# Patient Record
Sex: MALE | Race: WHITE | NOT HISPANIC OR LATINO | Employment: OTHER | URBAN - METROPOLITAN AREA
[De-identification: names, ages, dates, MRNs, and addresses within clinical notes are randomized per-mention and may not be internally consistent; named-entity substitution may affect disease eponyms.]

---

## 2021-04-27 ENCOUNTER — HOSPITAL ENCOUNTER (INPATIENT)
Facility: MEDICAL CENTER | Age: 71
LOS: 14 days | DRG: 824 | End: 2021-05-12
Attending: EMERGENCY MEDICINE | Admitting: INTERNAL MEDICINE
Payer: MEDICARE

## 2021-04-27 ENCOUNTER — HOSPITAL ENCOUNTER (OUTPATIENT)
Dept: RADIOLOGY | Facility: MEDICAL CENTER | Age: 71
End: 2021-04-27
Payer: MEDICARE

## 2021-04-27 DIAGNOSIS — R19.00 RETROPERITONEAL MASS: ICD-10-CM

## 2021-04-27 DIAGNOSIS — C83.38 DIFFUSE LARGE B-CELL LYMPHOMA OF LYMPH NODES OF MULTIPLE REGIONS (HCC): Primary | ICD-10-CM

## 2021-04-27 LAB
ALBUMIN SERPL BCP-MCNC: 3.5 G/DL (ref 3.2–4.9)
ALBUMIN/GLOB SERPL: 1.3 G/DL
ALP SERPL-CCNC: 76 U/L (ref 30–99)
ALT SERPL-CCNC: 9 U/L (ref 2–50)
ANION GAP SERPL CALC-SCNC: 10 MMOL/L (ref 7–16)
AST SERPL-CCNC: 16 U/L (ref 12–45)
BASOPHILS # BLD AUTO: 0.9 % (ref 0–1.8)
BASOPHILS # BLD: 0.06 K/UL (ref 0–0.12)
BILIRUB SERPL-MCNC: 0.3 MG/DL (ref 0.1–1.5)
BUN SERPL-MCNC: 27 MG/DL (ref 8–22)
CALCIUM SERPL-MCNC: 13.8 MG/DL (ref 8.5–10.5)
CHLORIDE SERPL-SCNC: 98 MMOL/L (ref 96–112)
CO2 SERPL-SCNC: 26 MMOL/L (ref 20–33)
CREAT SERPL-MCNC: 2.38 MG/DL (ref 0.5–1.4)
EOSINOPHIL # BLD AUTO: 0.1 K/UL (ref 0–0.51)
EOSINOPHIL NFR BLD: 1.5 % (ref 0–6.9)
ERYTHROCYTE [DISTWIDTH] IN BLOOD BY AUTOMATED COUNT: 42.5 FL (ref 35.9–50)
GLOBULIN SER CALC-MCNC: 2.8 G/DL (ref 1.9–3.5)
GLUCOSE SERPL-MCNC: 81 MG/DL (ref 65–99)
HCT VFR BLD AUTO: 30.6 % (ref 42–52)
HGB BLD-MCNC: 9.8 G/DL (ref 14–18)
IMM GRANULOCYTES # BLD AUTO: 0.02 K/UL (ref 0–0.11)
IMM GRANULOCYTES NFR BLD AUTO: 0.3 % (ref 0–0.9)
LIPASE SERPL-CCNC: 26 U/L (ref 11–82)
LYMPHOCYTES # BLD AUTO: 0.43 K/UL (ref 1–4.8)
LYMPHOCYTES NFR BLD: 6.5 % (ref 22–41)
MAGNESIUM SERPL-MCNC: 2.1 MG/DL (ref 1.5–2.5)
MCH RBC QN AUTO: 25.8 PG (ref 27–33)
MCHC RBC AUTO-ENTMCNC: 32 G/DL (ref 33.7–35.3)
MCV RBC AUTO: 80.5 FL (ref 81.4–97.8)
MONOCYTES # BLD AUTO: 0.84 K/UL (ref 0–0.85)
MONOCYTES NFR BLD AUTO: 12.8 % (ref 0–13.4)
NEUTROPHILS # BLD AUTO: 5.13 K/UL (ref 1.82–7.42)
NEUTROPHILS NFR BLD: 78 % (ref 44–72)
NRBC # BLD AUTO: 0 K/UL
NRBC BLD-RTO: 0 /100 WBC
PHOSPHATE SERPL-MCNC: 4.2 MG/DL (ref 2.5–4.5)
PLATELET # BLD AUTO: 301 K/UL (ref 164–446)
PMV BLD AUTO: 10 FL (ref 9–12.9)
POTASSIUM SERPL-SCNC: 4.1 MMOL/L (ref 3.6–5.5)
PROT SERPL-MCNC: 6.3 G/DL (ref 6–8.2)
RBC # BLD AUTO: 3.8 M/UL (ref 4.7–6.1)
SODIUM SERPL-SCNC: 134 MMOL/L (ref 135–145)
WBC # BLD AUTO: 6.6 K/UL (ref 4.8–10.8)

## 2021-04-27 PROCEDURE — U0003 INFECTIOUS AGENT DETECTION BY NUCLEIC ACID (DNA OR RNA); SEVERE ACUTE RESPIRATORY SYNDROME CORONAVIRUS 2 (SARS-COV-2) (CORONAVIRUS DISEASE [COVID-19]), AMPLIFIED PROBE TECHNIQUE, MAKING USE OF HIGH THROUGHPUT TECHNOLOGIES AS DESCRIBED BY CMS-2020-01-R: HCPCS

## 2021-04-27 PROCEDURE — G0378 HOSPITAL OBSERVATION PER HR: HCPCS

## 2021-04-27 PROCEDURE — 80053 COMPREHEN METABOLIC PANEL: CPT

## 2021-04-27 PROCEDURE — 83735 ASSAY OF MAGNESIUM: CPT

## 2021-04-27 PROCEDURE — 83690 ASSAY OF LIPASE: CPT

## 2021-04-27 PROCEDURE — 99285 EMERGENCY DEPT VISIT HI MDM: CPT

## 2021-04-27 PROCEDURE — 84100 ASSAY OF PHOSPHORUS: CPT

## 2021-04-27 PROCEDURE — U0005 INFEC AGEN DETEC AMPLI PROBE: HCPCS

## 2021-04-27 PROCEDURE — 99220 PR INITIAL OBSERVATION CARE,LEVL III: CPT | Performed by: STUDENT IN AN ORGANIZED HEALTH CARE EDUCATION/TRAINING PROGRAM

## 2021-04-27 PROCEDURE — 85025 COMPLETE CBC W/AUTO DIFF WBC: CPT

## 2021-04-27 RX ORDER — AMLODIPINE BESYLATE 5 MG/1
5 TABLET ORAL
Status: DISCONTINUED | OUTPATIENT
Start: 2021-04-27 | End: 2021-04-27

## 2021-04-27 RX ORDER — SODIUM CHLORIDE 9 MG/ML
INJECTION, SOLUTION INTRAVENOUS CONTINUOUS
Status: DISCONTINUED | OUTPATIENT
Start: 2021-04-27 | End: 2021-04-30

## 2021-04-27 RX ORDER — ALLOPURINOL 100 MG/1
100 TABLET ORAL 2 TIMES DAILY
COMMUNITY

## 2021-04-27 RX ORDER — LABETALOL HYDROCHLORIDE 5 MG/ML
10 INJECTION, SOLUTION INTRAVENOUS ONCE
Status: DISCONTINUED | OUTPATIENT
Start: 2021-04-27 | End: 2021-04-27

## 2021-04-27 RX ORDER — ACETAMINOPHEN 325 MG/1
650 TABLET ORAL EVERY 4 HOURS PRN
Status: DISCONTINUED | OUTPATIENT
Start: 2021-04-27 | End: 2021-05-12 | Stop reason: HOSPADM

## 2021-04-27 RX ORDER — LABETALOL HYDROCHLORIDE 5 MG/ML
5 INJECTION, SOLUTION INTRAVENOUS ONCE
Status: COMPLETED | OUTPATIENT
Start: 2021-04-27 | End: 2021-04-28

## 2021-04-28 ENCOUNTER — ANESTHESIA EVENT (OUTPATIENT)
Dept: SURGERY | Facility: MEDICAL CENTER | Age: 71
DRG: 824 | End: 2021-04-28
Payer: MEDICARE

## 2021-04-28 ENCOUNTER — ANESTHESIA (OUTPATIENT)
Dept: SURGERY | Facility: MEDICAL CENTER | Age: 71
DRG: 824 | End: 2021-04-28
Payer: MEDICARE

## 2021-04-28 ENCOUNTER — APPOINTMENT (OUTPATIENT)
Dept: RADIOLOGY | Facility: MEDICAL CENTER | Age: 71
DRG: 824 | End: 2021-04-28
Attending: UROLOGY
Payer: MEDICARE

## 2021-04-28 ENCOUNTER — APPOINTMENT (OUTPATIENT)
Dept: RADIOLOGY | Facility: MEDICAL CENTER | Age: 71
DRG: 824 | End: 2021-04-28
Attending: INTERNAL MEDICINE
Payer: MEDICARE

## 2021-04-28 PROBLEM — I10 HYPERTENSION: Status: ACTIVE | Noted: 2021-04-28

## 2021-04-28 PROBLEM — R59.1 LYMPHADENOPATHY: Status: ACTIVE | Noted: 2021-04-28

## 2021-04-28 PROBLEM — E83.52 HYPERCALCEMIA: Status: ACTIVE | Noted: 2021-04-28

## 2021-04-28 PROBLEM — J90 PLEURAL EFFUSION ON LEFT: Status: ACTIVE | Noted: 2021-04-28

## 2021-04-28 PROBLEM — N13.30 HYDRONEPHROSIS: Status: ACTIVE | Noted: 2021-04-28

## 2021-04-28 PROBLEM — N17.9 ACUTE KIDNEY INJURY SUPERIMPOSED ON CKD (HCC): Status: ACTIVE | Noted: 2021-04-28

## 2021-04-28 PROBLEM — N18.9 ACUTE KIDNEY INJURY SUPERIMPOSED ON CKD (HCC): Status: ACTIVE | Noted: 2021-04-28

## 2021-04-28 LAB
CALCIUM SERPL-MCNC: 13.3 MG/DL (ref 8.5–10.5)
EKG IMPRESSION: NORMAL
PHOSPHATE SERPL-MCNC: 4.3 MG/DL (ref 2.5–4.5)
PTH-INTACT SERPL-MCNC: 4.1 PG/ML (ref 14–72)
SARS-COV-2 RNA RESP QL NAA+PROBE: NOTDETECTED
SPECIMEN SOURCE: NORMAL

## 2021-04-28 PROCEDURE — 770006 HCHG ROOM/CARE - MED/SURG/GYN SEMI*

## 2021-04-28 PROCEDURE — 160048 HCHG OR STATISTICAL LEVEL 1-5: Performed by: UROLOGY

## 2021-04-28 PROCEDURE — 36415 COLL VENOUS BLD VENIPUNCTURE: CPT

## 2021-04-28 PROCEDURE — 160035 HCHG PACU - 1ST 60 MINS PHASE I: Performed by: UROLOGY

## 2021-04-28 PROCEDURE — 93010 ELECTROCARDIOGRAM REPORT: CPT | Performed by: INTERNAL MEDICINE

## 2021-04-28 PROCEDURE — 93005 ELECTROCARDIOGRAM TRACING: CPT | Performed by: UROLOGY

## 2021-04-28 PROCEDURE — 83970 ASSAY OF PARATHORMONE: CPT

## 2021-04-28 PROCEDURE — 99232 SBSQ HOSP IP/OBS MODERATE 35: CPT | Performed by: INTERNAL MEDICINE

## 2021-04-28 PROCEDURE — C2617 STENT, NON-COR, TEM W/O DEL: HCPCS | Performed by: UROLOGY

## 2021-04-28 PROCEDURE — A9270 NON-COVERED ITEM OR SERVICE: HCPCS | Performed by: STUDENT IN AN ORGANIZED HEALTH CARE EDUCATION/TRAINING PROGRAM

## 2021-04-28 PROCEDURE — 700101 HCHG RX REV CODE 250: Performed by: ANESTHESIOLOGY

## 2021-04-28 PROCEDURE — 160039 HCHG SURGERY MINUTES - EA ADDL 1 MIN LEVEL 3: Performed by: UROLOGY

## 2021-04-28 PROCEDURE — 700111 HCHG RX REV CODE 636 W/ 250 OVERRIDE (IP): Performed by: STUDENT IN AN ORGANIZED HEALTH CARE EDUCATION/TRAINING PROGRAM

## 2021-04-28 PROCEDURE — 96374 THER/PROPH/DIAG INJ IV PUSH: CPT

## 2021-04-28 PROCEDURE — 700117 HCHG RX CONTRAST REV CODE 255: Performed by: UROLOGY

## 2021-04-28 PROCEDURE — C1758 CATHETER, URETERAL: HCPCS | Performed by: UROLOGY

## 2021-04-28 PROCEDURE — 700105 HCHG RX REV CODE 258: Performed by: STUDENT IN AN ORGANIZED HEALTH CARE EDUCATION/TRAINING PROGRAM

## 2021-04-28 PROCEDURE — 700102 HCHG RX REV CODE 250 W/ 637 OVERRIDE(OP): Performed by: STUDENT IN AN ORGANIZED HEALTH CARE EDUCATION/TRAINING PROGRAM

## 2021-04-28 PROCEDURE — 84100 ASSAY OF PHOSPHORUS: CPT

## 2021-04-28 PROCEDURE — 160009 HCHG ANES TIME/MIN: Performed by: UROLOGY

## 2021-04-28 PROCEDURE — 74176 CT ABD & PELVIS W/O CONTRAST: CPT | Mod: MG

## 2021-04-28 PROCEDURE — 160002 HCHG RECOVERY MINUTES (STAT): Performed by: UROLOGY

## 2021-04-28 PROCEDURE — 500879 HCHG PACK, CYSTO: Performed by: UROLOGY

## 2021-04-28 PROCEDURE — 82306 VITAMIN D 25 HYDROXY: CPT

## 2021-04-28 PROCEDURE — 160036 HCHG PACU - EA ADDL 30 MINS PHASE I: Performed by: UROLOGY

## 2021-04-28 PROCEDURE — 700111 HCHG RX REV CODE 636 W/ 250 OVERRIDE (IP): Performed by: ANESTHESIOLOGY

## 2021-04-28 PROCEDURE — 82310 ASSAY OF CALCIUM: CPT

## 2021-04-28 PROCEDURE — 700105 HCHG RX REV CODE 258: Performed by: ANESTHESIOLOGY

## 2021-04-28 PROCEDURE — C1769 GUIDE WIRE: HCPCS | Performed by: UROLOGY

## 2021-04-28 PROCEDURE — 700101 HCHG RX REV CODE 250: Performed by: STUDENT IN AN ORGANIZED HEALTH CARE EDUCATION/TRAINING PROGRAM

## 2021-04-28 PROCEDURE — 96372 THER/PROPH/DIAG INJ SC/IM: CPT

## 2021-04-28 PROCEDURE — 501329 HCHG SET, CYSTO IRRIG Y TUR: Performed by: UROLOGY

## 2021-04-28 PROCEDURE — 160028 HCHG SURGERY MINUTES - 1ST 30 MINS LEVEL 3: Performed by: UROLOGY

## 2021-04-28 DEVICE — STENT UROLOGICAL POLARIS 6X26  ULTRA: Type: IMPLANTABLE DEVICE | Site: URETER | Status: FUNCTIONAL

## 2021-04-28 RX ORDER — OXYCODONE HCL 5 MG/5 ML
5 SOLUTION, ORAL ORAL
Status: DISCONTINUED | OUTPATIENT
Start: 2021-04-28 | End: 2021-04-28 | Stop reason: HOSPADM

## 2021-04-28 RX ORDER — ATENOLOL 50 MG/1
50 TABLET ORAL
Status: DISCONTINUED | OUTPATIENT
Start: 2021-04-28 | End: 2021-05-12 | Stop reason: HOSPADM

## 2021-04-28 RX ORDER — LIDOCAINE HYDROCHLORIDE 20 MG/ML
INJECTION, SOLUTION EPIDURAL; INFILTRATION; INTRACAUDAL; PERINEURAL PRN
Status: DISCONTINUED | OUTPATIENT
Start: 2021-04-28 | End: 2021-04-28 | Stop reason: SURG

## 2021-04-28 RX ORDER — CYCLOBENZAPRINE HCL 10 MG
10 TABLET ORAL 3 TIMES DAILY PRN
COMMUNITY

## 2021-04-28 RX ORDER — ATENOLOL 50 MG/1
50 TABLET ORAL DAILY
COMMUNITY

## 2021-04-28 RX ORDER — OXYCODONE HCL 5 MG/5 ML
10 SOLUTION, ORAL ORAL
Status: DISCONTINUED | OUTPATIENT
Start: 2021-04-28 | End: 2021-04-28 | Stop reason: HOSPADM

## 2021-04-28 RX ORDER — SODIUM CHLORIDE, SODIUM LACTATE, POTASSIUM CHLORIDE, CALCIUM CHLORIDE 600; 310; 30; 20 MG/100ML; MG/100ML; MG/100ML; MG/100ML
INJECTION, SOLUTION INTRAVENOUS
Status: DISCONTINUED | OUTPATIENT
Start: 2021-04-28 | End: 2021-04-28 | Stop reason: SURG

## 2021-04-28 RX ORDER — MIDAZOLAM HYDROCHLORIDE 1 MG/ML
INJECTION INTRAMUSCULAR; INTRAVENOUS PRN
Status: DISCONTINUED | OUTPATIENT
Start: 2021-04-28 | End: 2021-04-28 | Stop reason: SURG

## 2021-04-28 RX ORDER — CALCITONIN SALMON 200 [USP'U]/ML
4 INJECTION, SOLUTION INTRAMUSCULAR; SUBCUTANEOUS ONCE
Status: DISCONTINUED | OUTPATIENT
Start: 2021-04-28 | End: 2021-04-28

## 2021-04-28 RX ORDER — ONDANSETRON 2 MG/ML
4 INJECTION INTRAMUSCULAR; INTRAVENOUS
Status: DISCONTINUED | OUTPATIENT
Start: 2021-04-28 | End: 2021-04-28 | Stop reason: HOSPADM

## 2021-04-28 RX ORDER — HALOPERIDOL 5 MG/ML
1 INJECTION INTRAMUSCULAR
Status: DISCONTINUED | OUTPATIENT
Start: 2021-04-28 | End: 2021-04-28 | Stop reason: HOSPADM

## 2021-04-28 RX ORDER — ATENOLOL 50 MG/1
50 TABLET ORAL
Status: DISCONTINUED | OUTPATIENT
Start: 2021-04-28 | End: 2021-04-28

## 2021-04-28 RX ORDER — HYDROMORPHONE HYDROCHLORIDE 1 MG/ML
0.5 INJECTION, SOLUTION INTRAMUSCULAR; INTRAVENOUS; SUBCUTANEOUS EVERY 4 HOURS PRN
Status: DISCONTINUED | OUTPATIENT
Start: 2021-04-28 | End: 2021-05-01

## 2021-04-28 RX ORDER — ROCURONIUM BROMIDE 10 MG/ML
INJECTION, SOLUTION INTRAVENOUS PRN
Status: DISCONTINUED | OUTPATIENT
Start: 2021-04-28 | End: 2021-04-28 | Stop reason: SURG

## 2021-04-28 RX ORDER — LABETALOL HYDROCHLORIDE 5 MG/ML
5 INJECTION, SOLUTION INTRAVENOUS
Status: DISCONTINUED | OUTPATIENT
Start: 2021-04-28 | End: 2021-04-28 | Stop reason: HOSPADM

## 2021-04-28 RX ORDER — CEFAZOLIN SODIUM 1 G/3ML
INJECTION, POWDER, FOR SOLUTION INTRAMUSCULAR; INTRAVENOUS PRN
Status: DISCONTINUED | OUTPATIENT
Start: 2021-04-28 | End: 2021-04-28 | Stop reason: SURG

## 2021-04-28 RX ORDER — DIPHENHYDRAMINE HYDROCHLORIDE 50 MG/ML
12.5 INJECTION INTRAMUSCULAR; INTRAVENOUS
Status: DISCONTINUED | OUTPATIENT
Start: 2021-04-28 | End: 2021-04-28 | Stop reason: HOSPADM

## 2021-04-28 RX ORDER — SODIUM CHLORIDE, SODIUM LACTATE, POTASSIUM CHLORIDE, CALCIUM CHLORIDE 600; 310; 30; 20 MG/100ML; MG/100ML; MG/100ML; MG/100ML
INJECTION, SOLUTION INTRAVENOUS CONTINUOUS
Status: DISCONTINUED | OUTPATIENT
Start: 2021-04-28 | End: 2021-04-28 | Stop reason: HOSPADM

## 2021-04-28 RX ADMIN — ATENOLOL 50 MG: 50 TABLET ORAL at 09:25

## 2021-04-28 RX ADMIN — CEFAZOLIN 2 G: 330 INJECTION, POWDER, FOR SOLUTION INTRAMUSCULAR; INTRAVENOUS at 16:12

## 2021-04-28 RX ADMIN — ACETAMINOPHEN 650 MG: 325 TABLET, FILM COATED ORAL at 01:57

## 2021-04-28 RX ADMIN — SODIUM CHLORIDE: 9 INJECTION, SOLUTION INTRAVENOUS at 06:44

## 2021-04-28 RX ADMIN — MIDAZOLAM HYDROCHLORIDE 2 MG: 1 INJECTION, SOLUTION INTRAMUSCULAR; INTRAVENOUS at 16:18

## 2021-04-28 RX ADMIN — SUGAMMADEX 200 MG: 100 INJECTION, SOLUTION INTRAVENOUS at 16:42

## 2021-04-28 RX ADMIN — SODIUM CHLORIDE, POTASSIUM CHLORIDE, SODIUM LACTATE AND CALCIUM CHLORIDE: 600; 310; 30; 20 INJECTION, SOLUTION INTRAVENOUS at 16:12

## 2021-04-28 RX ADMIN — LABETALOL HYDROCHLORIDE 5 MG: 5 INJECTION, SOLUTION INTRAVENOUS at 00:13

## 2021-04-28 RX ADMIN — ROCURONIUM BROMIDE 40 MG: 10 INJECTION, SOLUTION INTRAVENOUS at 16:18

## 2021-04-28 RX ADMIN — ENOXAPARIN SODIUM 40 MG: 40 INJECTION SUBCUTANEOUS at 06:44

## 2021-04-28 RX ADMIN — FENTANYL CITRATE 100 MCG: 50 INJECTION, SOLUTION INTRAMUSCULAR; INTRAVENOUS at 16:18

## 2021-04-28 RX ADMIN — LIDOCAINE HYDROCHLORIDE 100 MG: 20 INJECTION, SOLUTION EPIDURAL; INFILTRATION; INTRACAUDAL at 16:18

## 2021-04-28 RX ADMIN — PROPOFOL 150 MG: 10 INJECTION, EMULSION INTRAVENOUS at 16:18

## 2021-04-28 RX ADMIN — SODIUM CHLORIDE: 9 INJECTION, SOLUTION INTRAVENOUS at 00:13

## 2021-04-28 ASSESSMENT — LIFESTYLE VARIABLES
HAVE YOU EVER FELT YOU SHOULD CUT DOWN ON YOUR DRINKING: YES
CONSUMPTION TOTAL: POSITIVE
HOW MANY TIMES IN THE PAST YEAR HAVE YOU HAD 5 OR MORE DRINKS IN A DAY: 12
TOTAL SCORE: 1
DOES PATIENT WANT TO STOP DRINKING: NO
EVER FELT BAD OR GUILTY ABOUT YOUR DRINKING: NO
ALCOHOL_USE: YES
ON A TYPICAL DAY WHEN YOU DRINK ALCOHOL HOW MANY DRINKS DO YOU HAVE: 1
TOTAL SCORE: 1
EVER HAD A DRINK FIRST THING IN THE MORNING TO STEADY YOUR NERVES TO GET RID OF A HANGOVER: NO
AVERAGE NUMBER OF DAYS PER WEEK YOU HAVE A DRINK CONTAINING ALCOHOL: 1
TOTAL SCORE: 1
HAVE PEOPLE ANNOYED YOU BY CRITICIZING YOUR DRINKING: NO

## 2021-04-28 ASSESSMENT — ENCOUNTER SYMPTOMS
BACK PAIN: 1
FEVER: 0
NAUSEA: 0
CONSTIPATION: 1
DEPRESSION: 0
ABDOMINAL PAIN: 0
HEMOPTYSIS: 0
CHILLS: 1
CHILLS: 0
VOMITING: 0
HEARTBURN: 0
SHORTNESS OF BREATH: 0
BRUISES/BLEEDS EASILY: 0
WEIGHT LOSS: 1
FEVER: 1
PALPITATIONS: 0
MYALGIAS: 0
BLURRED VISION: 0
COUGH: 0
DOUBLE VISION: 0
HEADACHES: 0
NECK PAIN: 0
DIZZINESS: 0

## 2021-04-28 ASSESSMENT — PATIENT HEALTH QUESTIONNAIRE - PHQ9
SUM OF ALL RESPONSES TO PHQ9 QUESTIONS 1 AND 2: 3
4. FEELING TIRED OR HAVING LITTLE ENERGY: NEARLY EVERY DAY
SUM OF ALL RESPONSES TO PHQ QUESTIONS 1-9: 16
7. TROUBLE CONCENTRATING ON THINGS, SUCH AS READING THE NEWSPAPER OR WATCHING TELEVISION: NEARLY EVERY DAY
8. MOVING OR SPEAKING SO SLOWLY THAT OTHER PEOPLE COULD HAVE NOTICED. OR THE OPPOSITE, BEING SO FIGETY OR RESTLESS THAT YOU HAVE BEEN MOVING AROUND A LOT MORE THAN USUAL: SEVERAL DAYS
9. THOUGHTS THAT YOU WOULD BE BETTER OFF DEAD, OR OF HURTING YOURSELF: NOT AT ALL
3. TROUBLE FALLING OR STAYING ASLEEP OR SLEEPING TOO MUCH: NOT AT ALL
2. FEELING DOWN, DEPRESSED, IRRITABLE, OR HOPELESS: NEARLY EVERY DAY
6. FEELING BAD ABOUT YOURSELF - OR THAT YOU ARE A FAILURE OR HAVE LET YOURSELF OR YOUR FAMILY DOWN: NEARLY EVERY DAY
5. POOR APPETITE OR OVEREATING: NEARLY EVERY DAY
1. LITTLE INTEREST OR PLEASURE IN DOING THINGS: NOT AT ALL

## 2021-04-28 ASSESSMENT — COGNITIVE AND FUNCTIONAL STATUS - GENERAL
WALKING IN HOSPITAL ROOM: A LITTLE
CLIMB 3 TO 5 STEPS WITH RAILING: A LITTLE
SUGGESTED CMS G CODE MODIFIER DAILY ACTIVITY: CH
MOBILITY SCORE: 22
SUGGESTED CMS G CODE MODIFIER MOBILITY: CJ
DAILY ACTIVITIY SCORE: 24

## 2021-04-28 ASSESSMENT — PAIN DESCRIPTION - PAIN TYPE
TYPE: CHRONIC PAIN

## 2021-04-28 ASSESSMENT — PAIN SCALES - GENERAL: PAIN_LEVEL: 0

## 2021-04-28 NOTE — PROGRESS NOTES
2 RN skin check complete.     Peripheral IV in L forearm. Dry skin to feet. No redness bruising scrapes or scabs noted. Skin assessed under folds.     Pt repositions self frequently and walks occasionally.

## 2021-04-28 NOTE — PROGRESS NOTES
Elvis from Lab called with critical result of Calcium 13.3 at 0602. Critical lab result read back to Elvis.   Critcal improved from previous value, MD already aware.

## 2021-04-28 NOTE — RESPIRATORY CARE
COPD EDUCATION by COPD CLINICAL EDUCATOR  4/28/2021 at 2:57 PM by Sarah Beth Moon, RRT     Patient interviewed by COPD education team. Patient refused COPD program at this time. He states he has been told he has COPD but declined further discussion. [facility].He is not on any medications at this time. He is a regular Marijuana smoker and declines cessation information.

## 2021-04-28 NOTE — CARE PLAN
Problem: Safety  Goal: Will remain free from falls    Outcome: PROGRESSING AS EXPECTED  Note: Universal  fall precautions in place, call light and belongings in reach, reinforced use of call light, treaded socks on, bed in low position and locked.       Problem: Pain Management  Goal: Pain level will decrease to patient's comfort goal    Outcome: PROGRESSING AS EXPECTED  Note: Provided medication per MAR, implemented non-pharm pain control, and educated patient on pain control plan and expectations.

## 2021-04-28 NOTE — ED NOTES
Med Rec partially completed: pharmacy to be called in AM for rec.    Preferred Pharmacy: [facility] [phone]      Allergies:  Not on File    No ORAL antibiotics in last 14 days

## 2021-04-28 NOTE — PROGRESS NOTES
Report received at start of shift.  Assessment complete.  A&O x 4. Patient calls appropriately.  Patient mobilizes independently.  Patient has 0/10 pain.   Denies N&V. Tolerating diet.  + void, + flatus, last BM 4/27.  Patient denies SOB.    Reviewed plan with of care with patient. Call light and personal belongings with in reach. Hourly rounding in place. All needs met at this time.

## 2021-04-28 NOTE — ASSESSMENT & PLAN NOTE
Likely secondary to malignancy will need tissue diagnosis. Had a calcium level of 14 and was given IVF and did recieve IV 4 mg zolendronic acid on 4/27/2021 at 1607.   PTH low, vit D wnl  Trend Ca level  IVF stopped  Resolved

## 2021-04-28 NOTE — CARE PLAN
Problem: Knowledge Deficit  Goal: Knowledge of disease process/condition, treatment plan, diagnostic tests, and medications will improve  Outcome: PROGRESSING AS EXPECTED  Note: Discussed POC with pt, addressed questions and concerns      Problem: Fluid Volume:  Goal: Will maintain balanced intake and output  Outcome: PROGRESSING AS EXPECTED  Note: Pt NPO at this time, IVF running

## 2021-04-28 NOTE — ED PROVIDER NOTES
ED Provider Note    CHIEF COMPLAINT  Chief Complaint   Patient presents with   • Weakness   • Back Pain       HPI  Khalif Kirkpatrick is a 70 y.o. male who presents to the emergency department as a transfer from [facility] for concern for new diagnosis of lymphoma and acute kidney injury.  Patient does have history of chronic kidney disease he states since November he has been having progressively worsening fatigue night sweats weight loss and just generally exhaustion.  He is also developed some back pain and states that in his mind he cannot justify it thinking that he has had a little bit of the symptoms on and off his whole life.  However he followed up with primary care got outpatient laboratory analysis was found an extremely elevated calcium and acute kidney injury on top of his chronic kidney disease.  He states his been urinating okay was treated with pain management the outside facility and states that his pain is improved and manageable at this time.  He understands he was transferred here for rule out cancer    LAbs from OSH    Ct abd pelvis w/o cont  Extensive retroperitoneal lymphadenopathy etiology is likely malignancy secondary to lymphoma or other infiltrative process not excluded.  Left hydronephrosis on the basis of adenopathy diverticula without evidence of diverticulitis bilateral pleural effusions nonobstructing stone right kidney    Renal ultrasound severe left-sided hydro-8 mm focus of increased echogenicity in the right kidney which may resume nonobstructing renal stone    Chest and pelvis x-ray small bilateral pleural effusions left greater than right nonspecific bowel gas pattern      Chemistry with a sodium 133 potassium 4.2 chloride 94 bicarb 30 BUN 33 with creatinine 2.8 which is below his baseline calcium level is 14 otherwise normal LFTs CRP is elevated 3.46 normal TSH normal coags  Urinalysis within normal limits panel mild proteinuria    Patient was given zoledronic acid 1 L of normal  "saline lorazepam and hydrocodone    REVIEW OF SYSTEMS  Positives as above. Pertinent negatives include chest pain shortness of breath dyspnea on exertion bloody stools bloody emesis nausea vomiting cough diarrhea dysuria hematuria flank pain rash  All other review of systems are negative    PAST MEDICAL HISTORY   has a past medical history of Chronic obstructive pulmonary disease (HCC) and Hypertension.    SOCIAL HISTORY  Social History     Tobacco Use   • Smoking status: Never Smoker   Substance and Sexual Activity   • Alcohol use: Not Currently   • Drug use: Yes     Types: Inhaled     Comment: marijuana   • Sexual activity: Not on file       SURGICAL HISTORY  patient denies any surgical history    CURRENT MEDICATIONS  Home Medications    **Home medications have not yet been reviewed for this encounter**         ALLERGIES  Not on File    PHYSICAL EXAM  VITAL SIGNS: BP (!) 199/95   Pulse 77   Temp 36.3 °C (97.3 °F) (Temporal)   Resp 16   Ht 1.753 m (5' 9\")   Wt 88 kg (194 lb)   SpO2 94%   BMI 28.65 kg/m²    Pulse ox interpretation: I interpret this pulse ox as normal.  Constitutional: Alert in no apparent distress.  HENT: Normocephalic atraumatic, MMM  Eyes: PER, Conjunctiva normal, Non-icteric.   Neck: Normal range of motion, No tenderness, Supple, No stridor.   Cardiovascular: Regular rate and rhythm, no murmurs.   Thorax & Lungs: Normal breath sounds, No respiratory distress, No wheezing, No chest tenderness.   Abdomen: Bowel sounds normal, Soft, No tenderness, No pulsatile masses. No peritoneal signs.  Skin: Warm, Dry, No erythema, No rash.   Back: No bony tenderness, No CVA tenderness.   Extremities/MSK: Intact equal distal pulses, No edema, No tenderness, No cyanosis, no major deformities noted  Neurologic: Alert and oriented x3, No focal deficits noted.       DIFFERENTIAL DIAGNOSIS AND WORK UP PLAN    This is a 70 y.o. male who presents with VERO at the outside facility and hypercalcemia and concern for " lymphadenopathy and possible lymphoma on his CT scan leading to left hydronephrosis which is likely the cause of his VERO    DIAGNOSTIC STUDIES / PROCEDURES    EKG  No results found for this or any previous visit.    LABS  Pertinent Lab Findings  CBC with a normal white blood cell count hemoglobin of 9.8 with a left shift, CMP consistent with BUN of 27 creatinine 2.38 baseline is apparently around 1.6 magnesium phosphorus pending lipase normal Covid was sent      RADIOLOGY  No orders to display     The radiologist's interpretation of all radiological studies have been reviewed by me.      COURSE & MEDICAL DECISION MAKING  Pertinent Labs & Imaging studies reviewed. (See chart for details)    9:43 PM  Spoke w Dr Ford who has accepted the patient for hospitalization and will consult hematology in the morning.    I verified that the patient was wearing a mask and I was wearing appropriate PPE every time I entered the room. The patient's mask was on the patient at all times during my encounter except for a brief view of the oropharynx.          FINAL IMPRESSION  1.  Severe lymphadenopathy  2.  Night sweats weight loss  3.  VERO  4.  Left-sided hydronephrosis  5.  Constipation        Electronically signed by: Elizabeth Paredes M.D., 4/27/2021 9:12 PM    This dictation has been created using voice recognition software and/or scribes. The accuracy of the dictation is limited by the abilities of the software and the expertise of the scribes. I expect there may be some errors of grammar and possibly content. I made every attempt to manually correct the errors within my dictation. However, errors related to voice recognition software and/or scribes may still exist and should be interpreted within the appropriate context.

## 2021-04-28 NOTE — H&P
Hospital Medicine History & Physical Note    Date of Service  4/27/2021    Primary Care Physician  No primary care provider on file.    Consultants  None at this time    Code Status  Full Code    Chief Complaint  Chief Complaint   Patient presents with   • Weakness   • Back Pain       History of Presenting Illness  70 y.o. male with past medical history of hypertension on atenolol, CKD who presented 4/27/2021 as a transfer from [facility] for abnormal labs by PMD. Patient sates that he has been having back pain, low grade fevers, night sweats, and weight loss that started in November 2020. He characterizes his low back pain as migratory and dull, 6/10 in intensity, not relieved with anything exacerbated with movement. His PMD is Dr. Singh. He had a calcium level of 14 and was given IVF and IV 4 mg zolendronic acid on 4/27/2021 at 1607. He currently is able to make urine and denies any painful urination or frequency.     Review of Systems  Review of Systems   Constitutional: Positive for chills, fever, malaise/fatigue and weight loss.   HENT: Negative for hearing loss and tinnitus.    Eyes: Negative for blurred vision and double vision.   Respiratory: Negative for cough and hemoptysis.    Cardiovascular: Negative for chest pain and palpitations.   Gastrointestinal: Positive for constipation. Negative for heartburn and nausea.   Genitourinary: Negative for dysuria and urgency.   Musculoskeletal: Positive for back pain. Negative for myalgias and neck pain.   Neurological: Negative for dizziness and headaches.   Endo/Heme/Allergies: Does not bruise/bleed easily.   Psychiatric/Behavioral: Negative for depression and suicidal ideas.       Past Medical History   has a past medical history of Chronic obstructive pulmonary disease (HCC) and Hypertension.    Surgical History  Reviewed and not pertinent.     Family History  Reviewed and not pertinent.     Social History   reports that he has never smoked. He does not have  any smokeless tobacco history on file. He reports previous alcohol use. He reports current drug use. Drug: Inhaled.    Allergies  NKDA    Medications  Prior to Admission Medications   Prescriptions Last Dose Informant Patient Reported? Taking?   ALLOPURINOL PO unknown at unknown  Yes Yes   Sig: Take 1 tablet by mouth every day. Pt was unable to recall dosage/time taken.   Multiple Vitamin (MULTIVITAMIN PO) unknown at unknown  Yes Yes   Sig: Take 1 tablet by mouth every day.   OMEPRAZOLE PO unknown at unknown  Yes Yes   Sig: Take 1 capsule by mouth every day. Pt was unable to recall dosage/time taken.      Facility-Administered Medications: None       Physical Exam  Temp:  [36.3 °C (97.3 °F)] 36.3 °C (97.3 °F)  Pulse:  [77] 77  Resp:  [16] 16  BP: (199)/(95) 199/95  SpO2:  [94 %] 94 %    Physical Exam  Vitals and nursing note reviewed.   Constitutional:       Appearance: Normal appearance.   HENT:      Head: Normocephalic and atraumatic.      Right Ear: Tympanic membrane normal.      Left Ear: Tympanic membrane normal.      Mouth/Throat:      Pharynx: Oropharynx is clear.   Eyes:      Extraocular Movements: Extraocular movements intact.      Pupils: Pupils are equal, round, and reactive to light.   Cardiovascular:      Rate and Rhythm: Normal rate and regular rhythm.   Pulmonary:      Effort: Pulmonary effort is normal. No respiratory distress.      Breath sounds: Normal breath sounds. No stridor. No wheezing or rhonchi.   Abdominal:      General: Abdomen is flat. Bowel sounds are normal. There is no distension.      Palpations: Abdomen is soft. There is no mass.      Tenderness: There is no abdominal tenderness.      Hernia: No hernia is present.   Musculoskeletal:         General: No swelling, tenderness, deformity or signs of injury. Normal range of motion.      Cervical back: Neck supple. No rigidity or tenderness.   Skin:     General: Skin is warm and dry.      Capillary Refill: Capillary refill takes less than  2 seconds.   Neurological:      General: No focal deficit present.      Mental Status: He is alert and oriented to person, place, and time.      Cranial Nerves: No cranial nerve deficit.      Sensory: No sensory deficit.      Motor: No weakness.      Coordination: Coordination normal.   Psychiatric:         Mood and Affect: Mood normal.         Behavior: Behavior normal.         Laboratory:  Recent Labs     04/27/21  2105   WBC 6.6   RBC 3.80*   HEMOGLOBIN 9.8*   HEMATOCRIT 30.6*   MCV 80.5*   MCH 25.8*   MCHC 32.0*   RDW 42.5   PLATELETCT 301   MPV 10.0           Imaging:  OUTSIDE IMAGES-DX CHEST   Final Result      OUTSIDE IMAGES-CT CHEST/ABDOMEN/PELVIS   Final Result        Outside images :   Ct abdomen/pelvis w/o cont  Extensive retroperitoneal lymphadenopathy etiology is likely malignancy secondary to lymphoma or other infiltrative process not excluded    Left hydronephrosis on the basis of adenopathy diverticula without evidence of diverticulitis bilateral pleural effusions nonobstructing stone right kidney     Renal ultrasound severe left-sided hydro-8 mm focus of increased echogenicity in the right kidney which may resume nonobstructing renal stone     Chest and pelvis x-ray small bilateral pleural effusions left greater than right nonspecific bowel gas pattern    I have personally reviewed the above imaging with ER physician, Dr. Paredes.     Assessment/Plan:  I anticipate this patient is appropriate for observation status at this time.    * Lymphadenopathy  Assessment & Plan  Likely lymphoma. Will need biopsy and tissue diagnosis. Hematology/oncology to be consulted in the morning.       Acute kidney injury superimposed on CKD (HCC)  Assessment & Plan  Probably secondary to left sided hydronephrosis causing post obstruction failure. He is able to make urine at this time. Denies any pain. Ua negative.  Monitor Cr  Avoid nephrotoxic agents        Hypercalcemia  Assessment & Plan  Likely secondary to malignancy  will need tissue diagnosis. Had a calcium level of 14 and was given IVF and did recieve IV 4 mg zolendronic acid on 4/27/2021 at 1607.   IV fluids  Check PTH, magnesium, phos, 25 hydroxy vitamin D  Discussed with pharmacy will obtain Ca level with am labs.   If no improvement will consider calcitonin.       Hypertension  Assessment & Plan  Continue with atenolol 50 mg daily     Pleural effusion on left  Assessment & Plan  Unclear suspect secondary to malignancy.     Hydronephrosis  Assessment & Plan  From lymphadenopathy causing obstruction.   Monitor Cr  Urology/IR in morning for possible nephrostomy tube placement     VTE: lovenox

## 2021-04-28 NOTE — ANESTHESIA PROCEDURE NOTES
Airway    Date/Time: 4/28/2021 4:18 PM  Performed by: Rush Downing M.D.  Authorized by: Rush Downing M.D.     Location:  OR  Urgency:  Elective  Indications for Airway Management:  Anesthesia      Spontaneous Ventilation: absent    Sedation Level:  Deep  Preoxygenated: Yes    Patient Position:  Sniffing  Final Airway Type:  Endotracheal airway  Final Endotracheal Airway:  ETT  Cuffed: Yes    Technique Used for Successful ETT Placement:  Direct laryngoscopy    Insertion Site:  Oral  Blade Type:  Edwards  Laryngoscope Blade/Videolaryngoscope Blade Size:  2  ETT Size (mm):  8.0  Measured from:  Teeth  ETT to Teeth (cm):  23  Placement Verified by: auscultation and capnometry    Cormack-Lehane Classification:  Grade I - full view of glottis  Number of Attempts at Approach:  1

## 2021-04-28 NOTE — ANESTHESIA TIME REPORT
Anesthesia Start and Stop Event Times     Date Time Event    4/28/2021 1612 Anesthesia Start     1654 Anesthesia Stop        Responsible Staff  04/28/21    Name Role Begin End    Rush Downing M.D. Anesth 1612 1653        Preop Diagnosis (Free Text):  Pre-op Diagnosis     hydronephrosis, acute kidney injury        Preop Diagnosis (Codes):    Post op Diagnosis  Hydronephrosis      Premium Reason  A. 3PM - 7AM    Comments:

## 2021-04-28 NOTE — ED TRIAGE NOTES
.  Chief Complaint   Patient presents with   • Weakness   • Back Pain      Pt BIB EMS from [facility] as inter facility transfer for new diagnosis of Lymphoma. Pt reports going to ED today for weakness and chronic back pain, Pt has Hx of COPD, gout and HTN.

## 2021-04-28 NOTE — PROGRESS NOTES
Assessment complete.  A&O x 4. Patient calls appropriately.  Patient ambulates with SBA assist.   Patient has 0/10 pain.   Denies N&V. Pt did have a couple bites of breakfast before NPO order.  + void, + flatus, PTA BM.  Patient denies SOB.  Spoke to IR, they won't be able to take him until tomorrow for lymph node biopsy     Review plan with of care with patient. Call light and personal belongings with in reach. Hourly rounding in place. All needs met at this time.

## 2021-04-28 NOTE — PROGRESS NOTES
Pt  admitted to room 435-1 from ED at 2341.  Pt reported no pain, N/V or numbness Oriented to room call light and smoking policy.  Reviewed plan of care (equipment, medications, activity, fall precautions, skin care, and pain) with patient .    At 0150 patient reported 3/10 pain to lower back. Reports that he has had recurring back pain for years and the pain is not new. Medication given per MAR.

## 2021-04-28 NOTE — ASSESSMENT & PLAN NOTE
Unclear suspect secondary to malignancy.   Small in size per CT L>R  Will monitor, no indication for thoracentesis at this time.

## 2021-04-28 NOTE — PROGRESS NOTES
[facility] Medicine Daily Progress Note    Date of Service  4/28/2021    Chief Complaint  70 y.o. male admitted 4/27/2021 with weakness and back pain     Hospital Course  70 y.o. male with past medical history of hypertension on atenolol, CKD who presented 4/27/2021 as a transfer from [facility] for abnormal labs by PMD. Patient sates that he has been having back pain, low grade fevers, night sweats, and weight loss that started in November 2020. He characterizes his low back pain as migratory and dull, 6/10 in intensity, not relieved with anything exacerbated with movement. His PMD is Dr. Singh. He had a calcium level of 14 and was given IVF and IV 4 mg zolendronic acid on 4/27/2021 at 1607. He currently is able to make urine and denies any painful urination or frequency.     Interval Problem Update  IR consulted for lymph biopsy. Urology consulted for hydronephrosis.  Repeat CT abdomen pelvis done without contrast due to patient's kidney function showed a large retroperitoneal mass surrounding the left kidney and obstructing the left ureter, mass also surrounds the aorta superior mesenteric artery and vein.  Patient reports that he mostly feels fatigued, denies any pain.  Does report 20 pounds of weight loss over the last 3 months.    Consultants/Specialty  Urology     Code Status  Full Code    Disposition  Pending     Review of Systems  Review of Systems   Constitutional: Positive for malaise/fatigue and weight loss. Negative for chills and fever.   Respiratory: Negative for shortness of breath.    Cardiovascular: Negative for chest pain.   Gastrointestinal: Negative for abdominal pain, nausea and vomiting.   Genitourinary: Negative for dysuria.   Musculoskeletal: Negative for myalgias.   Skin: Negative for rash.   Neurological: Negative for dizziness and headaches.   Psychiatric/Behavioral: Negative for depression.   All other systems reviewed and are negative.       Physical Exam  Temp:  [36.3 °C (97.3  °F)-37.2 °C (98.9 °F)] 36.8 °C (98.2 °F)  Pulse:  [71-83] 73  Resp:  [13-19] 19  BP: (133-199)/(69-95) 156/86  SpO2:  [90 %-97 %] 95 %    Physical Exam  Vitals and nursing note reviewed.   Constitutional:       General: He is not in acute distress.     Appearance: Normal appearance. He is ill-appearing.   HENT:      Head: Normocephalic and atraumatic.   Eyes:      Conjunctiva/sclera: Conjunctivae normal.   Cardiovascular:      Rate and Rhythm: Normal rate and regular rhythm.      Pulses: Normal pulses.   Pulmonary:      Effort: Pulmonary effort is normal. No respiratory distress.      Breath sounds: Normal breath sounds. No wheezing.   Abdominal:      General: Abdomen is flat. There is no distension.      Palpations: Abdomen is soft.      Tenderness: There is no abdominal tenderness.   Musculoskeletal:         General: No swelling. Normal range of motion.      Cervical back: Normal range of motion and neck supple.   Skin:     General: Skin is warm and dry.      Findings: No rash.   Neurological:      General: No focal deficit present.      Mental Status: He is oriented to person, place, and time and easily aroused. He is lethargic.      Cranial Nerves: No cranial nerve deficit.   Psychiatric:         Mood and Affect: Mood normal.         Behavior: Behavior normal.         Fluids    Intake/Output Summary (Last 24 hours) at 4/28/2021 1443  Last data filed at 4/28/2021 1200  Gross per 24 hour   Intake 1817.5 ml   Output --   Net 1817.5 ml       Laboratory  Recent Labs     04/27/21 2105   WBC 6.6   RBC 3.80*   HEMOGLOBIN 9.8*   HEMATOCRIT 30.6*   MCV 80.5*   MCH 25.8*   MCHC 32.0*   RDW 42.5   PLATELETCT 301   MPV 10.0     Recent Labs     04/27/21 2105 04/28/21  0513   SODIUM 134*  --    POTASSIUM 4.1  --    CHLORIDE 98  --    CO2 26  --    GLUCOSE 81  --    BUN 27*  --    CREATININE 2.38*  --    CALCIUM 13.8* 13.3*                   Imaging  CT-ABDOMEN-PELVIS W/O   Final Result      1.  Very large retroperitoneal  mass surrounding LEFT kidney and obstructing LEFT ureter.  Mass also surrounds the aorta, superior mesenteric artery and vein.   2.  Nonobstructing RIGHT kidney stone.   3.  Minimal ascites.   4.  Evaluation is limited due to lack of IV contrast.      OUTSIDE IMAGES-DX CHEST   Final Result      OUTSIDE IMAGES-CT CHEST/ABDOMEN/PELVIS   Final Result      IR-NEEDLE BX-LYMPH NODE    (Results Pending)        Assessment/Plan  * Lymphadenopathy  Assessment & Plan  Possibly lymphoma   IR consulted for biopsy, planned for tomorrow   Consult oncology once pathology results available     Acute kidney injury superimposed on CKD (HCC)  Assessment & Plan  Probably secondary to left sided hydronephrosis causing post obstruction failure. He is able to make urine at this time. Denies any pain. Ua negative.  Monitor Cr  Avoid nephrotoxic agents        Hypercalcemia  Assessment & Plan  Likely secondary to malignancy will need tissue diagnosis. Had a calcium level of 14 and was given IVF and did recieve IV 4 mg zolendronic acid on 4/27/2021 at 1607.   IV fluids  PTH low, vit D pending   Trend Ca level, iCa pending    If no improvement will consider calcitonin.       Hypertension  Assessment & Plan  Continue with atenolol 50 mg daily     Pleural effusion on left  Assessment & Plan  Unclear suspect secondary to malignancy.     Hydronephrosis  Assessment & Plan  From mass causing obstruction.   Monitor Cr  Urology consulted, appreciate recommendations        VTE prophylaxis: Lovenox

## 2021-04-28 NOTE — ASSESSMENT & PLAN NOTE
Probably secondary to left sided hydronephrosis causing post obstruction failure  S/p ureteral stent   Monitor Cr closely during chemotherapy   Maintain hydration

## 2021-04-28 NOTE — ANESTHESIA PREPROCEDURE EVALUATION
Relevant Problems   CARDIAC   (+) Hypertension         (+) Acute kidney injury superimposed on CKD (HCC)   (+) Hydronephrosis       Physical Exam    Airway   Mallampati: II  TM distance: >3 FB  Neck ROM: full       Cardiovascular - normal exam  Rhythm: regular  Rate: normal  (-) murmur     Dental - normal exam           Pulmonary - normal exam  Breath sounds clear to auscultation     Abdominal    Neurological - normal exam                 Anesthesia Plan    ASA 2       Plan - general       Airway plan will be ETT          Induction: intravenous    Postoperative Plan: Postoperative administration of opioids is intended.    Pertinent diagnostic labs and testing reviewed    Informed Consent:    Anesthetic plan and risks discussed with patient.    Use of blood products discussed with: patient whom consented to blood products.

## 2021-04-28 NOTE — ASSESSMENT & PLAN NOTE
Preliminary pathology results consistent with aggressive B-cell lymphoma.  Bone marrow biopsy results pending.

## 2021-04-28 NOTE — ANESTHESIA POSTPROCEDURE EVALUATION
Patient: Khalif Kirkpatrick    Procedure Summary     Date: 04/28/21 Room / Location: [place] [address] / SURGERY [facility]    Anesthesia Start: 1612 Anesthesia Stop: 1654    Procedure: CYSTOSCOPY, WITH URETERAL STENT INSERTION (Left Ureter) Diagnosis: (hydronephrosis, acute kidney injury)    Surgeons: Jorge Sotelo M.D. Responsible Provider: Rush Downing M.D.    Anesthesia Type: general ASA Status: 2          Final Anesthesia Type: general  Last vitals  BP   Blood Pressure : (!) 167/91    Temp   37 °C (98.6 °F)    Pulse   78   Resp   18    SpO2   94 %      Anesthesia Post Evaluation    Patient location during evaluation: PACU  Patient participation: complete - patient participated  Level of consciousness: awake and alert  Pain score: 0    Airway patency: patent  Anesthetic complications: no  Cardiovascular status: hemodynamically stable  Respiratory status: acceptable  Hydration status: euvolemic    PONV: none          There were no known complications for this encounter.     Nurse Pain Score: 0 (NPRS)

## 2021-04-28 NOTE — CONSULTS
UROLOGY Consult Note:    ORESTES Jackson  Date & Time note created:    4/28/2021   2:31 PM       Patient ID:   Name:             Khalif Kirkpatrick   YOB: 1950  Age:                 70 y.o.  male   MRN:               [id number]                                                             Reason for Consult:      VERO, hydronephrosis    History of Present Illness:    This is a 70 y.o. Male who has had low back pain, night sweats, and weight loss starting since November 2020.  Urology has been consulted due to left sided hydronephrosis, acute kidney injury.  During workup, a CT was obtained which showed a large retroperitoneal mass surrounding left kidney and obstructing left ureter, left hydronephrosis.  At time of consult, he is currently NPO. He reports some back pain but minimal.  States that he is able to void well.  Denies dysuria, hematuria, straining to urinate.    Review of Systems:      Constitutional: See HPI  Eyes: Denies changes in vision, no eye pain  Ears/Nose/Throat/Mouth: Denies nasal congestion or sore throat   Cardiovascular: no chest pain, no palpitations   Respiratory: no shortness of breath , Denies cough  Gastrointestinal/Hepatic: See HPI  Genitourinary: See HPI  Musculoskeletal/Rheum: See HPI  Skin: Denies rash  Neurological: Denies headache, confusion, memory loss or focal weakness/parasthesias  Psychiatric: denies mood disorder   Endocrine: Eri thyroid problems  Heme/Oncology/Lymph Nodes: Denies enlarged lymph nodes, denies brusing or known bleeding disorder  All other systems were reviewed and are negative (AMA/CMS criteria)                Past Medical History:   Past Medical History:   Diagnosis Date   • Chronic obstructive pulmonary disease (HCC)    • Hypertension      Active Hospital Problems    Diagnosis    • Hypercalcemia [E83.52]      Priority: High   • Acute kidney injury superimposed on CKD (HCC) [N17.9, N18.9]      Priority: High   • Lymphadenopathy [R59.1]       Priority: High   • Hydronephrosis [N13.30]    • Pleural effusion on left [J90]    • Hypertension [I10]        Past Surgical History:  History reviewed. No pertinent surgical history.    Hospital Medications:    Current Facility-Administered Medications:   •  HYDROmorphone (Dilaudid) injection 0.5 mg, 0.5 mg, Intravenous, Q4HRS PRN, Antonio Ford M.D.  •  atenolol (TENORMIN) tablet 50 mg, 50 mg, Oral, Q DAY, Antonio Ford M.D., 50 mg at 04/28/21 0925  •  NS infusion, , Intravenous, Continuous, Antonio Ford M.D., Last Rate: 150 mL/hr at 04/28/21 0644, New Bag at 04/28/21 0644  •  enoxaparin (LOVENOX) inj 40 mg, 40 mg, Subcutaneous, DAILY, Antonio Ford M.D., 40 mg at 04/28/21 0644  •  acetaminophen (Tylenol) tablet 650 mg, 650 mg, Oral, Q4HRS PRN, Antonio Ford M.D., 650 mg at 04/28/21 0157    Current Outpatient Medications:  Medications Prior to Admission   Medication Sig Dispense Refill Last Dose   • cyclobenzaprine (FLEXERIL) 10 mg Tab Take 10 mg by mouth 3 times a day as needed for Muscle Spasms.   UNK at UNK   • atenolol (TENORMIN) 50 MG Tab Take 50 mg by mouth every day.   UNK at UNK   • allopurinol (ZYLOPRIM) 100 MG Tab Take 100 mg by mouth 2 times a day.   unknown at unknown   • OMEPRAZOLE PO Take 1 capsule by mouth every day. Pt was unable to recall dosage/time taken.   unknown at unknown   • Multiple Vitamin (MULTIVITAMIN PO) Take 1 tablet by mouth every day.   unknown at unknown       Medication Allergy:  Allergies   Allergen Reactions   • Ibuprofen      Facial Swelling       Family History:  History reviewed. No pertinent family history.    Social History:  Social History     Socioeconomic History   • Marital status:      Spouse name: Not on file   • Number of children: Not on file   • Years of education: Not on file   • Highest education level: Not on file   Occupational History   • Not on file   Tobacco Use   • Smoking status: Never Smoker   Substance and Sexual Activity   • Alcohol  "use: Not Currently   • Drug use: Yes     Types: Inhaled     Comment: marijuana   • Sexual activity: Not on file   Other Topics Concern   • Not on file   Social History Narrative   • Not on file     Social Determinants of Health     Financial Resource Strain:    • Difficulty of Paying Living Expenses:    Food Insecurity:    • Worried About Running Out of Food in the Last Year:    • Ran Out of Food in the Last Year:    Transportation Needs:    • Lack of Transportation (Medical):    • Lack of Transportation (Non-Medical):    Physical Activity:    • Days of Exercise per Week:    • Minutes of Exercise per Session:    Stress:    • Feeling of Stress :    Social Connections:    • Frequency of Communication with Friends and Family:    • Frequency of Social Gatherings with Friends and Family:    • Attends [employer] Services:    • Active Member of Clubs or Organizations:    • Attends Club or Organization Meetings:    • Marital Status:    Intimate Partner Violence:    • Fear of Current or Ex-Partner:    • Emotionally Abused:    • Physically Abused:    • Sexually Abused:          Physical Exam:  Vitals/ General Appearance:   Weight/BMI: Body mass index is 28.65 kg/m².  /86   Pulse 73   Temp 36.8 °C (98.2 °F) (Temporal)   Resp 19   Ht 1.753 m (5' 9\")   Wt 88 kg (194 lb)   SpO2 95%   Vitals:    04/28/21 0340 04/28/21 0755 04/28/21 0925 04/28/21 1150   BP: 133/69 140/80 141/80 156/86   Pulse: 83 77  73   Resp: 16 18  19   Temp: 37.1 °C (98.7 °F) 37.2 °C (98.9 °F)  36.8 °C (98.2 °F)   TempSrc: Temporal Temporal  Temporal   SpO2: 93% 97%  95%   Weight:       Height:         Oxygen Therapy:  Pulse Oximetry: 95 %, O2 (LPM): 0, O2 Delivery Device: None - Room Air    Constitutional:    No acute distress  HENMT:  Normocephalic, Atraumatic.  Eyes:  EOMI,No discharge.  Neck:  Normal range of motion  Lungs:  Normal respiratory effort.   Abdomen: soft, No tenderness, No guarding, No rebound tenderness.  : No CVAT  Skin: Warm, " Dry  Neurologic: Alert & oriented x 3  Psychiatric: Affect normal, Judgment normal, Mood normal.      MDM (Data Review):     Records reviewed and summarized in current documentation    Lab Data Review:  Recent Results (from the past 24 hour(s))   CBC WITH DIFFERENTIAL    Collection Time: 04/27/21  9:05 PM   Result Value Ref Range    WBC 6.6 4.8 - 10.8 K/uL    RBC 3.80 (L) 4.70 - 6.10 M/uL    Hemoglobin 9.8 (L) 14.0 - 18.0 g/dL    Hematocrit 30.6 (L) 42.0 - 52.0 %    MCV 80.5 (L) 81.4 - 97.8 fL    MCH 25.8 (L) 27.0 - 33.0 pg    MCHC 32.0 (L) 33.7 - 35.3 g/dL    RDW 42.5 35.9 - 50.0 fL    Platelet Count 301 164 - 446 K/uL    MPV 10.0 9.0 - 12.9 fL    Neutrophils-Polys 78.00 (H) 44.00 - 72.00 %    Lymphocytes 6.50 (L) 22.00 - 41.00 %    Monocytes 12.80 0.00 - 13.40 %    Eosinophils 1.50 0.00 - 6.90 %    Basophils 0.90 0.00 - 1.80 %    Immature Granulocytes 0.30 0.00 - 0.90 %    Nucleated RBC 0.00 /100 WBC    Neutrophils (Absolute) 5.13 1.82 - 7.42 K/uL    Lymphs (Absolute) 0.43 (L) 1.00 - 4.80 K/uL    Monos (Absolute) 0.84 0.00 - 0.85 K/uL    Eos (Absolute) 0.10 0.00 - 0.51 K/uL    Baso (Absolute) 0.06 0.00 - 0.12 K/uL    Immature Granulocytes (abs) 0.02 0.00 - 0.11 K/uL    NRBC (Absolute) 0.00 K/uL   COMP METABOLIC PANEL    Collection Time: 04/27/21  9:05 PM   Result Value Ref Range    Sodium 134 (L) 135 - 145 mmol/L    Potassium 4.1 3.6 - 5.5 mmol/L    Chloride 98 96 - 112 mmol/L    Co2 26 20 - 33 mmol/L    Anion Gap 10.0 7.0 - 16.0    Glucose 81 65 - 99 mg/dL    Bun 27 (H) 8 - 22 mg/dL    Creatinine 2.38 (H) 0.50 - 1.40 mg/dL    Calcium 13.8 (HH) 8.5 - 10.5 mg/dL    AST(SGOT) 16 12 - 45 U/L    ALT(SGPT) 9 2 - 50 U/L    Alkaline Phosphatase 76 30 - 99 U/L    Total Bilirubin 0.3 0.1 - 1.5 mg/dL    Albumin 3.5 3.2 - 4.9 g/dL    Total Protein 6.3 6.0 - 8.2 g/dL    Globulin 2.8 1.9 - 3.5 g/dL    A-G Ratio 1.3 g/dL   MAGNESIUM    Collection Time: 04/27/21  9:05 PM   Result Value Ref Range    Magnesium 2.1 1.5 - 2.5 mg/dL    PHOSPHORUS    Collection Time: 04/27/21  9:05 PM   Result Value Ref Range    Phosphorus 4.2 2.5 - 4.5 mg/dL   LIPASE    Collection Time: 04/27/21  9:05 PM   Result Value Ref Range    Lipase 26 11 - 82 U/L   ESTIMATED GFR    Collection Time: 04/27/21  9:05 PM   Result Value Ref Range    GFR If  33 (A) >60 mL/min/1.73 m 2    GFR If Non  27 (A) >60 mL/min/1.73 m 2   SARS-CoV-2 PCR (24 hour In-House): Collect NP swab in VTM    Collection Time: 04/27/21 10:13 PM    Specimen: Nasopharyngeal; Respirate   Result Value Ref Range    SARS-CoV-2 Source NP Swab     SARS-CoV-2 by PCR NotDetected    CALCIUM (CA)    Collection Time: 04/28/21  5:13 AM   Result Value Ref Range    Calcium 13.3 (HH) 8.5 - 10.5 mg/dL   PTH INTACT (PTH ONLY)    Collection Time: 04/28/21  5:13 AM   Result Value Ref Range    Pth, Intact 4.1 (L) 14.0 - 72.0 pg/mL   PHOSPHORUS    Collection Time: 04/28/21  5:13 AM   Result Value Ref Range    Phosphorus 4.3 2.5 - 4.5 mg/dL       Imaging/Procedures Review:    Reviewed    MDM (Assessment and Plan):     Active Hospital Problems    Diagnosis    • Hypercalcemia [E83.52]      Priority: High   • Acute kidney injury superimposed on CKD (HCC) [N17.9, N18.9]      Priority: High   • Lymphadenopathy [R59.1]      Priority: High   • Hydronephrosis [N13.30]    • Pleural effusion on left [J90]    • Hypertension [I10]      70 y.o. Male with history of low back pain, night sweats, and weight loss starting since November 2020. Now with findings of a large retroperitoneal mass surrounding left kidney and obstructing left ureter, left hydronephrosis, as well as acute kidney injury.   Plan:  - monitor pain control  - monitor urinary output  - keep NPO.  Anticipate potential cystoscopy placement of left ureteral stent, and retrograde pyelogram.  - monitor renal function.  - urology will follow    Dr. Sotelo is aware of this consult and the direction of the plan of care.

## 2021-04-29 ENCOUNTER — APPOINTMENT (OUTPATIENT)
Dept: RADIOLOGY | Facility: MEDICAL CENTER | Age: 71
DRG: 824 | End: 2021-04-29
Attending: INTERNAL MEDICINE
Payer: MEDICARE

## 2021-04-29 PROBLEM — R19.00 RETROPERITONEAL MASS: Status: ACTIVE | Noted: 2021-04-28

## 2021-04-29 LAB
25(OH)D3 SERPL-MCNC: 48 NG/ML (ref 30–80)
ALBUMIN SERPL BCP-MCNC: 3.1 G/DL (ref 3.2–4.9)
BUN SERPL-MCNC: 29 MG/DL (ref 8–22)
CA-I SERPL-SCNC: 1.5 MMOL/L (ref 1.1–1.3)
CALCIUM SERPL-MCNC: 11.3 MG/DL (ref 8.5–10.5)
CHLORIDE SERPL-SCNC: 103 MMOL/L (ref 96–112)
CO2 SERPL-SCNC: 24 MMOL/L (ref 20–33)
CREAT SERPL-MCNC: 2.27 MG/DL (ref 0.5–1.4)
ERYTHROCYTE [DISTWIDTH] IN BLOOD BY AUTOMATED COUNT: 42.5 FL (ref 35.9–50)
GLUCOSE SERPL-MCNC: 69 MG/DL (ref 65–99)
HCT VFR BLD AUTO: 29.2 % (ref 42–52)
HGB BLD-MCNC: 9 G/DL (ref 14–18)
MCH RBC QN AUTO: 25.1 PG (ref 27–33)
MCHC RBC AUTO-ENTMCNC: 30.8 G/DL (ref 33.7–35.3)
MCV RBC AUTO: 81.3 FL (ref 81.4–97.8)
PATHOLOGY CONSULT NOTE: NORMAL
PHOSPHATE SERPL-MCNC: 3.2 MG/DL (ref 2.5–4.5)
PLATELET # BLD AUTO: 243 K/UL (ref 164–446)
PMV BLD AUTO: 10.1 FL (ref 9–12.9)
POTASSIUM SERPL-SCNC: 3.9 MMOL/L (ref 3.6–5.5)
RBC # BLD AUTO: 3.59 M/UL (ref 4.7–6.1)
SODIUM SERPL-SCNC: 138 MMOL/L (ref 135–145)
WBC # BLD AUTO: 5.7 K/UL (ref 4.8–10.8)

## 2021-04-29 PROCEDURE — 700111 HCHG RX REV CODE 636 W/ 250 OVERRIDE (IP)

## 2021-04-29 PROCEDURE — 07BD3ZX EXCISION OF AORTIC LYMPHATIC, PERCUTANEOUS APPROACH, DIAGNOSTIC: ICD-10-PCS | Performed by: RADIOLOGY

## 2021-04-29 PROCEDURE — 85027 COMPLETE CBC AUTOMATED: CPT

## 2021-04-29 PROCEDURE — 88342 IMHCHEM/IMCYTCHM 1ST ANTB: CPT

## 2021-04-29 PROCEDURE — 700102 HCHG RX REV CODE 250 W/ 637 OVERRIDE(OP): Performed by: INTERNAL MEDICINE

## 2021-04-29 PROCEDURE — 700111 HCHG RX REV CODE 636 W/ 250 OVERRIDE (IP): Performed by: STUDENT IN AN ORGANIZED HEALTH CARE EDUCATION/TRAINING PROGRAM

## 2021-04-29 PROCEDURE — 88360 TUMOR IMMUNOHISTOCHEM/MANUAL: CPT

## 2021-04-29 PROCEDURE — 88341 IMHCHEM/IMCYTCHM EA ADD ANTB: CPT | Mod: 91

## 2021-04-29 PROCEDURE — A9270 NON-COVERED ITEM OR SERVICE: HCPCS | Performed by: INTERNAL MEDICINE

## 2021-04-29 PROCEDURE — 36415 COLL VENOUS BLD VENIPUNCTURE: CPT

## 2021-04-29 PROCEDURE — A9270 NON-COVERED ITEM OR SERVICE: HCPCS | Performed by: STUDENT IN AN ORGANIZED HEALTH CARE EDUCATION/TRAINING PROGRAM

## 2021-04-29 PROCEDURE — 700105 HCHG RX REV CODE 258: Performed by: STUDENT IN AN ORGANIZED HEALTH CARE EDUCATION/TRAINING PROGRAM

## 2021-04-29 PROCEDURE — 80069 RENAL FUNCTION PANEL: CPT

## 2021-04-29 PROCEDURE — 99232 SBSQ HOSP IP/OBS MODERATE 35: CPT | Performed by: INTERNAL MEDICINE

## 2021-04-29 PROCEDURE — 700102 HCHG RX REV CODE 250 W/ 637 OVERRIDE(OP): Performed by: STUDENT IN AN ORGANIZED HEALTH CARE EDUCATION/TRAINING PROGRAM

## 2021-04-29 PROCEDURE — 88305 TISSUE EXAM BY PATHOLOGIST: CPT

## 2021-04-29 PROCEDURE — 770006 HCHG ROOM/CARE - MED/SURG/GYN SEMI*

## 2021-04-29 PROCEDURE — 82330 ASSAY OF CALCIUM: CPT

## 2021-04-29 PROCEDURE — 99153 MOD SED SAME PHYS/QHP EA: CPT

## 2021-04-29 RX ORDER — NALOXONE HYDROCHLORIDE 0.4 MG/ML
INJECTION, SOLUTION INTRAMUSCULAR; INTRAVENOUS; SUBCUTANEOUS
Status: COMPLETED
Start: 2021-04-29 | End: 2021-04-29

## 2021-04-29 RX ORDER — ONDANSETRON 2 MG/ML
4 INJECTION INTRAMUSCULAR; INTRAVENOUS PRN
Status: ACTIVE | OUTPATIENT
Start: 2021-04-29 | End: 2021-04-29

## 2021-04-29 RX ORDER — OMEPRAZOLE 20 MG/1
20 CAPSULE, DELAYED RELEASE ORAL DAILY
Status: DISCONTINUED | OUTPATIENT
Start: 2021-04-29 | End: 2021-05-08

## 2021-04-29 RX ORDER — SODIUM CHLORIDE 9 MG/ML
500 INJECTION, SOLUTION INTRAVENOUS
Status: ACTIVE | OUTPATIENT
Start: 2021-04-29 | End: 2021-04-29

## 2021-04-29 RX ORDER — FLUMAZENIL 0.1 MG/ML
INJECTION INTRAVENOUS
Status: COMPLETED
Start: 2021-04-29 | End: 2021-04-29

## 2021-04-29 RX ORDER — LORAZEPAM 1 MG/1
0.5 TABLET ORAL EVERY 4 HOURS PRN
Status: DISCONTINUED | OUTPATIENT
Start: 2021-04-29 | End: 2021-05-02

## 2021-04-29 RX ORDER — MIDAZOLAM HYDROCHLORIDE 1 MG/ML
.5-2 INJECTION INTRAMUSCULAR; INTRAVENOUS PRN
Status: ACTIVE | OUTPATIENT
Start: 2021-04-29 | End: 2021-04-29

## 2021-04-29 RX ORDER — MIDAZOLAM HYDROCHLORIDE 1 MG/ML
INJECTION INTRAMUSCULAR; INTRAVENOUS
Status: COMPLETED
Start: 2021-04-29 | End: 2021-04-29

## 2021-04-29 RX ADMIN — SODIUM CHLORIDE: 9 INJECTION, SOLUTION INTRAVENOUS at 23:36

## 2021-04-29 RX ADMIN — MIDAZOLAM HYDROCHLORIDE 1 MG: 1 INJECTION INTRAMUSCULAR; INTRAVENOUS at 10:10

## 2021-04-29 RX ADMIN — HYDROMORPHONE HYDROCHLORIDE 0.5 MG: 1 INJECTION, SOLUTION INTRAMUSCULAR; INTRAVENOUS; SUBCUTANEOUS at 20:48

## 2021-04-29 RX ADMIN — FENTANYL CITRATE 50 MCG: 50 INJECTION, SOLUTION INTRAMUSCULAR; INTRAVENOUS at 10:10

## 2021-04-29 RX ADMIN — OMEPRAZOLE 20 MG: 20 CAPSULE, DELAYED RELEASE ORAL at 17:26

## 2021-04-29 RX ADMIN — MIDAZOLAM HYDROCHLORIDE 1 MG: 1 INJECTION, SOLUTION INTRAMUSCULAR; INTRAVENOUS at 10:10

## 2021-04-29 RX ADMIN — ATENOLOL 50 MG: 50 TABLET ORAL at 11:57

## 2021-04-29 ASSESSMENT — ENCOUNTER SYMPTOMS
FEVER: 0
CHILLS: 0
NAUSEA: 0
DEPRESSION: 0
SHORTNESS OF BREATH: 0
VOMITING: 0
MYALGIAS: 0
WEIGHT LOSS: 1
HEADACHES: 0
DIZZINESS: 0
ABDOMINAL PAIN: 0

## 2021-04-29 ASSESSMENT — PAIN DESCRIPTION - PAIN TYPE
TYPE: ACUTE PAIN
TYPE: CHRONIC PAIN
TYPE: ACUTE PAIN
TYPE: ACUTE PAIN

## 2021-04-29 NOTE — OR SURGEON
Immediate Post OP Note    PreOp Diagnosis: left hydronephrosis      PostOp Diagnosis: same      Procedure(s):  CYSTOSCOPY, WITH URETERAL STENT INSERTION - Wound Class: Clean Contaminated    Surgeon(s):  Jorge Sotelo M.D.    Anesthesiologist/Type of Anesthesia:  Anesthesiologist: Rush Downing M.D./General    Surgical Staff:  Circulator: Jose G Hoff R.N.  Scrub Person: Jose Blake R.N.    Specimens removed if any:  * No specimens in log *    Estimated Blood Loss: min    Findings: lateral deviation ureter    Complications: none        4/28/2021 5:12 PM Jorge Sotelo M.D.

## 2021-04-29 NOTE — OR SURGEON
Immediate Post- Operative Note    PostOp Diagnosis: RETROPERITONEAL ADENOPATHY      Procedure(s): CT GUIDED RETROPERITONEAL BIOPSY    18G CORES X 6. FORMALIN X 5. RPMI X 1.       Estimated Blood Loss: <1 CC        Complications: NONE            4/29/2021     10:19 AM     Eliot Paul M.D.

## 2021-04-29 NOTE — CARE PLAN
Problem: Communication  Goal: The ability to communicate needs accurately and effectively will improve  Outcome: PROGRESSING AS EXPECTED     Problem: Venous Thromboembolism (VTW)/Deep Vein Thrombosis (DVT) Prevention:  Goal: Patient will participate in Venous Thrombosis (VTE)/Deep Vein Thrombosis (DVT)Prevention Measures  Outcome: PROGRESSING AS EXPECTED     Problem: Knowledge Deficit  Goal: Knowledge of disease process/condition, treatment plan, diagnostic tests, and medications will improve  Outcome: PROGRESSING AS EXPECTED  Goal: Knowledge of the prescribed therapeutic regimen will improve  Outcome: PROGRESSING AS EXPECTED     Problem: Fluid Volume:  Goal: Will maintain balanced intake and output  Outcome: PROGRESSING AS EXPECTED

## 2021-04-29 NOTE — PROGRESS NOTES
Pt arrived to unit with RN. Lap site to R back with gauze and tegaderm. Bedrest for 2 hours. Pt denies pain at this time. Resting comfortably in bed

## 2021-04-29 NOTE — PROGRESS NOTES
IR RN note    Patient underwent a Retroperitoneal lymph node biopsy by Dr. Paul.  Procedure site was verified by MD using CT imaging guidance.  HEMAL Encinas assisted. Patient was placed in a prone position.  Vitals were taken every 5 minutes and remained stable during procedure (see doc flow sheet for results).  CO2 waveform capnography was monitored throughout procedure, see chart.   A gauze and tegaderm dressing was placed over surgical site. Report called to Chani PRESCOTT. Pt transported by gurbrea with RN to room , with monitor . Core   Biopsy Labs X 5 cores in formalin and x1 core in RPMI hand delivered to Hemal Soriano tech

## 2021-04-29 NOTE — OP REPORT
DATE OF SERVICE:  04/28/2021     PREOPERATIVE DIAGNOSES:  1.  Left hydronephrosis.  2.  Retroperitoneal adenopathy, massive.     POSTOPERATIVE DIAGNOSES:  1.  Left hydronephrosis.  2.  Retroperitoneal adenopathy, massive.     PROCEDURES:  Cystoscopy, left retrograde pyelogram, left ureteral stent   placement.     SURGEON:  Jorge Sotelo MD     ASSISTANT:  None.     ANESTHESIOLOGIST:  Rush Downing MD     TYPE OF ANESTHESIA:  General.     INDICATIONS:  This 70-year-old male was transferred with renal insufficiency   and a CT scan demonstrating left hydronephrosis and a large retroperitoneal   mass.  Suspicion is that this likely represents a new lymphoma.  He is taken   for ureteral stent placement.     FINDINGS:  There was lateral deviation of the proximal third of the ureter   with marked hydronephrosis proximally in the kidney.  There was a   circumferential encasement of the ureter proximally.  Stent was placed without   difficulty.     PROCEDURE:  The patient was identified in the holding area.  He was taken to   the operative suite.  General anesthesia was administered by Dr. Downing.    Cefazolin was given intravenously.  He was then sterilely prepped and draped.    Timeout was called.  Correct patient and site of surgery was confirmed.     A 22-Polish cystourethroscope was advanced through the urethral meatus.  He   did have a bulbar urethral stricture, which was dilated.  This was dilated   with the cystoscope.  I then passed the scope through the prostatic urethra.    Prostate was enlarged.  The lumen of the bladder showed no foreign bodies,   tumors or stones.     The left ureteral orifice was cannulated with an open-ended catheter.    Retrograde injection of contrast outlined normal distal ureter, but lateral   deviation of the mid and proximal ureter, which appeared to be encased.  The   kidney itself was dilated.  Sensor wire was advanced through the open-ended   catheter and into the kidney.  Over  this, a 6-Singaporean x 26 cm double-J stent   was passed.  The proximal coil formed in the lower pole dilated rené.  Distal   coil formed nicely within the bladder.  The bladder was drained.  The   procedure was terminated.  The patient was sent to recovery room in stable   condition.        ______________________________  MD DAVIE Jones/DINORAH    DD:  04/28/2021 17:16  DT:  04/28/2021 18:47    Job#:  [id number]

## 2021-04-29 NOTE — PROGRESS NOTES
[facility] Medicine Daily Progress Note    Date of Service  4/29/2021    Chief Complaint  70 y.o. male admitted 4/27/2021 with weakness and back pain     Hospital Course  70 y.o. male with past medical history of hypertension on atenolol, CKD who presented 4/27/2021 as a transfer from [facility] for abnormal labs by PMD. Patient sates that he has been having back pain, low grade fevers, night sweats, and weight loss that started in November 2020. He characterizes his low back pain as migratory and dull, 6/10 in intensity, not relieved with anything exacerbated with movement. His PMD is Dr. Singh. He had a calcium level of 14 and was given IVF and IV 4 mg zolendronic acid on admission. Repeat CT abdomen pelvis done without contrast due to patient's kidney function showed a large retroperitoneal mass surrounding the left kidney and obstructing the left ureter, mass also surrounds the aorta superior mesenteric artery and vein. Urology consulted for hydronephrosis.      Interval Problem Update  S/p cystoscopy with ureteral stent insertion. IR biopsy done this morning, pending pathology results. Calcium improved down to 11.3, iCa 1.5. Patient reports he is tired but otherwise has no complaints. Patient's family at bedside, updated on results and plan of care, all questions answered.     Consultants/Specialty  Urology   IR    Code Status  Full Code    Disposition  Pending     Review of Systems  Review of Systems   Constitutional: Positive for malaise/fatigue and weight loss. Negative for chills and fever.   Respiratory: Negative for shortness of breath.    Cardiovascular: Negative for chest pain.   Gastrointestinal: Negative for abdominal pain, nausea and vomiting.   Genitourinary: Negative for dysuria.   Musculoskeletal: Negative for myalgias.   Skin: Negative for rash.   Neurological: Negative for dizziness and headaches.   Psychiatric/Behavioral: Negative for depression.   All other systems reviewed and are  negative.       Physical Exam  Temp:  [36.1 °C (97 °F)-37.5 °C (99.5 °F)] 37.1 °C (98.8 °F)  Pulse:  [72-88] 88  Resp:  [16-24] 18  BP: (121-177)/(50-95) 121/50  SpO2:  [92 %-100 %] 98 %    Physical Exam  Vitals and nursing note reviewed.   Constitutional:       General: He is not in acute distress.     Appearance: Normal appearance. He is ill-appearing.   HENT:      Head: Normocephalic and atraumatic.   Eyes:      Conjunctiva/sclera: Conjunctivae normal.   Cardiovascular:      Rate and Rhythm: Normal rate and regular rhythm.      Pulses: Normal pulses.   Pulmonary:      Effort: Pulmonary effort is normal. No respiratory distress.      Breath sounds: Normal breath sounds. No wheezing.   Abdominal:      General: Abdomen is flat. There is no distension.      Palpations: Abdomen is soft.      Tenderness: There is no abdominal tenderness.   Musculoskeletal:         General: No swelling. Normal range of motion.      Cervical back: Normal range of motion and neck supple.   Skin:     General: Skin is warm and dry.      Findings: No rash.   Neurological:      General: No focal deficit present.      Mental Status: He is oriented to person, place, and time and easily aroused. He is lethargic.      Cranial Nerves: No cranial nerve deficit.   Psychiatric:         Mood and Affect: Mood normal.         Behavior: Behavior normal.         Fluids    Intake/Output Summary (Last 24 hours) at 4/29/2021 1345  Last data filed at 4/29/2021 1055  Gross per 24 hour   Intake 1002.5 ml   Output 275 ml   Net 727.5 ml       Laboratory  Recent Labs     04/27/21 2105 04/29/21 0437   WBC 6.6 5.7   RBC 3.80* 3.59*   HEMOGLOBIN 9.8* 9.0*   HEMATOCRIT 30.6* 29.2*   MCV 80.5* 81.3*   MCH 25.8* 25.1*   MCHC 32.0* 30.8*   RDW 42.5 42.5   PLATELETCT 301 243   MPV 10.0 10.1     Recent Labs     04/27/21 2105 04/28/21  0513 04/29/21  0437   SODIUM 134*  --  138   POTASSIUM 4.1  --  3.9   CHLORIDE 98  --  103   CO2 26  --  24   GLUCOSE 81  --  69   BUN  27*  --  29*   CREATININE 2.38*  --  2.27*   CALCIUM 13.8* 13.3* 11.3*                   Imaging  CT-NEEDLE BX-ABD-RETROPERITONL   Final Result      1.  CT GUIDED RIGHT RETROPERITONEAL BIOPSY. MASSIVE RIGHT-SIDED PARA-AORTIC ADENOPATHY WAS TARGETED.      DX-CYSTO FLUORO > 1 HOUR   Final Result      Cysto fluoroscopy utilized for 36 seconds         INTERPRETING LOCATION: [address], [place], [address]      CT-ABDOMEN-PELVIS W/O   Final Result      1.  Very large retroperitoneal mass surrounding LEFT kidney and obstructing LEFT ureter.  Mass also surrounds the aorta, superior mesenteric artery and vein.   2.  Nonobstructing RIGHT kidney stone.   3.  Minimal ascites.   4.  Evaluation is limited due to lack of IV contrast.      OUTSIDE IMAGES-DX CHEST   Final Result      OUTSIDE IMAGES-CT CHEST/ABDOMEN/PELVIS   Final Result           Assessment/Plan  * Retroperitoneal mass  Assessment & Plan  Possibly lymphoma   IR consulted, s/p biopsy   Consult oncology once pathology results available     Acute kidney injury superimposed on CKD (HCC)  Assessment & Plan  Probably secondary to left sided hydronephrosis causing post obstruction failure  Monitor Cr  Avoid nephrotoxic agents   Continue IVF    Hypercalcemia  Assessment & Plan  Likely secondary to malignancy will need tissue diagnosis. Had a calcium level of 14 and was given IVF and did recieve IV 4 mg zolendronic acid on 4/27/2021 at 1607.   IV fluids  PTH low, vit D pending   Trend Ca level    Improving       Hypertension  Assessment & Plan  Continue with atenolol 50 mg daily     Pleural effusion on left  Assessment & Plan  Unclear suspect secondary to malignancy.   Small in size per CT L>R  Will monitor, no indication for thoracentesis at this time.    Hydronephrosis  Assessment & Plan  From mass causing obstruction.   Monitor Cr  Urology consulted, s/p cystoscopy and ureteral stent placement 4/28        VTE prophylaxis: Lovenox

## 2021-04-29 NOTE — PROGRESS NOTES
Assessment complete.  A&O x 4. Patient calls appropriately.  Patient ambulates with SBA assist.   Patient has 2/10 pain. Pain managed with prescribed medications.  Denies N&V. NPO at this time for IR procedure.  + void, + flatus, PTA BM.  Patient denies SOB.  SCD's on.  Review plan with of care with patient. Call light and personal belongings with in reach. Hourly rounding in place. All needs met at this time.

## 2021-04-29 NOTE — OR NURSING
Respirations easy in PACU.  No bleeding from penis.  Patient is oriented times 4.  He denies pain and nausea.

## 2021-04-29 NOTE — PROGRESS NOTES
Pt is A&O 4  Pain controlled at this time on current regimen.  Refusing at this time to take PRN medication, encouraged increased movement and ambulation, states largest pain complaint is chronic back pain   - nausea  Tolerating a regular diet, currently NPO for procedure later this day  + Voids, frequent, large.  Pt refusing to use urinal or hat for measurement, slight pink in color  + flatus  - BM  Up SBA  SCD's refused  Family updated on POC  Bed alarm n/a, pt low fall risk per astrid gutiérrez  Reviewed plan of care with patient, bed in lowest position and locked, pt resting comfortably now, call light within reach, all needs met at this time. Interventions will be executed per plan of care

## 2021-04-30 LAB
ALBUMIN SERPL BCP-MCNC: 3 G/DL (ref 3.2–4.9)
BUN SERPL-MCNC: 28 MG/DL (ref 8–22)
CALCIUM SERPL-MCNC: 10.1 MG/DL (ref 8.5–10.5)
CHLORIDE SERPL-SCNC: 104 MMOL/L (ref 96–112)
CO2 SERPL-SCNC: 22 MMOL/L (ref 20–33)
CREAT SERPL-MCNC: 2.4 MG/DL (ref 0.5–1.4)
ERYTHROCYTE [DISTWIDTH] IN BLOOD BY AUTOMATED COUNT: 44.2 FL (ref 35.9–50)
GLUCOSE SERPL-MCNC: 73 MG/DL (ref 65–99)
HCT VFR BLD AUTO: 27.9 % (ref 42–52)
HGB BLD-MCNC: 8.8 G/DL (ref 14–18)
MCH RBC QN AUTO: 26 PG (ref 27–33)
MCHC RBC AUTO-ENTMCNC: 31.5 G/DL (ref 33.7–35.3)
MCV RBC AUTO: 82.5 FL (ref 81.4–97.8)
PHOSPHATE SERPL-MCNC: 2.6 MG/DL (ref 2.5–4.5)
PLATELET # BLD AUTO: 212 K/UL (ref 164–446)
PMV BLD AUTO: 10.2 FL (ref 9–12.9)
POTASSIUM SERPL-SCNC: 3.6 MMOL/L (ref 3.6–5.5)
RBC # BLD AUTO: 3.38 M/UL (ref 4.7–6.1)
SODIUM SERPL-SCNC: 135 MMOL/L (ref 135–145)
WBC # BLD AUTO: 5.5 K/UL (ref 4.8–10.8)

## 2021-04-30 PROCEDURE — BT1F1ZZ FLUOROSCOPY OF LEFT KIDNEY, URETER AND BLADDER USING LOW OSMOLAR CONTRAST: ICD-10-PCS | Performed by: UROLOGY

## 2021-04-30 PROCEDURE — 99233 SBSQ HOSP IP/OBS HIGH 50: CPT | Performed by: INTERNAL MEDICINE

## 2021-04-30 PROCEDURE — 36415 COLL VENOUS BLD VENIPUNCTURE: CPT

## 2021-04-30 PROCEDURE — 700111 HCHG RX REV CODE 636 W/ 250 OVERRIDE (IP): Performed by: STUDENT IN AN ORGANIZED HEALTH CARE EDUCATION/TRAINING PROGRAM

## 2021-04-30 PROCEDURE — 0T778DZ DILATION OF LEFT URETER WITH INTRALUMINAL DEVICE, VIA NATURAL OR ARTIFICIAL OPENING ENDOSCOPIC: ICD-10-PCS | Performed by: UROLOGY

## 2021-04-30 PROCEDURE — 700102 HCHG RX REV CODE 250 W/ 637 OVERRIDE(OP): Performed by: STUDENT IN AN ORGANIZED HEALTH CARE EDUCATION/TRAINING PROGRAM

## 2021-04-30 PROCEDURE — A9270 NON-COVERED ITEM OR SERVICE: HCPCS | Performed by: INTERNAL MEDICINE

## 2021-04-30 PROCEDURE — 700102 HCHG RX REV CODE 250 W/ 637 OVERRIDE(OP): Performed by: INTERNAL MEDICINE

## 2021-04-30 PROCEDURE — 80069 RENAL FUNCTION PANEL: CPT

## 2021-04-30 PROCEDURE — 85027 COMPLETE CBC AUTOMATED: CPT

## 2021-04-30 PROCEDURE — 770006 HCHG ROOM/CARE - MED/SURG/GYN SEMI*

## 2021-04-30 PROCEDURE — A9270 NON-COVERED ITEM OR SERVICE: HCPCS | Performed by: STUDENT IN AN ORGANIZED HEALTH CARE EDUCATION/TRAINING PROGRAM

## 2021-04-30 RX ADMIN — ATENOLOL 50 MG: 50 TABLET ORAL at 09:16

## 2021-04-30 RX ADMIN — ACETAMINOPHEN 650 MG: 325 TABLET, FILM COATED ORAL at 19:55

## 2021-04-30 RX ADMIN — LORAZEPAM 0.5 MG: 1 TABLET ORAL at 03:06

## 2021-04-30 RX ADMIN — HYDROMORPHONE HYDROCHLORIDE 0.5 MG: 1 INJECTION, SOLUTION INTRAMUSCULAR; INTRAVENOUS; SUBCUTANEOUS at 16:45

## 2021-04-30 RX ADMIN — ACETAMINOPHEN 650 MG: 325 TABLET, FILM COATED ORAL at 09:16

## 2021-04-30 RX ADMIN — ENOXAPARIN SODIUM 40 MG: 40 INJECTION SUBCUTANEOUS at 05:07

## 2021-04-30 RX ADMIN — LORAZEPAM 0.5 MG: 1 TABLET ORAL at 19:55

## 2021-04-30 ASSESSMENT — ENCOUNTER SYMPTOMS
NAUSEA: 0
VOMITING: 0
ABDOMINAL PAIN: 0
HEADACHES: 0
DIZZINESS: 0
CHILLS: 0
WEIGHT LOSS: 1
DEPRESSION: 0
SHORTNESS OF BREATH: 0
FEVER: 0
MYALGIAS: 0

## 2021-04-30 ASSESSMENT — PAIN DESCRIPTION - PAIN TYPE
TYPE: CHRONIC PAIN
TYPE: ACUTE PAIN
TYPE: SURGICAL PAIN;ACUTE PAIN
TYPE: CHRONIC PAIN
TYPE: CHRONIC PAIN

## 2021-04-30 NOTE — PROGRESS NOTES
Received report from previous shift RN  Assessment complete.  A&O x 4. Patient calls appropriately.  Patient ambulates with SBA assist.   Patient has 3/10 pain. Pain managed with prescribed medications.  Denies N&V. Tolerating regular diet.  + void, + flatus, 4/29 BM.  Patient denies SOB.  SCD's on.  Patient up in bed for breakfast.    Review plan with of care with patient. Call light and personal belongings with in reach. Hourly rounding in place. All needs met at this time.

## 2021-04-30 NOTE — PROGRESS NOTES
"Urology Progress Note    Post op Day # 1    Overnight Events: None    S: No fevers, chills, nausea or vomiting.  Family at bedside.  Patient just arrived back from biopsy and very tired    O:   /77   Pulse 76   Temp 37.2 °C (98.9 °F) (Temporal)   Resp 18   Ht 1.753 m (5' 9\")   Wt 88 kg (194 lb)   SpO2 97%   Recent Labs     04/27/21 2105 04/28/21  0513 04/29/21  0437   SODIUM 134*  --  138   POTASSIUM 4.1  --  3.9   CHLORIDE 98  --  103   CO2 26  --  24   GLUCOSE 81  --  69   BUN 27*  --  29*   CREATININE 2.38*  --  2.27*   CALCIUM 13.8* 13.3* 11.3*     Recent Labs     04/27/21 2105 04/29/21 0437   WBC 6.6 5.7   RBC 3.80* 3.59*   HEMOGLOBIN 9.8* 9.0*   HEMATOCRIT 30.6* 29.2*   MCV 80.5* 81.3*   MCH 25.8* 25.1*   MCHC 32.0* 30.8*   RDW 42.5 42.5   PLATELETCT 301 243   MPV 10.0 10.1         Intake/Output Summary (Last 24 hours) at 4/29/2021 1710  Last data filed at 4/29/2021 1055  Gross per 24 hour   Intake 552.5 ml   Output 275 ml   Net 277.5 ml       Exam:  Abdomen soft, benign.          A/P:    Active Hospital Problems    Diagnosis    • Hypercalcemia [E83.52]      Priority: High   • Acute kidney injury superimposed on CKD (HCC) [N17.9, N18.9]      Priority: High   • Retroperitoneal mass [R19.00]      Priority: High   • Hydronephrosis [N13.30]    • Pleural effusion on left [J90]    • Hypertension [I10]        Stable.     PLAN:  - Ambulate, IS.  - await pathology results for further recommendations.    - Urology will follow   "

## 2021-04-30 NOTE — CARE PLAN
Problem: Safety  Goal: Will remain free from injury  Outcome: PROGRESSING AS EXPECTED  Goal: Will remain free from falls  Outcome: PROGRESSING AS EXPECTED     Problem: Venous Thromboembolism (VTW)/Deep Vein Thrombosis (DVT) Prevention:  Goal: Patient will participate in Venous Thrombosis (VTE)/Deep Vein Thrombosis (DVT)Prevention Measures  Outcome: PROGRESSING AS EXPECTED     Problem: Pain Management  Goal: Pain level will decrease to patient's comfort goal  Outcome: PROGRESSING AS EXPECTED

## 2021-04-30 NOTE — PROGRESS NOTES
"Urology Progress Note    Post op Day #2    Overnight Events: None    S: No fevers, chills, nausea or vomiting.  C/o flank pain.  Family at bedside.    O:   /87   Pulse 70   Temp 37 °C (98.6 °F) (Temporal)   Resp 18   Ht 1.753 m (5' 9\")   Wt 88 kg (194 lb)   SpO2 97%   Recent Labs     04/27/21 2105 04/27/21 2105 04/28/21  0513 04/29/21 0437 04/30/21  0424   SODIUM 134*  --   --  138 135   POTASSIUM 4.1  --   --  3.9 3.6   CHLORIDE 98  --   --  103 104   CO2 26  --   --  24 22   GLUCOSE 81  --   --  69 73   BUN 27*  --   --  29* 28*   CREATININE 2.38*  --   --  2.27* 2.40*   CALCIUM 13.8*   < > 13.3* 11.3* 10.1    < > = values in this interval not displayed.     Recent Labs     04/27/21 2105 04/29/21 0437 04/30/21  0424   WBC 6.6 5.7 5.5   RBC 3.80* 3.59* 3.38*   HEMOGLOBIN 9.8* 9.0* 8.8*   HEMATOCRIT 30.6* 29.2* 27.9*   MCV 80.5* 81.3* 82.5   MCH 25.8* 25.1* 26.0*   MCHC 32.0* 30.8* 31.5*   RDW 42.5 42.5 44.2   PLATELETCT 301 243 212   MPV 10.0 10.1 10.2         Intake/Output Summary (Last 24 hours) at 4/29/2021 1710  Last data filed at 4/29/2021 1055  Gross per 24 hour   Intake 552.5 ml   Output 275 ml   Net 277.5 ml       Exam:  Abdomen soft, benign.          A/P:    Active Hospital Problems    Diagnosis    • Hypercalcemia [E83.52]      Priority: High   • Acute kidney injury superimposed on CKD (HCC) [N17.9, N18.9]      Priority: High   • Retroperitoneal mass [R19.00]      Priority: High   • Hydronephrosis [N13.30]    • Pleural effusion on left [J90]    • Hypertension [I10]        Stable.     PLAN:  - await pathology results for further recommendations.    - Urology will follow   "

## 2021-04-30 NOTE — CARE PLAN
Problem: Communication  Goal: The ability to communicate needs accurately and effectively will improve  Outcome: PROGRESSING AS EXPECTED  Note: Pt communicates needs with nursing staff and uses call light when needed.     Problem: Safety  Goal: Will remain free from falls  4/30/2021 1446 by Chani Paniagua R.N.  Outcome: PROGRESSING AS EXPECTED  Note: Bed in lowest position, pt belongings within reach. Pt utilizes call light prior to ambulating to bathroom.

## 2021-04-30 NOTE — PROGRESS NOTES
Pt is A&O 4  Pain controlled at this time on current regimen.  Refusing at this time to take PRN medication, encouraged increased movement and ambulation, states largest pain complaint is chronic back pain   - nausea  Tolerating a cardiac diet   + Voids, clear yellow  + flatus  - BM  Up SBA  SCD's on  Family updated on POC  Bed alarm n/a, pt low fall risk per astrid gutiérrez  Reviewed plan of care with patient, bed in lowest position and locked, pt resting comfortably now, call light within reach, all needs met at this time. Interventions will be executed per plan of care

## 2021-04-30 NOTE — PROGRESS NOTES
[facility] Medicine Daily Progress Note    Date of Service  4/30/2021    Chief Complaint  70 y.o. male admitted 4/27/2021 with weakness and back pain     Hospital Course  70 y.o. male with past medical history of hypertension on atenolol, CKD who presented 4/27/2021 as a transfer from [facility] for abnormal labs by PMD. Patient sates that he has been having back pain, low grade fevers, night sweats, and weight loss that started in November 2020. He characterizes his low back pain as migratory and dull, 6/10 in intensity, not relieved with anything exacerbated with movement. His PMD is Dr. Singh. He had a calcium level of 14 and was given IVF and IV 4 mg zolendronic acid on admission. Repeat CT abdomen pelvis done without contrast due to patient's kidney function showed a large retroperitoneal mass surrounding the left kidney and obstructing the left ureter, mass also surrounds the aorta superior mesenteric artery and vein. Urology consulted for hydronephrosis.  S/p cystoscopy with ureteral stent insertion. IR biopsy done 4/29.     Interval Problem Update  Vitals stable. Calcium 10.1, will dc IVF and trend. Pending pathology results. Patient has no new complaints.     Consultants/Specialty  Urology   IR    Code Status  Full Code    Disposition  Pending     Review of Systems  Review of Systems   Constitutional: Positive for malaise/fatigue and weight loss. Negative for chills and fever.   Respiratory: Negative for shortness of breath.    Cardiovascular: Negative for chest pain.   Gastrointestinal: Negative for abdominal pain, nausea and vomiting.   Genitourinary: Negative for dysuria.   Musculoskeletal: Negative for myalgias.   Skin: Negative for rash.   Neurological: Negative for dizziness and headaches.   Psychiatric/Behavioral: Negative for depression.   All other systems reviewed and are negative.       Physical Exam  Temp:  [36.8 °C (98.2 °F)-37.2 °C (98.9 °F)] 37 °C (98.6 °F)  Pulse:  [70-76] 70  Resp:  [18]  18  BP: (140-160)/(76-87) 140/87  SpO2:  [94 %-97 %] 97 %    Physical Exam  Vitals and nursing note reviewed.   Constitutional:       General: He is not in acute distress.     Appearance: Normal appearance. He is ill-appearing.   HENT:      Head: Normocephalic and atraumatic.   Eyes:      Conjunctiva/sclera: Conjunctivae normal.   Cardiovascular:      Rate and Rhythm: Normal rate and regular rhythm.      Pulses: Normal pulses.   Pulmonary:      Effort: Pulmonary effort is normal. No respiratory distress.      Breath sounds: Normal breath sounds. No wheezing.   Abdominal:      General: Abdomen is flat. There is no distension.      Palpations: Abdomen is soft.      Tenderness: There is no abdominal tenderness.   Musculoskeletal:         General: No swelling. Normal range of motion.      Cervical back: Normal range of motion and neck supple.   Skin:     General: Skin is warm and dry.      Findings: No rash.   Neurological:      General: No focal deficit present.      Mental Status: He is oriented to person, place, and time and easily aroused. He is lethargic.      Cranial Nerves: No cranial nerve deficit.   Psychiatric:         Mood and Affect: Mood normal.         Behavior: Behavior normal.         Fluids    Intake/Output Summary (Last 24 hours) at 4/30/2021 1348  Last data filed at 4/30/2021 0800  Gross per 24 hour   Intake 3000 ml   Output --   Net 3000 ml       Laboratory  Recent Labs     04/27/21 2105 04/29/21 0437 04/30/21 0424   WBC 6.6 5.7 5.5   RBC 3.80* 3.59* 3.38*   HEMOGLOBIN 9.8* 9.0* 8.8*   HEMATOCRIT 30.6* 29.2* 27.9*   MCV 80.5* 81.3* 82.5   MCH 25.8* 25.1* 26.0*   MCHC 32.0* 30.8* 31.5*   RDW 42.5 42.5 44.2   PLATELETCT 301 243 212   MPV 10.0 10.1 10.2     Recent Labs     04/27/21 2105 04/27/21 2105 04/28/21  0513 04/29/21 0437 04/30/21  0424   SODIUM 134*  --   --  138 135   POTASSIUM 4.1  --   --  3.9 3.6   CHLORIDE 98  --   --  103 104   CO2 26  --   --  24 22   GLUCOSE 81  --   --  69 73    BUN 27*  --   --  29* 28*   CREATININE 2.38*  --   --  2.27* 2.40*   CALCIUM 13.8*   < > 13.3* 11.3* 10.1    < > = values in this interval not displayed.                   Imaging  CT-NEEDLE BX-ABD-RETROPERITONL   Final Result      1.  CT GUIDED RIGHT RETROPERITONEAL BIOPSY. MASSIVE RIGHT-SIDED PARA-AORTIC ADENOPATHY WAS TARGETED.      DX-CYSTO FLUORO > 1 HOUR   Final Result      Cysto fluoroscopy utilized for 36 seconds         INTERPRETING LOCATION: [address] [facility], [place], [address]      CT-ABDOMEN-PELVIS W/O   Final Result      1.  Very large retroperitoneal mass surrounding LEFT kidney and obstructing LEFT ureter.  Mass also surrounds the aorta, superior mesenteric artery and vein.   2.  Nonobstructing RIGHT kidney stone.   3.  Minimal ascites.   4.  Evaluation is limited due to lack of IV contrast.      OUTSIDE IMAGES-DX CHEST   Final Result      OUTSIDE IMAGES-CT CHEST/ABDOMEN/PELVIS   Final Result           Assessment/Plan  * Retroperitoneal mass  Assessment & Plan  Possibly lymphoma   IR consulted, s/p biopsy   Pathology pending   Consult oncology once pathology results available     Acute kidney injury superimposed on CKD (HCC)  Assessment & Plan  Probably secondary to left sided hydronephrosis causing post obstruction failure  S/p ureteral stent   Monitor Cr  Avoid nephrotoxic agents     Hypercalcemia  Assessment & Plan  Likely secondary to malignancy will need tissue diagnosis. Had a calcium level of 14 and was given IVF and did recieve IV 4 mg zolendronic acid on 4/27/2021 at 1607.   PTH low, vit D wnl  Trend Ca level    Improving       Hypertension  Assessment & Plan  Continue with atenolol 50 mg daily     Pleural effusion on left  Assessment & Plan  Unclear suspect secondary to malignancy.   Small in size per CT L>R  Will monitor, no indication for thoracentesis at this time.    Hydronephrosis  Assessment & Plan  From mass causing obstruction.   Monitor Cr  Urology consulted, s/p cystoscopy and ureteral  stent placement 4/28        VTE prophylaxis: Lovenox

## 2021-05-01 ENCOUNTER — APPOINTMENT (OUTPATIENT)
Dept: RADIOLOGY | Facility: MEDICAL CENTER | Age: 71
DRG: 824 | End: 2021-05-01
Attending: INTERNAL MEDICINE
Payer: MEDICARE

## 2021-05-01 LAB
ALBUMIN SERPL BCP-MCNC: 3.1 G/DL (ref 3.2–4.9)
BUN SERPL-MCNC: 26 MG/DL (ref 8–22)
CALCIUM SERPL-MCNC: 9.9 MG/DL (ref 8.5–10.5)
CHLORIDE SERPL-SCNC: 104 MMOL/L (ref 96–112)
CO2 SERPL-SCNC: 23 MMOL/L (ref 20–33)
CREAT SERPL-MCNC: 2.14 MG/DL (ref 0.5–1.4)
GLUCOSE SERPL-MCNC: 79 MG/DL (ref 65–99)
HAV IGM SERPL QL IA: NORMAL
HBV CORE IGM SER QL: NORMAL
HBV SURFACE AG SER QL: NORMAL
HCV AB SER QL: NORMAL
HIV 1+2 AB+HIV1 P24 AG SERPL QL IA: NORMAL
LDH SERPL L TO P-CCNC: 157 U/L (ref 107–266)
PHOSPHATE SERPL-MCNC: 2.2 MG/DL (ref 2.5–4.5)
POTASSIUM SERPL-SCNC: 3.7 MMOL/L (ref 3.6–5.5)
SODIUM SERPL-SCNC: 136 MMOL/L (ref 135–145)
URATE SERPL-MCNC: 7.8 MG/DL (ref 2.5–8.3)

## 2021-05-01 PROCEDURE — A9270 NON-COVERED ITEM OR SERVICE: HCPCS | Performed by: STUDENT IN AN ORGANIZED HEALTH CARE EDUCATION/TRAINING PROGRAM

## 2021-05-01 PROCEDURE — 700102 HCHG RX REV CODE 250 W/ 637 OVERRIDE(OP): Performed by: STUDENT IN AN ORGANIZED HEALTH CARE EDUCATION/TRAINING PROGRAM

## 2021-05-01 PROCEDURE — 99232 SBSQ HOSP IP/OBS MODERATE 35: CPT | Performed by: INTERNAL MEDICINE

## 2021-05-01 PROCEDURE — 700111 HCHG RX REV CODE 636 W/ 250 OVERRIDE (IP): Performed by: STUDENT IN AN ORGANIZED HEALTH CARE EDUCATION/TRAINING PROGRAM

## 2021-05-01 PROCEDURE — 80069 RENAL FUNCTION PANEL: CPT

## 2021-05-01 PROCEDURE — 83615 LACTATE (LD) (LDH) ENZYME: CPT

## 2021-05-01 PROCEDURE — 80074 ACUTE HEPATITIS PANEL: CPT

## 2021-05-01 PROCEDURE — 87389 HIV-1 AG W/HIV-1&-2 AB AG IA: CPT

## 2021-05-01 PROCEDURE — 36415 COLL VENOUS BLD VENIPUNCTURE: CPT

## 2021-05-01 PROCEDURE — A9270 NON-COVERED ITEM OR SERVICE: HCPCS | Performed by: INTERNAL MEDICINE

## 2021-05-01 PROCEDURE — 770001 HCHG ROOM/CARE - MED/SURG/GYN PRIV*

## 2021-05-01 PROCEDURE — 84550 ASSAY OF BLOOD/URIC ACID: CPT

## 2021-05-01 PROCEDURE — 700102 HCHG RX REV CODE 250 W/ 637 OVERRIDE(OP): Performed by: INTERNAL MEDICINE

## 2021-05-01 PROCEDURE — 770006 HCHG ROOM/CARE - MED/SURG/GYN SEMI*

## 2021-05-01 RX ORDER — POLYETHYLENE GLYCOL 3350 17 G/17G
1 POWDER, FOR SOLUTION ORAL
Status: DISCONTINUED | OUTPATIENT
Start: 2021-05-01 | End: 2021-05-12 | Stop reason: HOSPADM

## 2021-05-01 RX ORDER — BISACODYL 10 MG
10 SUPPOSITORY, RECTAL RECTAL
Status: DISCONTINUED | OUTPATIENT
Start: 2021-05-01 | End: 2021-05-12 | Stop reason: HOSPADM

## 2021-05-01 RX ORDER — AMLODIPINE BESYLATE 5 MG/1
5 TABLET ORAL
Status: DISCONTINUED | OUTPATIENT
Start: 2021-05-01 | End: 2021-05-03

## 2021-05-01 RX ORDER — OXYCODONE HYDROCHLORIDE 5 MG/1
5 TABLET ORAL EVERY 4 HOURS PRN
Status: DISCONTINUED | OUTPATIENT
Start: 2021-05-01 | End: 2021-05-12 | Stop reason: HOSPADM

## 2021-05-01 RX ORDER — AMOXICILLIN 250 MG
2 CAPSULE ORAL 2 TIMES DAILY
Status: DISCONTINUED | OUTPATIENT
Start: 2021-05-01 | End: 2021-05-12 | Stop reason: HOSPADM

## 2021-05-01 RX ORDER — OXYCODONE HYDROCHLORIDE 10 MG/1
10 TABLET ORAL EVERY 4 HOURS PRN
Status: DISCONTINUED | OUTPATIENT
Start: 2021-05-01 | End: 2021-05-12 | Stop reason: HOSPADM

## 2021-05-01 RX ADMIN — DOCUSATE SODIUM 50 MG AND SENNOSIDES 8.6 MG 2 TABLET: 8.6; 5 TABLET, FILM COATED ORAL at 17:08

## 2021-05-01 RX ADMIN — OXYCODONE 5 MG: 5 TABLET ORAL at 17:35

## 2021-05-01 RX ADMIN — ATENOLOL 50 MG: 50 TABLET ORAL at 10:38

## 2021-05-01 RX ADMIN — LORAZEPAM 0.5 MG: 1 TABLET ORAL at 21:27

## 2021-05-01 RX ADMIN — OXYCODONE 5 MG: 5 TABLET ORAL at 21:30

## 2021-05-01 RX ADMIN — ACETAMINOPHEN 650 MG: 325 TABLET, FILM COATED ORAL at 23:18

## 2021-05-01 RX ADMIN — OMEPRAZOLE 20 MG: 20 CAPSULE, DELAYED RELEASE ORAL at 17:08

## 2021-05-01 RX ADMIN — ACETAMINOPHEN 650 MG: 325 TABLET, FILM COATED ORAL at 06:45

## 2021-05-01 RX ADMIN — AMLODIPINE BESYLATE 5 MG: 10 TABLET ORAL at 10:38

## 2021-05-01 RX ADMIN — DIBASIC SODIUM PHOSPHATE, MONOBASIC POTASSIUM PHOSPHATE AND MONOBASIC SODIUM PHOSPHATE 500 MG: 852; 155; 130 TABLET ORAL at 10:38

## 2021-05-01 RX ADMIN — ENOXAPARIN SODIUM 40 MG: 40 INJECTION SUBCUTANEOUS at 06:41

## 2021-05-01 RX ADMIN — ACETAMINOPHEN 650 MG: 325 TABLET, FILM COATED ORAL at 14:45

## 2021-05-01 ASSESSMENT — COGNITIVE AND FUNCTIONAL STATUS - GENERAL
DAILY ACTIVITIY SCORE: 24
SUGGESTED CMS G CODE MODIFIER MOBILITY: CH
SUGGESTED CMS G CODE MODIFIER DAILY ACTIVITY: CH
MOBILITY SCORE: 24

## 2021-05-01 ASSESSMENT — ENCOUNTER SYMPTOMS
SHORTNESS OF BREATH: 0
HEADACHES: 0
ABDOMINAL PAIN: 0
DEPRESSION: 0
DIZZINESS: 0
VOMITING: 0
MYALGIAS: 0
WEIGHT LOSS: 1
CHILLS: 0
NAUSEA: 0
FEVER: 0

## 2021-05-01 ASSESSMENT — PAIN DESCRIPTION - PAIN TYPE
TYPE: ACUTE PAIN
TYPE: ACUTE PAIN
TYPE: ACUTE PAIN;SURGICAL PAIN
TYPE: ACUTE PAIN
TYPE: ACUTE PAIN;SURGICAL PAIN
TYPE: ACUTE PAIN
TYPE: ACUTE PAIN

## 2021-05-01 NOTE — PROGRESS NOTES
Report received. Assessment complete.  AAOx4. Patient calls appropriately.  Pt c/o pain to left flank, Sx site.   Tylenol given.   No further interventions needed at this time. Will continue to monitor.     Pt denies N/V, SOB, or chest pain.  HENSLEY, ambulatory x 1 Standy by.    + void, LBM 4/30.    Pt is tolerating reg diet.       Plan of care discussed with patient. Fall precautions in place. Belongings and call light within reach. Educated patient to call for assistance as needed. All needs met at this time.

## 2021-05-01 NOTE — CARE PLAN
Problem: Knowledge Deficit  Goal: Knowledge of disease process/condition, treatment plan, diagnostic tests, and medications will improve  Outcome: PROGRESSING AS EXPECTED  Note: Listen to pt and family describe their understanding of the current pt condition and disease process. Clarified pt and family understanding and reinforce previous education.     Problem: Psychosocial Needs:  Goal: Level of anxiety will decrease  Outcome: PROGRESSING AS EXPECTED  Note: Family present to help with emotional well being of pt. RN provided emotional support to pt and family.

## 2021-05-01 NOTE — PROGRESS NOTES
"Urology Progress Note    Post op Day #3    Overnight Events: None    S: No fevers, chills, nausea or vomiting.  flank pain improved.  Family at bedside.  Patient states he is physically feeling good, but was just told his pathology was positive for lymphoma, so he is \"trying to process\".  Denies voiding issues.     O:   /90   Pulse 72   Temp 36.9 °C (98.4 °F) (Temporal)   Resp 18   Ht 1.753 m (5' 9\")   Wt 88 kg (194 lb)   SpO2 95%   Recent Labs     04/29/21  0437 04/30/21  0424 05/01/21  0525   SODIUM 138 135 136   POTASSIUM 3.9 3.6 3.7   CHLORIDE 103 104 104   CO2 24 22 23   GLUCOSE 69 73 79   BUN 29* 28* 26*   CREATININE 2.27* 2.40* 2.14*   CALCIUM 11.3* 10.1 9.9     Recent Labs     04/29/21 0437 04/30/21  0424   WBC 5.7 5.5   RBC 3.59* 3.38*   HEMOGLOBIN 9.0* 8.8*   HEMATOCRIT 29.2* 27.9*   MCV 81.3* 82.5   MCH 25.1* 26.0*   MCHC 30.8* 31.5*   RDW 42.5 44.2   PLATELETCT 243 212   MPV 10.1 10.2         Intake/Output Summary (Last 24 hours) at 4/29/2021 1710  Last data filed at 4/29/2021 1055  Gross per 24 hour   Intake 552.5 ml   Output 275 ml   Net 277.5 ml       Exam:  Abdomen soft, benign.          A/P:    Active Hospital Problems    Diagnosis    • Hypercalcemia [E83.52]      Priority: High   • Acute kidney injury superimposed on CKD (HCC) [N17.9, N18.9]      Priority: High   • Retroperitoneal mass [R19.00]      Priority: High   • Hydronephrosis [N13.30]    • Pleural effusion on left [J90]    • Hypertension [I10]        Stable.     PLAN:  - Prelim pathology positive for aggressive B-cell lymphoma, pending final results. Oncology consulted and planning on seeing patient this weekend.       - continue to monitor Cr.  - Urology will sign off for now.  We are available for any questions/concerns.     "

## 2021-05-01 NOTE — CARE PLAN
Problem: Communication  Goal: The ability to communicate needs accurately and effectively will improve  Outcome: PROGRESSING AS EXPECTED     Problem: Safety  Goal: Will remain free from injury  Outcome: PROGRESSING AS EXPECTED  Goal: Will remain free from falls  Outcome: PROGRESSING AS EXPECTED     Problem: Venous Thromboembolism (VTW)/Deep Vein Thrombosis (DVT) Prevention:  Goal: Patient will participate in Venous Thrombosis (VTE)/Deep Vein Thrombosis (DVT)Prevention Measures  Outcome: PROGRESSING AS EXPECTED     Problem: Pain Management  Goal: Pain level will decrease to patient's comfort goal  Outcome: PROGRESSING AS EXPECTED     Problem: Mobility  Goal: Risk for activity intolerance will decrease  Outcome: PROGRESSING AS EXPECTED     Problem: Psychosocial Needs:  Goal: Level of anxiety will decrease  Outcome: PROGRESSING AS EXPECTED

## 2021-05-01 NOTE — PROGRESS NOTES
Bedside report received. Assessment completed.  Pt is A&O x4. Pt on room air.   Pt declines pain intervention at this time  Denies nausea.   - numbness, - tingling.  Skin  Has L back/flank surgical wound.   LDA 20 RFA   Last BM 4/30 . +flatus,   +void.  cardiac diet. Tolerates well.   Pt up self.  Requested that pt be moved to a private room once one is available due to his recent cancer diagnosis.  Call light and belongings within reach. All needs met at this time. Fall Precautions and hourly rounding in place.

## 2021-05-01 NOTE — PROGRESS NOTES
[facility] Medicine Daily Progress Note    Date of Service  5/1/2021    Chief Complaint  70 y.o. male admitted 4/27/2021 with weakness and back pain     Hospital Course  70 y.o. male with past medical history of hypertension on atenolol, CKD who presented 4/27/2021 as a transfer from [facility] for abnormal labs by PMD. Patient sates that he has been having back pain, low grade fevers, night sweats, and weight loss that started in November 2020. He characterizes his low back pain as migratory and dull, 6/10 in intensity, not relieved with anything exacerbated with movement. His PMD is Dr. Singh. He had a calcium level of 14 and was given IVF and IV 4 mg zolendronic acid on admission. Repeat CT abdomen pelvis done without contrast due to patient's kidney function showed a large retroperitoneal mass surrounding the left kidney and obstructing the left ureter, mass also surrounds the aorta superior mesenteric artery and vein. Urology consulted for hydronephrosis.  S/p cystoscopy with ureteral stent insertion. IR biopsy done 4/29.     Interval Problem Update  No acute events overnight.  Calcium down to 9.9 today and creatinine improving 2.14.  Patient's blood pressure is elevated will start amlodipine.  Pathology preliminary results aggressive B-cell lymphoma, oncology has been consulted recommended uric acid, LDH, hepatitis panel, HIV, echo and PET scan as patient cannot receive IV contrast due to his CKD.    Long discussion with patient, wife, and niece at bedside. Discussed results of biopsy and oncology recommendations thus far. Oncology to see the patient this weekend. They expressed concern over the PET scan as patient has a history of not doing well with [facility]. All questions answered.     Consultants/Specialty  Urology   IR  Oncology     Code Status  Full Code    Disposition  Pending     Review of Systems  Review of Systems   Constitutional: Positive for  malaise/fatigue and weight loss. Negative for chills and fever.   Respiratory: Negative for shortness of breath.    Cardiovascular: Negative for chest pain.   Gastrointestinal: Negative for abdominal pain, nausea and vomiting.   Genitourinary: Negative for dysuria.   Musculoskeletal: Negative for myalgias.   Skin: Negative for rash.   Neurological: Negative for dizziness and headaches.   Psychiatric/Behavioral: Negative for depression.   All other systems reviewed and are negative.       Physical Exam  Temp:  [36.8 °C (98.3 °F)-37.2 °C (98.9 °F)] 36.9 °C (98.4 °F)  Pulse:  [63-77] 72  Resp:  [18] 18  BP: (148-172)/(74-93) 160/90  SpO2:  [93 %-95 %] 95 %    Physical Exam  Vitals and nursing note reviewed.   Constitutional:       General: He is not in acute distress.     Appearance: Normal appearance. He is ill-appearing.   HENT:      Head: Normocephalic and atraumatic.   Eyes:      Conjunctiva/sclera: Conjunctivae normal.   Cardiovascular:      Rate and Rhythm: Normal rate and regular rhythm.      Pulses: Normal pulses.   Pulmonary:      Effort: Pulmonary effort is normal. No respiratory distress.      Breath sounds: Normal breath sounds. No wheezing.   Abdominal:      General: Abdomen is flat. There is no distension.      Palpations: Abdomen is soft.      Tenderness: There is no abdominal tenderness.   Musculoskeletal:         General: No swelling. Normal range of motion.      Cervical back: Normal range of motion and neck supple.   Skin:     General: Skin is warm and dry.      Findings: No rash.   Neurological:      General: No focal deficit present.      Mental Status: He is alert, oriented to person, place, and time and easily aroused.      Cranial Nerves: No cranial nerve deficit.   Psychiatric:         Mood and Affect: Mood normal.         Behavior: Behavior normal.         Fluids    Intake/Output Summary (Last 24 hours) at 5/1/2021 1229  Last data filed at 5/1/2021 0600  Gross per 24 hour   Intake 1290 ml    Output 700 ml   Net 590 ml       Laboratory  Recent Labs     04/29/21 0437 04/30/21  0424   WBC 5.7 5.5   RBC 3.59* 3.38*   HEMOGLOBIN 9.0* 8.8*   HEMATOCRIT 29.2* 27.9*   MCV 81.3* 82.5   MCH 25.1* 26.0*   MCHC 30.8* 31.5*   RDW 42.5 44.2   PLATELETCT 243 212   MPV 10.1 10.2     Recent Labs     04/29/21  0437 04/30/21  0424 05/01/21  0525   SODIUM 138 135 136   POTASSIUM 3.9 3.6 3.7   CHLORIDE 103 104 104   CO2 24 22 23   GLUCOSE 69 73 79   BUN 29* 28* 26*   CREATININE 2.27* 2.40* 2.14*   CALCIUM 11.3* 10.1 9.9                   Imaging  CT-NEEDLE BX-ABD-RETROPERITONL   Final Result      1.  CT GUIDED RIGHT RETROPERITONEAL BIOPSY. MASSIVE RIGHT-SIDED PARA-AORTIC ADENOPATHY WAS TARGETED.      DX-CYSTO FLUORO > 1 HOUR   Final Result      Cysto fluoroscopy utilized for 36 seconds         INTERPRETING LOCATION: [address] [place] [address], [place], [address]      CT-ABDOMEN-PELVIS W/O   Final Result      1.  Very large retroperitoneal mass surrounding LEFT kidney and obstructing LEFT ureter.  Mass also surrounds the aorta, superior mesenteric artery and vein.   2.  Nonobstructing RIGHT kidney stone.   3.  Minimal ascites.   4.  Evaluation is limited due to lack of IV contrast.      OUTSIDE IMAGES-DX CHEST   Final Result      OUTSIDE IMAGES-CT CHEST/ABDOMEN/PELVIS   Final Result      EC-ECHOCARDIOGRAM COMPLETE W/ CONT    (Results Pending)   CT-PETCT-WHOLE BODY    (Results Pending)        Assessment/Plan  * Retroperitoneal mass  Assessment & Plan  Prelim pathology positive for aggressive B-cell lymphoma, pending final results   IR consulted, s/p biopsy   Oncology consulted  -echo pending   -bone marrow bx, likely to be done on Monday   -check uric acid, LDH, hepatitis, and HIV   -PET CT scan, due to CKD cannot get IV contrasted studies    Acute kidney injury superimposed on CKD (HCC)  Assessment & Plan  Probably secondary to left sided hydronephrosis causing post obstruction failure  S/p ureteral stent   Monitor Cr  Avoid nephrotoxic  agents     Hypercalcemia  Assessment & Plan  Likely secondary to malignancy will need tissue diagnosis. Had a calcium level of 14 and was given IVF and did recieve IV 4 mg zolendronic acid on 4/27/2021 at 1607.   PTH low, vit D wnl  Trend Ca level    Improving       Hypertension  Assessment & Plan  Continue with atenolol 50 mg daily   Start amlodipine     Pleural effusion on left  Assessment & Plan  Unclear suspect secondary to malignancy.   Small in size per CT L>R  Will monitor, no indication for thoracentesis at this time.    Hydronephrosis  Assessment & Plan  From mass causing obstruction.   Monitor Cr  Urology consulted, s/p cystoscopy and ureteral stent placement 4/28        VTE prophylaxis: Lovenox

## 2021-05-02 ENCOUNTER — APPOINTMENT (OUTPATIENT)
Dept: RADIOLOGY | Facility: MEDICAL CENTER | Age: 71
DRG: 824 | End: 2021-05-02
Attending: INTERNAL MEDICINE
Payer: MEDICARE

## 2021-05-02 ENCOUNTER — APPOINTMENT (OUTPATIENT)
Dept: CARDIOLOGY | Facility: MEDICAL CENTER | Age: 71
DRG: 824 | End: 2021-05-02
Attending: INTERNAL MEDICINE
Payer: MEDICARE

## 2021-05-02 LAB
ALBUMIN SERPL BCP-MCNC: 2.8 G/DL (ref 3.2–4.9)
BUN SERPL-MCNC: 24 MG/DL (ref 8–22)
CALCIUM SERPL-MCNC: 9.5 MG/DL (ref 8.5–10.5)
CHLORIDE SERPL-SCNC: 104 MMOL/L (ref 96–112)
CO2 SERPL-SCNC: 24 MMOL/L (ref 20–33)
CREAT SERPL-MCNC: 2 MG/DL (ref 0.5–1.4)
GLUCOSE SERPL-MCNC: 92 MG/DL (ref 65–99)
LV EJECT FRACT  99904: 65
LV EJECT FRACT MOD 2C 99903: 70.22
LV EJECT FRACT MOD 4C 99902: 63.56
LV EJECT FRACT MOD BP 99901: 66.04
PHOSPHATE SERPL-MCNC: 2.7 MG/DL (ref 2.5–4.5)
POTASSIUM SERPL-SCNC: 3.8 MMOL/L (ref 3.6–5.5)
SODIUM SERPL-SCNC: 137 MMOL/L (ref 135–145)
URATE SERPL-MCNC: 7.7 MG/DL (ref 2.5–8.3)

## 2021-05-02 PROCEDURE — 700102 HCHG RX REV CODE 250 W/ 637 OVERRIDE(OP): Performed by: INTERNAL MEDICINE

## 2021-05-02 PROCEDURE — 99498 ADVNCD CARE PLAN ADDL 30 MIN: CPT | Performed by: NURSE PRACTITIONER

## 2021-05-02 PROCEDURE — 93306 TTE W/DOPPLER COMPLETE: CPT

## 2021-05-02 PROCEDURE — 80069 RENAL FUNCTION PANEL: CPT

## 2021-05-02 PROCEDURE — 36415 COLL VENOUS BLD VENIPUNCTURE: CPT

## 2021-05-02 PROCEDURE — 700111 HCHG RX REV CODE 636 W/ 250 OVERRIDE (IP): Performed by: STUDENT IN AN ORGANIZED HEALTH CARE EDUCATION/TRAINING PROGRAM

## 2021-05-02 PROCEDURE — 84550 ASSAY OF BLOOD/URIC ACID: CPT

## 2021-05-02 PROCEDURE — 770004 HCHG ROOM/CARE - ONCOLOGY PRIVATE *

## 2021-05-02 PROCEDURE — A9270 NON-COVERED ITEM OR SERVICE: HCPCS | Performed by: STUDENT IN AN ORGANIZED HEALTH CARE EDUCATION/TRAINING PROGRAM

## 2021-05-02 PROCEDURE — 700102 HCHG RX REV CODE 250 W/ 637 OVERRIDE(OP): Performed by: STUDENT IN AN ORGANIZED HEALTH CARE EDUCATION/TRAINING PROGRAM

## 2021-05-02 PROCEDURE — 99497 ADVNCD CARE PLAN 30 MIN: CPT | Performed by: NURSE PRACTITIONER

## 2021-05-02 PROCEDURE — A9270 NON-COVERED ITEM OR SERVICE: HCPCS | Performed by: INTERNAL MEDICINE

## 2021-05-02 PROCEDURE — 93306 TTE W/DOPPLER COMPLETE: CPT | Mod: 26 | Performed by: INTERNAL MEDICINE

## 2021-05-02 PROCEDURE — 99232 SBSQ HOSP IP/OBS MODERATE 35: CPT | Performed by: INTERNAL MEDICINE

## 2021-05-02 RX ORDER — CHOLECALCIFEROL (VITAMIN D3) 125 MCG
5 CAPSULE ORAL NIGHTLY PRN
Status: DISCONTINUED | OUTPATIENT
Start: 2021-05-02 | End: 2021-05-12 | Stop reason: HOSPADM

## 2021-05-02 RX ORDER — LORAZEPAM 1 MG/1
1 TABLET ORAL EVERY 4 HOURS PRN
Status: DISCONTINUED | OUTPATIENT
Start: 2021-05-02 | End: 2021-05-12 | Stop reason: HOSPADM

## 2021-05-02 RX ADMIN — DOCUSATE SODIUM 50 MG AND SENNOSIDES 8.6 MG 2 TABLET: 8.6; 5 TABLET, FILM COATED ORAL at 17:07

## 2021-05-02 RX ADMIN — LORAZEPAM 1 MG: 1 TABLET ORAL at 21:26

## 2021-05-02 RX ADMIN — OXYCODONE 5 MG: 5 TABLET ORAL at 14:53

## 2021-05-02 RX ADMIN — ENOXAPARIN SODIUM 40 MG: 40 INJECTION SUBCUTANEOUS at 05:25

## 2021-05-02 RX ADMIN — OXYCODONE HYDROCHLORIDE 10 MG: 10 TABLET ORAL at 05:25

## 2021-05-02 RX ADMIN — DOCUSATE SODIUM 50 MG AND SENNOSIDES 8.6 MG 2 TABLET: 8.6; 5 TABLET, FILM COATED ORAL at 05:26

## 2021-05-02 RX ADMIN — AMLODIPINE BESYLATE 5 MG: 10 TABLET ORAL at 05:25

## 2021-05-02 RX ADMIN — OMEPRAZOLE 20 MG: 20 CAPSULE, DELAYED RELEASE ORAL at 17:07

## 2021-05-02 RX ADMIN — ATENOLOL 50 MG: 50 TABLET ORAL at 12:04

## 2021-05-02 RX ADMIN — OXYCODONE 5 MG: 5 TABLET ORAL at 19:19

## 2021-05-02 RX ADMIN — ACETAMINOPHEN 650 MG: 325 TABLET, FILM COATED ORAL at 21:26

## 2021-05-02 ASSESSMENT — ENCOUNTER SYMPTOMS
VOMITING: 0
CHILLS: 0
WEIGHT LOSS: 1
FEVER: 0
DIZZINESS: 0
DEPRESSION: 0
SHORTNESS OF BREATH: 0
MYALGIAS: 0
HEADACHES: 0
NAUSEA: 0
ABDOMINAL PAIN: 0

## 2021-05-02 ASSESSMENT — PAIN DESCRIPTION - PAIN TYPE
TYPE: CHRONIC PAIN
TYPE: ACUTE PAIN
TYPE: ACUTE PAIN;CHRONIC PAIN
TYPE: ACUTE PAIN
TYPE: ACUTE PAIN;CHRONIC PAIN
TYPE: CHRONIC PAIN
TYPE: CHRONIC PAIN
TYPE: ACUTE PAIN

## 2021-05-02 NOTE — PROGRESS NOTES
[facility] Medicine Daily Progress Note    Date of Service  5/2/2021    Chief Complaint  70 y.o. male admitted 4/27/2021 with weakness and back pain     Hospital Course  70 y.o. male with past medical history of hypertension on atenolol, CKD who presented 4/27/2021 as a transfer from [facility] for abnormal labs by PMD. Patient sates that he has been having back pain, low grade fevers, night sweats, and weight loss that started in November 2020. He characterizes his low back pain as migratory and dull, 6/10 in intensity, not relieved with anything exacerbated with movement. His PMD is Dr. Singh. He had a calcium level of 14 and was given IVF and IV 4 mg zolendronic acid on admission. Repeat CT abdomen pelvis done without contrast due to patient's kidney function showed a large retroperitoneal mass surrounding the left kidney and obstructing the left ureter, mass also surrounds the aorta superior mesenteric artery and vein. Urology consulted for hydronephrosis.  S/p cystoscopy with ureteral stent insertion. IR biopsy done 4/29.  Pathology preliminary results aggressive B-cell lymphoma, oncology has been consulted.      Interval Problem Update  Creatinine improved 2.0 today. Uric acid wnl, hep panel and hiv negative. Echo pending. Palliative consulted. Discussed with oncology, will have patient moved to the oncology floor, bone marrow biopsy planned for tomorrow. Patient states he is feeling better he has more energy, but is having anxiety and difficulty sleeping, added melatonin prn and increased oral ativan    Consultants/Specialty  Urology   IR  Oncology   Palliative Care     Code Status  Full Code    Disposition  Pending     Review of Systems  Review of Systems   Constitutional: Positive for malaise/fatigue and weight loss. Negative for chills and fever.   Respiratory: Negative for shortness of breath.    Cardiovascular: Negative for chest pain.   Gastrointestinal: Negative for abdominal pain, nausea and  vomiting.   Genitourinary: Negative for dysuria.   Musculoskeletal: Negative for myalgias.   Skin: Negative for rash.   Neurological: Negative for dizziness and headaches.   Psychiatric/Behavioral: Negative for depression.   All other systems reviewed and are negative.       Physical Exam  Temp:  [36 °C (96.8 °F)-36.9 °C (98.5 °F)] 36 °C (96.8 °F)  Pulse:  [61-69] 69  Resp:  [16-18] 17  BP: (126-168)/(68-86) 126/68  SpO2:  [95 %-98 %] 95 %    Physical Exam  Vitals and nursing note reviewed.   Constitutional:       General: He is not in acute distress.     Appearance: Normal appearance. He is ill-appearing.   HENT:      Head: Normocephalic and atraumatic.   Eyes:      Conjunctiva/sclera: Conjunctivae normal.   Cardiovascular:      Rate and Rhythm: Normal rate and regular rhythm.      Pulses: Normal pulses.   Pulmonary:      Effort: Pulmonary effort is normal. No respiratory distress.      Breath sounds: Normal breath sounds. No wheezing.   Abdominal:      General: Abdomen is flat. There is no distension.      Palpations: Abdomen is soft.      Tenderness: There is no abdominal tenderness.   Musculoskeletal:         General: No swelling. Normal range of motion.      Cervical back: Normal range of motion and neck supple.   Skin:     General: Skin is warm and dry.   Neurological:      General: No focal deficit present.      Mental Status: He is alert, oriented to person, place, and time and easily aroused. Mental status is at baseline.   Psychiatric:         Mood and Affect: Mood normal.         Behavior: Behavior normal.         Fluids    Intake/Output Summary (Last 24 hours) at 5/2/2021 1105  Last data filed at 5/2/2021 0940  Gross per 24 hour   Intake 1470 ml   Output 1000 ml   Net 470 ml       Laboratory  Recent Labs     04/30/21 0424   WBC 5.5   RBC 3.38*   HEMOGLOBIN 8.8*   HEMATOCRIT 27.9*   MCV 82.5   MCH 26.0*   MCHC 31.5*   RDW 44.2   PLATELETCT 212   MPV 10.2     Recent Labs     04/30/21 0424 05/01/21  2196  05/02/21  0304   SODIUM 135 136 137   POTASSIUM 3.6 3.7 3.8   CHLORIDE 104 104 104   CO2 22 23 24   GLUCOSE 73 79 92   BUN 28* 26* 24*   CREATININE 2.40* 2.14* 2.00*   CALCIUM 10.1 9.9 9.5                   Imaging  CT-NEEDLE BX-ABD-RETROPERITONL   Final Result      1.  CT GUIDED RIGHT RETROPERITONEAL BIOPSY. MASSIVE RIGHT-SIDED PARA-AORTIC ADENOPATHY WAS TARGETED.      DX-CYSTO FLUORO > 1 HOUR   Final Result      Cysto fluoroscopy utilized for 36 seconds         INTERPRETING LOCATION: [address], [place], [address]      CT-ABDOMEN-PELVIS W/O   Final Result      1.  Very large retroperitoneal mass surrounding LEFT kidney and obstructing LEFT ureter.  Mass also surrounds the aorta, superior mesenteric artery and vein.   2.  Nonobstructing RIGHT kidney stone.   3.  Minimal ascites.   4.  Evaluation is limited due to lack of IV contrast.      OUTSIDE IMAGES-DX CHEST   Final Result      OUTSIDE IMAGES-CT CHEST/ABDOMEN/PELVIS   Final Result      EC-ECHOCARDIOGRAM COMPLETE W/ CONT    (Results Pending)   NM-PETCT-WHOLE BODY    (Results Pending)        Assessment/Plan  * Retroperitoneal mass  Assessment & Plan  Prelim pathology positive for aggressive B-cell lymphoma, pending final results   IR consulted, s/p biopsy   Oncology consulted  -echo pending   -bone marrow bx, likely to be done on Monday   -check uric acid, LDH, hepatitis, and HIV   -PET CT scan, due to CKD cannot get IV contrasted studies    Acute kidney injury superimposed on CKD (HCC)  Assessment & Plan  Probably secondary to left sided hydronephrosis causing post obstruction failure  S/p ureteral stent   Monitor Cr  Avoid nephrotoxic agents     improving    Hypercalcemia  Assessment & Plan  Likely secondary to malignancy will need tissue diagnosis. Had a calcium level of 14 and was given IVF and did recieve IV 4 mg zolendronic acid on 4/27/2021 at 1607.   PTH low, vit D wnl  Trend Ca level  IVF stopped    Improving       Hypertension  Assessment & Plan  Continue  with atenolol and amlodipine     Pleural effusion on left  Assessment & Plan  Unclear suspect secondary to malignancy.   Small in size per CT L>R  Will monitor, no indication for thoracentesis at this time.    Hydronephrosis  Assessment & Plan  From mass causing obstruction.   Monitor Cr  Urology consulted, s/p cystoscopy and ureteral stent placement 4/28        VTE prophylaxis: Lovenox     I have performed a physical exam and reviewed and updated ROS and Plan today (5/2/2021). In review of yesterday's note (5/1/2021), there are no changes except as documented above.

## 2021-05-02 NOTE — PROGRESS NOTES
Pt arrived to unit via hospital bed. Pt alert and oriented x4. Pt oriented to room, and advised to call prior to getting out of bed. PIV flushing with no blood return. Pt reports pain in lower back, declines pain medication at this time. Pt sitting up in bed at this time eating lunch. Call light within reach, bed in lowest locked position. Hourly rounding in place.

## 2021-05-02 NOTE — CONSULTS
Reason for Palliative Care Consult: Advance Care Planning    Consulted by: Dr. Ruiz    HPI: Khalif Kirkpatrick is a 70-year-old male transferred from [facility] after being sent there from his primary care physician for abnormal labs.  Patient was found to have calcium level was 14, acute kidney injury superimposed on CKD, lymphadenopathy, hydronephrosis, and left-sided pleural effusion.  He was given IV fluids and Zoledronic acid (Zometa) and was transferred to [facility] for higher level of care due to concern for malignancy.  Patient has been experiencing seeing a cluster of symptoms since November 2020 including worsening and different quality back pain different from his chronic pain (patient is a  and has some chronic back pain), low-grade fevers, night sweats, and weight loss.  CT imaging 4/28 revealed retroperitoneal mass surrounding the aorta, superior mesentery artery and vein, left kidney, and obstructing the left ureter.  Patient underwent cystoscopy, left pyelogram, and left urethral stent placement 4/28.  He underwent CT-guided retroperitoneal mass biopsy 4/29; preliminary biopsy results showed aggressive B-cell lymphoma FISH analysis pending.  He was seen by oncologist Dr. Barahona who recommended echocardiogram, double-lumen PICC line placement, allopurinol, bone marrow biopsy, PET/CT if possible due to the patient potentially needing to start chemotherapy ASAP.  Consider nephrology consult if creatinine does not improve due to risk for tumor lysis once treatment is started per Dr. Barahona.    Past medical/surgical history: Hypertension and COPD.    Additional consults:   Urology  Oncology    Assessment:  Neuro: Awake, alert, and oriented x4.  Dyspnea: Yes; intermittently since patient injured his ribs long ago.  He does not feel short of breath at rest.  Last BM: 05/02/21  Pain: Yes; back pain.  Patient reports his pain has been well controlled since he has  "been in the hospital.  Depression: Mood appropriate for situation.    Dementia: No      Living situation & psychosocial: Patient lives 12 miles outside of [facility] with his wife/reported HC MARIA LUISA Burrows \"D\" Kaya [phone].  He has an adult daughter Sonya Reyes and granddaughter Annetta \"\" Strange-Reyes.     Spiritual:  Is Episcopalian or spirituality important for coping with this illness? Yes; reports his spirituality is \"more on the inside.\"  Has a  or spiritual provider visit been requested? No; declined by patient.  He was educated regarding services and encouraged to request a spiritual care visit anytime as needed.    Palliative Performance Scale: 60%    Advance Directive: None on file.  Patient's wife confirmed he has completed a healthcare directive and she is HC POA.  She is able to bring a copy and granddaughter Annetta is also able to e-mail a copy to me.  DPOA: None on file.  POLST: None on file.    Code Status: Full    Outcome:  Met with patient, patient's wife D, and granddaughter  at patient's bedside. Introduced myself and explained the role of palliative care.  Patient and family expressed feeling anxiety over patient's new diagnosis, all the information they have received, and the plan of care.  Patient's granddaughter  expressed the hardest part is not knowing what is can happen and waiting for things to start.    Affirmed their feelings.  Reiterated plan of care outlined by Dr. Barahona and answered a myriad of questions regarding patient's cancer diagnosis, treatment plan, typical turnaround time for bone marrow biopsy results and possible treatment.  Patient's wife inquired about treatment once patient is done with initial treatment and if this might be outpatient and here in [place].  Confirmed patient would eventually have the plan to discharge when medically cleared and then do outpatient follow-up.  Reiterated that plan will be contingent on the rest of his test " results and outlined by oncologist.  Recommended cancer nurse navigator consultation to assist with plan and providing resources to family.    Patient's granddaughter inquired about the patient's nutrition and if he needed to be on a special diet.  Offered to have a nutritionist see the patient while he is inpatient to discuss supplements but patient declined at this time as he feels overwhelmed.  I will revisit this with him at a later time.  Discussed strategies to lower anxiety with both patient and his family.  Reassured them that our team is here to support them through this journey as well as the rest of the medical team.  They appreciated hearing plan of care again and clarification of questions.  Patient's granddaughter requested Pet therapy (dog) to visit.  Will reach out to volunteer services Monday.  All questions answered and concerns addressed.  Encourage patient to keep lines of communication open with his symptoms, needs, concerns, and wishes.    Provided therapeutic communication including open ended questions, therapeutic presence, reflective listening, and validation of thoughts/feelings throughout encounter. Provided palliative care contact information and encouraged patient and family to call with any questions or needs.     Plan: Follow clinical course and assist patient in discussing goals of care while supporting patient and family.  Family to provide copy of advance directive.  I will request pet therapy tomorrow with volunteer services.  Cancer nurse navigation consult placed.    Recommendations: I do not recommend an ethics or hospice consult at this time because patient seeking cancer work-up and treatment.    Thank you for allowing Palliative Care to participate in this patient's care. Please call our team with questions and/or additional needs.    Total visit time was 50 minutes discussing advance care planning.     STORM Rajput.[email or web address].  Palliative Care Nurse  [facility]  [phone]

## 2021-05-02 NOTE — PROGRESS NOTES
Bedside report received. Assessment completed.  Pt is A&O x4. Pt on room air.   Pt declines pain intervention at this time  Denies nausea.   - numbness, - tingling.  Skin  Has L back/flank surgical wound.            LDA 20 RFA   Last BM 5/2 . +flatus,   +void.  cardiac diet. Tolerates well.   Pt up self.  Pt transferring to R322.  Discussed POC with pt and family.  Melatonin ordered to help with sleep. Ativan increased to 1mg for nighttime anxiety.  Call light and belongings within reach. All needs met at this time. Fall Precautions and hourly rounding in place.

## 2021-05-02 NOTE — CONSULTS
Consult Note: Hematology/Oncology    Date of consultation: 5/2/2021 10:38 AM    Referring provider: Dr. Ruiz    Reason for consultation: Lymphoma    History of presenting illness:     70-year-old gentleman who does have a significant past medical history per the patient.  He tells me that he started feeling may be some low back pain that was different from his typical pain last fall.  He said he was a .  He said he is always had chronic pain.  However he said this was different.  He said he put up with it for quite some time and ultimately had to get evaluated.  He saw his local physician.  He was transferred from [facility] for abnormal labs.  This was done on 4/27/2021.  The patient was found to have hypercalcemia.  His calcium level was 14.  He was given hydration.  He has since been given Zometa.  He was also found to have hydronephrosis on imaging.  He has had a stent placed by urology.  He had lymphadenopathy on imaging causing this obstruction.  He had a CT scan on 4/28/2021 which showed large retroperitoneal mass around the aorta and left kidney extending into the pelvis.  The mass also went into the mesenteric root and around the superior mesenteric artery and vein.  There is no other obvious lymphadenopathy elsewhere.  However this was not a contrasted study.    His creatinine was 2.38 on admission on 4/27/2021.  His calcium was 13.8 with an albumin of 3.5.  Creatinine today on 5/2/2021 is 2.  His albumin is 2.8.  His calcium is down to 9.5.  He has normal LDH.  It is 157.  Uric acid 7.7.  He had a negative hepatitis panel.  His HIV is negative.  PTH level was 4.1.  Retroperitoneal biopsy on 4/29/2021.  This preliminarily is come back as an aggressive lymphoma.        Past Medical History:    Past Medical History:   Diagnosis Date   • Chronic obstructive pulmonary disease (HCC)    • Hypertension        Past surgical history:    Past Surgical History:   Procedure Laterality Date   • PB  CYSTOSCOPY,INSERT URETERAL STENT Left 4/28/2021    Procedure: CYSTOSCOPY, WITH URETERAL STENT INSERTION;  Surgeon: Jorge Sotelo M.D.;  Location: SURGERY [facility];  Service: Urology       Allergies:  Ibuprofen    Medications:    Current Facility-Administered Medications   Medication Dose Route Frequency Provider Last Rate Last Admin   • amLODIPine (NORVASC) tablet 5 mg  5 mg Oral Q DAY Hailee Ruiz D.O.   5 mg at 05/02/21 0525   • oxyCODONE immediate-release (ROXICODONE) tablet 5 mg  5 mg Oral Q4HRS PRN Hailee Ruiz D.O.   5 mg at 05/01/21 2130   • oxyCODONE immediate release (ROXICODONE) tablet 10 mg  10 mg Oral Q4HRS PRN Hailee Ruiz D.O.   10 mg at 05/02/21 0525   • bisacodyl (DULCOLAX) suppository 10 mg  10 mg Rectal QDAY PRN LAURA Taylor.O.       • senna-docusate (PERICOLACE or SENOKOT S) 8.6-50 MG per tablet 2 tablet  2 tablet Oral BID LAURA Taylor.O.   2 tablet at 05/02/21 0526   • polyethylene glycol/lytes (MIRALAX) PACKET 1 Packet  1 Packet Oral QDAY PRN Hailee Ruiz D.O.       • magnesium hydroxide (MILK OF MAGNESIA) suspension 30 mL  30 mL Oral QDAY PRN Hailee Ruiz D.O.       • omeprazole (PRILOSEC) capsule 20 mg  20 mg Oral DAILY Hailee Ruiz D.O.   20 mg at 05/01/21 1708   • LORazepam (ATIVAN) tablet 0.5 mg  0.5 mg Oral Q4HRS PRN Hailee Ruiz D.O.   0.5 mg at 05/01/21 2127   • atenolol (TENORMIN) tablet 50 mg  50 mg Oral Q DAY Antonio Ford M.D.   50 mg at 05/01/21 1038   • enoxaparin (LOVENOX) inj 40 mg  40 mg Subcutaneous DAILY Antonio Ford M.D.   40 mg at 05/02/21 0525   • acetaminophen (Tylenol) tablet 650 mg  650 mg Oral Q4HRS PRN Antonio Ford M.D.   650 mg at 05/01/21 0842       Social History:     Social History     Socioeconomic History   • Marital status:      Spouse name: Not on file   • Number of children: Not on file   • Years of education: Not on file   • Highest education level: Not on file   Occupational History   • Not on file    Tobacco Use   • Smoking status: Never Smoker   Substance and Sexual Activity   • Alcohol use: Not Currently   • Drug use: Yes     Types: Inhaled     Comment: marijuana   • Sexual activity: Not on file   Other Topics Concern   • Not on file   Social History Narrative   • Not on file     Social Determinants of Health     Financial Resource Strain:    • Difficulty of Paying Living Expenses:    Food Insecurity:    • Worried About Running Out of Food in the Last Year:    • Ran Out of Food in the Last Year:    Transportation Needs:    • Lack of Transportation (Medical):    • Lack of Transportation (Non-Medical):    Physical Activity:    • Days of Exercise per Week:    • Minutes of Exercise per Session:    Stress:    • Feeling of Stress :    Social Connections:    • Frequency of Communication with Friends and Family:    • Frequency of Social Gatherings with Friends and Family:    • Attends [employer] Services:    • Active Member of Clubs or Organizations:    • Attends Club or Organization Meetings:    • Marital Status:    Intimate Partner Violence:    • Fear of Current or Ex-Partner:    • Emotionally Abused:    • Physically Abused:    • Sexually Abused:        Family History:   History reviewed. No pertinent family history.    Review of Systems:  All other review of systems are negative except what was mentioned above in the HPI.    Constitutional: He has had some night sweats , chills, weight loss ,malaise/fatigue.    HEENT: No new auditory or visual complaints. No sore throat and neck pain.     Respiratory:No new cough, sputum production, shortness of breath and wheezing.    Cardiovascular: No new chest pain, palpitations, orthopnea and leg swelling.    Gastrointestinal: No heartburn, nausea, vomiting ,abdominal pain, hematochezia or melena     Genitourinary: Negative for dysuria, hematuria    Musculoskeletal: He has had chronic low back pain but increased since last fall  Skin: Negative for rash and itching.   "  Neurological: Negative for focal weakness or headaches.    Endo/Heme/Allergies: No abnormal bleed/bruise.    Psychiatric/Behavioral: No new depression/anxiety.    Physical Exam:   Vitals:   /68   Pulse 69   Temp 36 °C (96.8 °F) (Temporal)   Resp 17   Ht 1.753 m (5' 9\")   Wt 88 kg (194 lb)   SpO2 95%   BMI 28.65 kg/m²     General: Not in acute distress, alert and oriented x 3  HEENT: No pallor, icterus. Oropharynx clear.   Neck: Supple, no palpable masses.  Lymph nodes: No palpable cervical, supraclavicular, axillary   CVS: regular rate and rhythm, no rubs or gallops  RESP: Clear to auscultate bilaterally  ABD: Soft, non tender, non distended, positive bowel sounds, no palpable organomegaly  EXT: No edema or cyanosis  CNS: Alert and oriented x3, No focal deficits.  Skin- No rash, multiple tattoos      Labs:   Recent Labs     04/30/21  0424   RBC 3.38*   HEMOGLOBIN 8.8*   HEMATOCRIT 27.9*   PLATELETCT 212     Lab Results   Component Value Date/Time    SODIUM 137 05/02/2021 03:04 AM    POTASSIUM 3.8 05/02/2021 03:04 AM    CHLORIDE 104 05/02/2021 03:04 AM    CO2 24 05/02/2021 03:04 AM    GLUCOSE 92 05/02/2021 03:04 AM    BUN 24 (H) 05/02/2021 03:04 AM    CREATININE 2.00 (H) 05/02/2021 03:04 AM        Assessment and Plan:  1.  70-year-old gentleman ECOG performance status of 0 with what appears in the record history of hypertension, possible chronic kidney disease with acute hydronephrosis status post     stent placement by urology on the left kidney on 4/28/2021     CT-guided retroperitoneal biopsy on 4/29/2021    Preliminary biopsy results show aggressive B cell lymphoma.:  FISH analysis pending    Had a long discussion with him today just about the possibilities.  I told him it's hard to know if this is a de rajan aggressive B-cell lymphoma versus a transformation from a low-grade lymphoma in the past.  I told him at this current time we would look at it more as a de rajan cancer.  He understands that he " does need additional staging but at this current time we want his kidney function to get better.  I told him we will need to do some additional testing including echocardiogram, PICC line, additional laboratories, as well as a bone marrow biopsy.  He understands that we need to stage his cancer.  He ultimately will need a PET scan if possible.  It would be nice if we can try to do this in the hospital because he most likely will need his first cycle of chemotherapy in the hospital.  If we cannot do this then we will will just have to do it at a later date but may have to do least do some CT scans chest abdomen pelvis when his kidney functions good enough.  We discussed that lymphomas can be treated and possibly cured as well.  I told his treatment plan will be dependent on some of the findings we get on the FISH panel.  I told him most likely it would be CHOP rituximab versus dose adjusted R-EPOCH depending on what we find and what we think he can tolerate.  His wife was not present during my consultation.  I told him I would try to call her later to update her as well.  Understands it my partner Dr. Gunn will be taking over the service on Monday and will follow through with plans that we discussed.    Recommendations 5/2/2021     --Echocardiogram of heart  --PICC line double-lumen  --Allopurinol 300 daily  --Consider nephrology consult if creatinine does not improve due to the fact that he could have risk for tumor lysis once treatment starts  --Moved to oncology   --Bone marrow biopsy (I will put the order in)  --PET/CT if possible    He agreed and verbalized his agreement and understanding with the current plan.  I answered all questions and concerns he has at this time.               Please note that this dictation was created using voice recognition software. I have made every reasonable attempt to correct obvious errors, but I expect that there are errors of grammar and possibly content that I did not  discover before finalizing the note.    I called wife x 2 left message on her mobile to call me back.  High complexity/complex decision making  SIGNATURES:  Nazario Barahona M.D.    CC:

## 2021-05-03 ENCOUNTER — APPOINTMENT (OUTPATIENT)
Dept: RADIOLOGY | Facility: MEDICAL CENTER | Age: 71
DRG: 824 | End: 2021-05-03
Attending: NURSE PRACTITIONER
Payer: MEDICARE

## 2021-05-03 PROBLEM — E87.6 HYPOKALEMIA: Status: ACTIVE | Noted: 2021-05-03

## 2021-05-03 LAB
ALBUMIN SERPL BCP-MCNC: 2.9 G/DL (ref 3.2–4.9)
BASOPHILS # BLD AUTO: 0.5 % (ref 0–1.8)
BASOPHILS # BLD AUTO: 0.7 % (ref 0–1.8)
BASOPHILS # BLD: 0.03 K/UL (ref 0–0.12)
BASOPHILS # BLD: 0.03 K/UL (ref 0–0.12)
BUN SERPL-MCNC: 21 MG/DL (ref 8–22)
CALCIUM SERPL-MCNC: 9.3 MG/DL (ref 8.5–10.5)
CHLORIDE SERPL-SCNC: 100 MMOL/L (ref 96–112)
CO2 SERPL-SCNC: 23 MMOL/L (ref 20–33)
CREAT SERPL-MCNC: 1.82 MG/DL (ref 0.5–1.4)
EOSINOPHIL # BLD AUTO: 0.16 K/UL (ref 0–0.51)
EOSINOPHIL # BLD AUTO: 0.18 K/UL (ref 0–0.51)
EOSINOPHIL NFR BLD: 2.9 % (ref 0–6.9)
EOSINOPHIL NFR BLD: 3.7 % (ref 0–6.9)
ERYTHROCYTE [DISTWIDTH] IN BLOOD BY AUTOMATED COUNT: 43.2 FL (ref 35.9–50)
ERYTHROCYTE [DISTWIDTH] IN BLOOD BY AUTOMATED COUNT: 43.8 FL (ref 35.9–50)
ERYTHROCYTE [DISTWIDTH] IN BLOOD BY AUTOMATED COUNT: 43.8 FL (ref 35.9–50)
GLUCOSE SERPL-MCNC: 80 MG/DL (ref 65–99)
HCT VFR BLD AUTO: 28.2 % (ref 42–52)
HCT VFR BLD AUTO: 28.2 % (ref 42–52)
HCT VFR BLD AUTO: 29 % (ref 42–52)
HGB BLD-MCNC: 9.1 G/DL (ref 14–18)
HGB BLD-MCNC: 9.1 G/DL (ref 14–18)
HGB BLD-MCNC: 9.2 G/DL (ref 14–18)
IMM GRANULOCYTES # BLD AUTO: 0.01 K/UL (ref 0–0.11)
IMM GRANULOCYTES # BLD AUTO: 0.02 K/UL (ref 0–0.11)
IMM GRANULOCYTES NFR BLD AUTO: 0.2 % (ref 0–0.9)
IMM GRANULOCYTES NFR BLD AUTO: 0.3 % (ref 0–0.9)
LYMPHOCYTES # BLD AUTO: 0.29 K/UL (ref 1–4.8)
LYMPHOCYTES # BLD AUTO: 0.37 K/UL (ref 1–4.8)
LYMPHOCYTES NFR BLD: 4.7 % (ref 22–41)
LYMPHOCYTES NFR BLD: 8.6 % (ref 22–41)
MCH RBC QN AUTO: 25.6 PG (ref 27–33)
MCH RBC QN AUTO: 25.9 PG (ref 27–33)
MCH RBC QN AUTO: 25.9 PG (ref 27–33)
MCHC RBC AUTO-ENTMCNC: 31.7 G/DL (ref 33.7–35.3)
MCHC RBC AUTO-ENTMCNC: 32.3 G/DL (ref 33.7–35.3)
MCHC RBC AUTO-ENTMCNC: 32.3 G/DL (ref 33.7–35.3)
MCV RBC AUTO: 80.1 FL (ref 81.4–97.8)
MCV RBC AUTO: 80.1 FL (ref 81.4–97.8)
MCV RBC AUTO: 80.6 FL (ref 81.4–97.8)
MONOCYTES # BLD AUTO: 0.55 K/UL (ref 0–0.85)
MONOCYTES # BLD AUTO: 0.77 K/UL (ref 0–0.85)
MONOCYTES NFR BLD AUTO: 12.5 % (ref 0–13.4)
MONOCYTES NFR BLD AUTO: 12.8 % (ref 0–13.4)
NEUTROPHILS # BLD AUTO: 3.19 K/UL (ref 1.82–7.42)
NEUTROPHILS # BLD AUTO: 4.87 K/UL (ref 1.82–7.42)
NEUTROPHILS NFR BLD: 74 % (ref 44–72)
NEUTROPHILS NFR BLD: 79.1 % (ref 44–72)
NRBC # BLD AUTO: 0 K/UL
NRBC # BLD AUTO: 0 K/UL
NRBC BLD-RTO: 0 /100 WBC
NRBC BLD-RTO: 0 /100 WBC
PATHOLOGY CONSULT NOTE: NORMAL
PHOSPHATE SERPL-MCNC: 2.5 MG/DL (ref 2.5–4.5)
PLATELET # BLD AUTO: 227 K/UL (ref 164–446)
PLATELET # BLD AUTO: 227 K/UL (ref 164–446)
PLATELET # BLD AUTO: 272 K/UL (ref 164–446)
PMV BLD AUTO: 10 FL (ref 9–12.9)
PMV BLD AUTO: 10 FL (ref 9–12.9)
PMV BLD AUTO: 10.4 FL (ref 9–12.9)
POTASSIUM SERPL-SCNC: 3.5 MMOL/L (ref 3.6–5.5)
RBC # BLD AUTO: 3.52 M/UL (ref 4.7–6.1)
RBC # BLD AUTO: 3.52 M/UL (ref 4.7–6.1)
RBC # BLD AUTO: 3.6 M/UL (ref 4.7–6.1)
SODIUM SERPL-SCNC: 130 MMOL/L (ref 135–145)
URATE SERPL-MCNC: 7.5 MG/DL (ref 2.5–8.3)
WBC # BLD AUTO: 4.2 K/UL (ref 4.8–10.8)
WBC # BLD AUTO: 4.2 K/UL (ref 4.8–10.8)
WBC # BLD AUTO: 6.2 K/UL (ref 4.8–10.8)

## 2021-05-03 PROCEDURE — 99232 SBSQ HOSP IP/OBS MODERATE 35: CPT | Performed by: NURSE PRACTITIONER

## 2021-05-03 PROCEDURE — B548ZZA ULTRASONOGRAPHY OF SUPERIOR VENA CAVA, GUIDANCE: ICD-10-PCS | Performed by: INTERNAL MEDICINE

## 2021-05-03 PROCEDURE — 38222 DX BONE MARROW BX & ASPIR: CPT | Performed by: INTERNAL MEDICINE

## 2021-05-03 PROCEDURE — 160048 HCHG OR STATISTICAL LEVEL 1-5: Performed by: INTERNAL MEDICINE

## 2021-05-03 PROCEDURE — 88305 TISSUE EXAM BY PATHOLOGIST: CPT | Mod: 59

## 2021-05-03 PROCEDURE — 700102 HCHG RX REV CODE 250 W/ 637 OVERRIDE(OP): Performed by: STUDENT IN AN ORGANIZED HEALTH CARE EDUCATION/TRAINING PROGRAM

## 2021-05-03 PROCEDURE — 160038 HCHG SURGERY MINUTES - EA ADDL 1 MIN LEVEL 2: Performed by: INTERNAL MEDICINE

## 2021-05-03 PROCEDURE — 80069 RENAL FUNCTION PANEL: CPT

## 2021-05-03 PROCEDURE — 07DR3ZX EXTRACTION OF ILIAC BONE MARROW, PERCUTANEOUS APPROACH, DIAGNOSTIC: ICD-10-PCS | Performed by: INTERNAL MEDICINE

## 2021-05-03 PROCEDURE — A9270 NON-COVERED ITEM OR SERVICE: HCPCS | Performed by: STUDENT IN AN ORGANIZED HEALTH CARE EDUCATION/TRAINING PROGRAM

## 2021-05-03 PROCEDURE — 36415 COLL VENOUS BLD VENIPUNCTURE: CPT

## 2021-05-03 PROCEDURE — 85025 COMPLETE CBC W/AUTO DIFF WBC: CPT

## 2021-05-03 PROCEDURE — 99153 MOD SED SAME PHYS/QHP EA: CPT | Performed by: INTERNAL MEDICINE

## 2021-05-03 PROCEDURE — A9270 NON-COVERED ITEM OR SERVICE: HCPCS | Performed by: INTERNAL MEDICINE

## 2021-05-03 PROCEDURE — 02HV33Z INSERTION OF INFUSION DEVICE INTO SUPERIOR VENA CAVA, PERCUTANEOUS APPROACH: ICD-10-PCS | Performed by: INTERNAL MEDICINE

## 2021-05-03 PROCEDURE — 160035 HCHG PACU - 1ST 60 MINS PHASE I: Performed by: INTERNAL MEDICINE

## 2021-05-03 PROCEDURE — 700111 HCHG RX REV CODE 636 W/ 250 OVERRIDE (IP): Performed by: INTERNAL MEDICINE

## 2021-05-03 PROCEDURE — 88341 IMHCHEM/IMCYTCHM EA ADD ANTB: CPT

## 2021-05-03 PROCEDURE — 160027 HCHG SURGERY MINUTES - 1ST 30 MINS LEVEL 2: Performed by: INTERNAL MEDICINE

## 2021-05-03 PROCEDURE — C1751 CATH, INF, PER/CENT/MIDLINE: HCPCS

## 2021-05-03 PROCEDURE — 88342 IMHCHEM/IMCYTCHM 1ST ANTB: CPT

## 2021-05-03 PROCEDURE — 84550 ASSAY OF BLOOD/URIC ACID: CPT

## 2021-05-03 PROCEDURE — 88311 DECALCIFY TISSUE: CPT

## 2021-05-03 PROCEDURE — 700111 HCHG RX REV CODE 636 W/ 250 OVERRIDE (IP)

## 2021-05-03 PROCEDURE — 700102 HCHG RX REV CODE 250 W/ 637 OVERRIDE(OP): Performed by: INTERNAL MEDICINE

## 2021-05-03 PROCEDURE — 99152 MOD SED SAME PHYS/QHP 5/>YRS: CPT | Performed by: INTERNAL MEDICINE

## 2021-05-03 PROCEDURE — 770004 HCHG ROOM/CARE - ONCOLOGY PRIVATE *

## 2021-05-03 PROCEDURE — 88313 SPECIAL STAINS GROUP 2: CPT | Mod: 91

## 2021-05-03 PROCEDURE — 700101 HCHG RX REV CODE 250: Performed by: NURSE PRACTITIONER

## 2021-05-03 PROCEDURE — 99152 MOD SED SAME PHYS/QHP 5/>YRS: CPT | Mod: 59 | Performed by: INTERNAL MEDICINE

## 2021-05-03 RX ORDER — ONDANSETRON 2 MG/ML
4 INJECTION INTRAMUSCULAR; INTRAVENOUS EVERY 4 HOURS PRN
Status: DISCONTINUED | OUTPATIENT
Start: 2021-05-03 | End: 2021-05-12 | Stop reason: HOSPADM

## 2021-05-03 RX ORDER — SODIUM CHLORIDE AND POTASSIUM CHLORIDE 150; 900 MG/100ML; MG/100ML
INJECTION, SOLUTION INTRAVENOUS CONTINUOUS
Status: DISCONTINUED | OUTPATIENT
Start: 2021-05-03 | End: 2021-05-03

## 2021-05-03 RX ORDER — MIDAZOLAM HYDROCHLORIDE 1 MG/ML
INJECTION INTRAMUSCULAR; INTRAVENOUS
Status: COMPLETED
Start: 2021-05-03 | End: 2021-05-03

## 2021-05-03 RX ORDER — SODIUM CHLORIDE 9 MG/ML
500 INJECTION, SOLUTION INTRAVENOUS
Status: DISCONTINUED | OUTPATIENT
Start: 2021-05-03 | End: 2021-05-03 | Stop reason: HOSPADM

## 2021-05-03 RX ORDER — AMLODIPINE BESYLATE 10 MG/1
10 TABLET ORAL
Status: DISCONTINUED | OUTPATIENT
Start: 2021-05-04 | End: 2021-05-12 | Stop reason: HOSPADM

## 2021-05-03 RX ORDER — SODIUM CHLORIDE AND POTASSIUM CHLORIDE 150; 900 MG/100ML; MG/100ML
INJECTION, SOLUTION INTRAVENOUS CONTINUOUS
Status: DISPENSED | OUTPATIENT
Start: 2021-05-03 | End: 2021-05-03

## 2021-05-03 RX ORDER — ONDANSETRON 4 MG/1
4 TABLET, ORALLY DISINTEGRATING ORAL EVERY 4 HOURS PRN
Status: DISCONTINUED | OUTPATIENT
Start: 2021-05-03 | End: 2021-05-12 | Stop reason: HOSPADM

## 2021-05-03 RX ORDER — MIDAZOLAM HYDROCHLORIDE 1 MG/ML
.5-2 INJECTION INTRAMUSCULAR; INTRAVENOUS PRN
Status: DISCONTINUED | OUTPATIENT
Start: 2021-05-03 | End: 2021-05-03 | Stop reason: HOSPADM

## 2021-05-03 RX ADMIN — MIDAZOLAM HYDROCHLORIDE 2 MG: 1 INJECTION INTRAMUSCULAR; INTRAVENOUS at 13:30

## 2021-05-03 RX ADMIN — MIDAZOLAM HYDROCHLORIDE 2 MG: 1 INJECTION, SOLUTION INTRAMUSCULAR; INTRAVENOUS at 13:30

## 2021-05-03 RX ADMIN — FENTANYL CITRATE 25 MCG: 50 INJECTION, SOLUTION INTRAMUSCULAR; INTRAVENOUS at 13:38

## 2021-05-03 RX ADMIN — DOCUSATE SODIUM 50 MG AND SENNOSIDES 8.6 MG 2 TABLET: 8.6; 5 TABLET, FILM COATED ORAL at 16:44

## 2021-05-03 RX ADMIN — ATENOLOL 50 MG: 50 TABLET ORAL at 09:53

## 2021-05-03 RX ADMIN — OXYCODONE 5 MG: 5 TABLET ORAL at 00:51

## 2021-05-03 RX ADMIN — FENTANYL CITRATE 50 MCG: 50 INJECTION, SOLUTION INTRAMUSCULAR; INTRAVENOUS at 13:30

## 2021-05-03 RX ADMIN — MIDAZOLAM HYDROCHLORIDE 1 MG: 1 INJECTION, SOLUTION INTRAMUSCULAR; INTRAVENOUS at 13:38

## 2021-05-03 RX ADMIN — OXYCODONE 5 MG: 5 TABLET ORAL at 19:32

## 2021-05-03 RX ADMIN — DOCUSATE SODIUM 50 MG AND SENNOSIDES 8.6 MG 2 TABLET: 8.6; 5 TABLET, FILM COATED ORAL at 05:33

## 2021-05-03 RX ADMIN — AMLODIPINE BESYLATE 5 MG: 10 TABLET ORAL at 05:33

## 2021-05-03 RX ADMIN — MIDAZOLAM HYDROCHLORIDE 1 MG: 1 INJECTION INTRAMUSCULAR; INTRAVENOUS at 13:38

## 2021-05-03 RX ADMIN — OXYCODONE 5 MG: 5 TABLET ORAL at 08:31

## 2021-05-03 RX ADMIN — POTASSIUM CHLORIDE AND SODIUM CHLORIDE: 900; 150 INJECTION, SOLUTION INTRAVENOUS at 08:32

## 2021-05-03 RX ADMIN — LORAZEPAM 1 MG: 1 TABLET ORAL at 19:32

## 2021-05-03 RX ADMIN — OMEPRAZOLE 20 MG: 20 CAPSULE, DELAYED RELEASE ORAL at 16:44

## 2021-05-03 ASSESSMENT — ENCOUNTER SYMPTOMS
BACK PAIN: 1
HEADACHES: 0
CHILLS: 0
DEPRESSION: 0
VOMITING: 0
DIZZINESS: 1
WEIGHT LOSS: 0
PALPITATIONS: 0
ABDOMINAL PAIN: 0
WEAKNESS: 0
FEVER: 0
BLOOD IN STOOL: 0
COUGH: 0
CONSTIPATION: 0
SHORTNESS OF BREATH: 0
FOCAL WEAKNESS: 0
MYALGIAS: 0
NAUSEA: 0
DIARRHEA: 0
NERVOUS/ANXIOUS: 1
WHEEZING: 0

## 2021-05-03 ASSESSMENT — PAIN DESCRIPTION - PAIN TYPE
TYPE: ACUTE PAIN
TYPE: ACUTE PAIN

## 2021-05-03 NOTE — PROCEDURES
Bone Marrow Biopsy/Aspiration    Date/Time: 5/3/2021 1:26 PM  Performed by: Armando Brambila M.D.  Authorized by: Armando Brambila M.D.     Consent:     Consent obtained:  Verbal    Consent given by:  Patient    Risks discussed:  Bleeding, infection, pain and repeat procedure    Alternatives discussed:  No treatment  Universal protocol:     Procedure explained and questions answered to patient or proxy's satisfaction: yes      Relevant documents present and verified: yes      Test results available and properly labeled: yes      Imaging studies available: yes      Required blood products, implants, devices, and special equipment available: yes      Immediately prior to procedure a time out was called: yes      Site/side marked: yes      Patient identity confirmed:  Verbally with patient  Pre-procedure details:     Procedure type:  Aspiration and biopsy    Requesting physician:  Dr Pond    Indications:  B Cell lymphoma     Position:  Prone    Buttock laterality:  Left    Local anesthetic:  1% Lidocaine    Subcutaneous volume:  1 mL    Periosteum anesthetic volume:  9 mL    Preparation: Patient was prepped and draped in usual sterile fashion    Sedation:     Patient Sedated: Yes      Sedation type: moderate (conscious) sedation      Sedation:  Midazolam    Analgesia:  Fentanyl    Sedation length:  30 minutes  Procedure details:     Aspirate obtained:  5 mL followed by 5 mL    Biopsy performed:  1 core    Number of attempts:  1  Post-procedure:     Puncture site:  Adhesive bandage applied and direct pressure applied    Patient tolerance of procedure:  Tolerated well, no immediate complications  Comments:      3 mg of versed and 75 mcg of fentanyl used for moderate sedation and analgesia

## 2021-05-03 NOTE — PROGRESS NOTES
Oncology/Hematology Progress Note               Author: Veronica Pond M.D. Date & Time created: 5/3/2021  12:53 PM     Interval History:  No acute events overnight. Pt still has back pain, but this is unchanged. He is going down for a BM biopsy later today. No fevers, cough, SOB.    Review of Systems:  Review of Systems   All other systems reviewed and are negative.      Physical Exam:  Physical Exam  Constitutional:       Appearance: Normal appearance. He is not ill-appearing.   HENT:      Head: Normocephalic and atraumatic.      Right Ear: External ear normal.      Left Ear: External ear normal.      Nose: Nose normal.      Mouth/Throat:      Pharynx: No oropharyngeal exudate.   Eyes:      General: No scleral icterus.     Conjunctiva/sclera: Conjunctivae normal.   Cardiovascular:      Rate and Rhythm: Normal rate.   Pulmonary:      Effort: Pulmonary effort is normal. No respiratory distress.   Abdominal:      General: Abdomen is flat. Bowel sounds are normal.      Palpations: Abdomen is soft.      Tenderness: There is no abdominal tenderness.   Musculoskeletal:         General: No swelling or tenderness.      Cervical back: Neck supple.   Skin:     General: Skin is warm and dry.   Neurological:      General: No focal deficit present.      Mental Status: He is alert and oriented to person, place, and time.   Psychiatric:         Mood and Affect: Mood normal.         Behavior: Behavior normal.         Thought Content: Thought content normal.         Judgment: Judgment normal.         Labs:          Recent Labs     05/01/21  0525 05/02/21  0304 05/03/21  0033   SODIUM 136 137 130*   POTASSIUM 3.7 3.8 3.5*   CHLORIDE 104 104 100   CO2 23 24 23   BUN 26* 24* 21   CREATININE 2.14* 2.00* 1.82*   PHOSPHORUS 2.2* 2.7 2.5   CALCIUM 9.9 9.5 9.3     Recent Labs     05/01/21  0525 05/02/21  0304 05/03/21  0033   GLUCOSE 79 92 80     Recent Labs     05/03/21  0033   RBC 3.52*   HEMOGLOBIN 9.1*   HEMATOCRIT 28.2*    PLATELETCT 227     Recent Labs     21  0033   WBC 4.2*     Recent Labs     21  0525 21  0304 21  0033   SODIUM 136 137 130*   POTASSIUM 3.7 3.8 3.5*   CHLORIDE 104 104 100   CO2 23 24 23   GLUCOSE 79 92 80   BUN 26* 24* 21   CREATININE 2.14* 2.00* 1.82*   CALCIUM 9.9 9.5 9.3     Hemodynamics:  Temp (24hrs), Av.1 °C (98.8 °F), Min:36.3 °C (97.4 °F), Max:37.6 °C (99.6 °F)  Temperature: 37 °C (98.6 °F)  Pulse  Av.6  Min: 61  Max: 88   Blood Pressure : (!) 164/73     Respiratory:    Respiration: 18, Pulse Oximetry: 94 %        RLL Breath Sounds: Diminished, LLL Breath Sounds: Diminished  Fluids:    Intake/Output Summary (Last 24 hours) at 5/3/2021 1253  Last data filed at 2021 1545  Gross per 24 hour   Intake 240 ml   Output 200 ml   Net 40 ml        GI/Nutrition:  Orders Placed This Encounter   Procedures   • Diet NPO     Standing Status:   Standing     Number of Occurrences:   8     Order Specific Question:   Restrict to:     Answer:   Sips with Medications [3]     Medical Decision Making, by Problem:  Active Hospital Problems    Diagnosis    • *Retroperitoneal mass [R19.00]    • Hypercalcemia [E83.52]    • Acute kidney injury superimposed on CKD (HCC) [N17.9, N18.9]    • Hydronephrosis [N13.30]    • Pleural effusion on left [J90]    • Hypertension [I10]        Assessment and Plan:  71 y/o otherwise healthy man presented with pain found to have a very large RP mass. This was biopsied on 21 and showed aggressive B-cell lymphoma, final path pending.    # Aggressive B-cell Lymphoma - large RP mass on imaging causing renal dysfunction, s/p stent. Reviewed the diagnosis in detail with the patient and his wife. Discussed need to complete staging with a Pet/CT scan and bone marrow biopsy. FISH results should be back in the next 1-2 days then can start first cycle of chemotherapy prior to discharge  - ECHO normal EF of 65%  - Follow up FISH results  - PET/CT scan  - Bone marrow  biopsy  - PICC line placement  - Continue allopurinol    # Acute Kidney Injury - 2/2 RP mass  - s/p stent placement, creatinine improving, monitor hopefully gets near normal prior to starting chemotherapy    # Continue to monitor CBC/CMP, uric acid, LDH, phos, mag on a daily basis    Will Continue to follow   High Complexity, High risk patient  Quality-Core Measures

## 2021-05-03 NOTE — PROGRESS NOTES
Palliative Care   Message left with volunteer services requesting pet therapy.     Virgen Egan A.P.R.N.  Palliative Care Nurse Practitioner  [phone]

## 2021-05-03 NOTE — CARE PLAN
Problem: Communication  Goal: The ability to communicate needs accurately and effectively will improve  Outcome: PROGRESSING AS EXPECTED     Problem: Venous Thromboembolism (VTW)/Deep Vein Thrombosis (DVT) Prevention:  Goal: Patient will participate in Venous Thrombosis (VTE)/Deep Vein Thrombosis (DVT)Prevention Measures  Outcome: PROGRESSING AS EXPECTED

## 2021-05-03 NOTE — PROGRESS NOTES
"Pt A&Ox4. VS: BP (!) 164/73   Pulse 73   Temp 37 °C (98.6 °F) (Temporal)   Resp 18   Ht 1.753 m (5' 9\")   Wt 88 kg (194 lb)   SpO2 94%   BMI 28.65 kg/m² . Pt denies numbness, tingling, SOB and chest pain. Pt reports pain 4/10 medicated per mar. Pt reported nausea discussed with Jessica RILEY she will place order. PIV patent with no blood return. Pt needs met at this time, call light within reach, hourly rounding in effect, and will continue to monitor.     "

## 2021-05-03 NOTE — CARE PLAN
Problem: Communication  Goal: The ability to communicate needs accurately and effectively will improve  Outcome: PROGRESSING AS EXPECTED  Note: Pt able to communicate needs effectively.      Problem: Safety  Goal: Will remain free from falls  Outcome: PROGRESSING AS EXPECTED  Note: Bed locked and in lowest position, hourly rounding in place, pt calls appropriately.

## 2021-05-03 NOTE — PROCEDURES
Vascular Access Team     Date of Insertion: 53/21  Arm Circumference: 30  Internal length: 45  External Length: 0  Vein Occupancy %: 28   Reason for PICC: Chemotherapy  Labs: WBC 4.2, , BUN 21, Cr 1.82, GFR 37, INR NA     Consents confirmed, vessel patency confirmed with ultrasound. Risks and benefits of procedure explained to patient and family member and education regarding central line associated bloodstream infections provided. Questions answered.      PICC placed in RUE per licensed provider order with ultrasound guidance.  5 Fr, Double lumen PICC placed in Brachial vein after 1 attempt(s). 2 mL of 1% lidocaine injected intradermally, 21 gauge microintroducer needle and modified Seldinger technique used. 45 cm catheter inserted with good blood return. Secured at 0 cm marker. Each lumen flushed without resistance with 10 mL 0.9% normal saline. PICC line secured with Biopatch and Tegaderm.     PICC tip placement location is confirmed by nurse to be in the Superior Vena Cava (SVC) utilizing 3CG technology. PICC line is appropriate for use at this time. Patient tolerated procedure well, without complications.  Patient condition relayed to unit RN or ordering physician via this post procedure note in the EMR.      Ultrasound images uploaded to PACS and viewable in the EMR - yes  Ultrasound imaged printed and placed in paper chart - no     [employer] Power PICC ref # [id number], Lot # [id number], Expiration Date 6/30/22

## 2021-05-03 NOTE — PROGRESS NOTES
"Hospital Medicine Daily Progress Note    Date of Service  5/3/2021    Chief Complaint  70 y.o. male admitted 4/27/2021 with weakness and back pain     Hospital Course  70 y.o. male with past medical history of hypertension on atenolol, CKD who presented 4/27/2021 as a transfer from [facility] for abnormal labs by PMD. Patient sates that he has been having back pain, low grade fevers, night sweats, and weight loss that started in November 2020. He characterizes his low back pain as migratory and dull, 6/10 in intensity, not relieved with anything exacerbated with movement. His PMD is Dr. Singh. On admission he had a calcium level of 14 and was given IVF and IV 4 mg zolendronic acid on admission. Repeat CT abdomen pelvis done without contrast due to patient's kidney function showed a large retroperitoneal mass surrounding the left kidney and obstructing the left ureter, mass also surrounds the aorta superior mesenteric artery and vein. Urology consulted for hydronephrosis.  S/p cystoscopy with ureteral stent insertion. IR biopsy done 4/29.  Pathology preliminary results aggressive B-cell lymphoma, oncology has been consulted.      Interval Problem Update  Patient lying in bed, easily aroused.  He is fully oriented.  He has some dizziness at rest and anxiety about his diagnosis and treatment.  His back pain has \"settled down.\"  He denies headache, pain other than his back, bowel and bladder and any other problems.  Nursing reports intermittent nausea.  -Creatinine improving from 2 down to 1.82  -Mild hypokalemia, NS with 20 of K+ given patient was n.p.o. for bone marrow biopsy this morning. Recheck in am.  -Zofran as needed  -PICC line ordered  -PET scan pending  -Bone marrow biopsy completed 5/3, results pending  -cardiac diet restarted  -Echocardiogram: 65% EF    Consultants/Specialty  Urology   IR  Oncology   Palliative Care     Code Status  Full Code    Disposition  Pending     Review of Systems  Review of " Systems   Constitutional: Positive for malaise/fatigue. Negative for chills, fever and weight loss.   Respiratory: Negative for cough, shortness of breath and wheezing.    Cardiovascular: Negative for chest pain, palpitations and leg swelling.   Gastrointestinal: Negative for abdominal pain, blood in stool, constipation, diarrhea, nausea and vomiting.   Genitourinary: Negative for dysuria, frequency, hematuria and urgency.   Musculoskeletal: Positive for back pain. Negative for myalgias.   Skin: Negative for rash.   Neurological: Positive for dizziness. Negative for focal weakness, weakness and headaches.   Psychiatric/Behavioral: Negative for depression. The patient is nervous/anxious.    All other systems reviewed and are negative.       Physical Exam  Temp:  [36.3 °C (97.4 °F)-37.6 °C (99.6 °F)] 37 °C (98.6 °F)  Pulse:  [65-77] 69  Resp:  [18] 18  BP: (144-186)/(60-92) 144/76  SpO2:  [94 %-100 %] 99 %    Physical Exam  Vitals and nursing note reviewed.   Constitutional:       General: He is not in acute distress.     Appearance: Normal appearance. He is overweight. He is ill-appearing.   HENT:      Head: Normocephalic and atraumatic.      Mouth/Throat:      Mouth: Mucous membranes are dry.   Eyes:      Conjunctiva/sclera: Conjunctivae normal.   Cardiovascular:      Rate and Rhythm: Normal rate and regular rhythm.      Pulses: Normal pulses.   Pulmonary:      Effort: Pulmonary effort is normal. No respiratory distress.      Breath sounds: Normal breath sounds. No wheezing.   Abdominal:      General: Abdomen is flat. There is no distension.      Palpations: Abdomen is soft.      Tenderness: There is no abdominal tenderness.   Musculoskeletal:         General: No swelling. Normal range of motion.      Cervical back: Normal range of motion and neck supple.   Skin:     General: Skin is warm and dry.   Neurological:      General: No focal deficit present.      Mental Status: He is alert, oriented to person, place, and  time and easily aroused. Mental status is at baseline.   Psychiatric:         Attention and Perception: Attention normal.         Mood and Affect: Mood normal.         Speech: Speech normal.         Behavior: Behavior normal. Behavior is cooperative.         Cognition and Memory: Cognition normal.         Fluids    Intake/Output Summary (Last 24 hours) at 5/3/2021 1433  Last data filed at 5/2/2021 1545  Gross per 24 hour   Intake --   Output 200 ml   Net -200 ml       Laboratory  Recent Labs     05/03/21  0033   WBC 4.2*   RBC 3.52*   HEMOGLOBIN 9.1*   HEMATOCRIT 28.2*   MCV 80.1*   MCH 25.9*   MCHC 32.3*   RDW 43.8   PLATELETCT 227   MPV 10.0     Recent Labs     05/01/21  0525 05/02/21  0304 05/03/21  0033   SODIUM 136 137 130*   POTASSIUM 3.7 3.8 3.5*   CHLORIDE 104 104 100   CO2 23 24 23   GLUCOSE 79 92 80   BUN 26* 24* 21   CREATININE 2.14* 2.00* 1.82*   CALCIUM 9.9 9.5 9.3          Lab Results   Component Value Date/Time    HEPAIGM Non-Reactive 05/01/2021 1210    HEPBSAG Non-Reactive 05/01/2021 1210    HEPBCORIGM Non-Reactive 05/01/2021 1210    HEPCAB Non-Reactive 05/01/2021 1210     Results from last 7 days   Lab Units 05/03/21  0033 05/02/21  0304 05/01/21  1210   URIC ACID 107 mg/dL 7.5 7.7 7.8     Results from last 7 days   Lab Units 05/01/21  1210   LDH TOTAL 115 U/L 157                 Imaging  EC-ECHOCARDIOGRAM COMPLETE W/O CONT   Final Result      CT-NEEDLE BX-ABD-RETROPERITONL   Final Result      1.  CT GUIDED RIGHT RETROPERITONEAL BIOPSY. MASSIVE RIGHT-SIDED PARA-AORTIC ADENOPATHY WAS TARGETED.      DX-CYSTO FLUORO > 1 HOUR   Final Result      Cysto fluoroscopy utilized for 36 seconds         INTERPRETING LOCATION: [facility] [facility] [place], [address]      CT-ABDOMEN-PELVIS W/O   Final Result      1.  Very large retroperitoneal mass surrounding LEFT kidney and obstructing LEFT ureter.  Mass also surrounds the aorta, superior mesenteric artery and vein.   2.  Nonobstructing RIGHT kidney stone.   3.  Minimal  ascites.   4.  Evaluation is limited due to lack of IV contrast.      OUTSIDE IMAGES-DX CHEST   Final Result      OUTSIDE IMAGES-CT CHEST/ABDOMEN/PELVIS   Final Result      NM-PETCT-WHOLE BODY    (Results Pending)   IR-PICC LINE PLACEMENT W/ GUIDANCE > AGE 5    (Results Pending)        Assessment/Plan  * Retroperitoneal mass  Assessment & Plan  Prelim pathology positive for aggressive B-cell lymphoma, pending final results   IR consulted, s/p biopsy   Oncology consulted  -echo: 65% EF  -bone marrow bx completed, results pending  -uric acid, LDH within normal limits, hepatitis, and HIV negative  -PET CT scan pending, due to CKD cannot get IV contrasted studies  -back pain management  -PICC line ordered    Acute kidney injury superimposed on CKD (HCC)  Assessment & Plan  Probably secondary to left sided hydronephrosis causing post obstruction failure  S/p ureteral stent   Monitor Cr  Avoid nephrotoxic agents   improving    Hypercalcemia  Assessment & Plan  Likely secondary to malignancy will need tissue diagnosis. Had a calcium level of 14 and was given IVF and did recieve IV 4 mg zolendronic acid on 4/27/2021 at 1607.   PTH low, vit D wnl  Trend Ca level  IVF stopped  Resolved    Hypokalemia  Assessment & Plan  NS with 20 of K for 1000 mL  Monitor    Hypertension  Assessment & Plan  Continue with atenolol and amlodipine     Pleural effusion on left  Assessment & Plan  Unclear suspect secondary to malignancy.   Small in size per CT L>R  Will monitor, no indication for thoracentesis at this time.    Hydronephrosis  Assessment & Plan  From mass causing obstruction.   Monitor Cr, improving  Urology consulted, s/p cystoscopy and ureteral stent placement 4/28        VTE prophylaxis: Lovenox     I have performed a physical exam and reviewed and updated ROS and Plan today (5/3/2021). In review of yesterday's note (5/2/2021), there are no changes except as documented above.     SHEREE Nolasco.

## 2021-05-04 PROBLEM — E87.6 HYPOKALEMIA: Status: RESOLVED | Noted: 2021-05-03 | Resolved: 2021-05-04

## 2021-05-04 PROBLEM — E83.52 HYPERCALCEMIA: Status: RESOLVED | Noted: 2021-04-28 | Resolved: 2021-05-04

## 2021-05-04 LAB
ALBUMIN SERPL BCP-MCNC: 2.9 G/DL (ref 3.2–4.9)
ALBUMIN/GLOB SERPL: 1.2 G/DL
ALP SERPL-CCNC: 71 U/L (ref 30–99)
ALT SERPL-CCNC: 7 U/L (ref 2–50)
ANION GAP SERPL CALC-SCNC: 11 MMOL/L (ref 7–16)
AST SERPL-CCNC: 19 U/L (ref 12–45)
BASOPHILS # BLD AUTO: 0.5 % (ref 0–1.8)
BASOPHILS # BLD: 0.02 K/UL (ref 0–0.12)
BILIRUB SERPL-MCNC: 0.2 MG/DL (ref 0.1–1.5)
BUN SERPL-MCNC: 19 MG/DL (ref 8–22)
CALCIUM SERPL-MCNC: 8.7 MG/DL (ref 8.5–10.5)
CHLORIDE SERPL-SCNC: 105 MMOL/L (ref 96–112)
CO2 SERPL-SCNC: 21 MMOL/L (ref 20–33)
CREAT SERPL-MCNC: 1.63 MG/DL (ref 0.5–1.4)
EOSINOPHIL # BLD AUTO: 0.12 K/UL (ref 0–0.51)
EOSINOPHIL NFR BLD: 2.7 % (ref 0–6.9)
ERYTHROCYTE [DISTWIDTH] IN BLOOD BY AUTOMATED COUNT: 43.8 FL (ref 35.9–50)
GLOBULIN SER CALC-MCNC: 2.5 G/DL (ref 1.9–3.5)
GLUCOSE SERPL-MCNC: 75 MG/DL (ref 65–99)
HCT VFR BLD AUTO: 27.8 % (ref 42–52)
HGB BLD-MCNC: 8.8 G/DL (ref 14–18)
IMM GRANULOCYTES # BLD AUTO: 0.01 K/UL (ref 0–0.11)
IMM GRANULOCYTES NFR BLD AUTO: 0.2 % (ref 0–0.9)
LYMPHOCYTES # BLD AUTO: 0.3 K/UL (ref 1–4.8)
LYMPHOCYTES NFR BLD: 6.8 % (ref 22–41)
MAGNESIUM SERPL-MCNC: 1.7 MG/DL (ref 1.5–2.5)
MCH RBC QN AUTO: 25.5 PG (ref 27–33)
MCHC RBC AUTO-ENTMCNC: 31.7 G/DL (ref 33.7–35.3)
MCV RBC AUTO: 80.6 FL (ref 81.4–97.8)
MONOCYTES # BLD AUTO: 0.61 K/UL (ref 0–0.85)
MONOCYTES NFR BLD AUTO: 13.7 % (ref 0–13.4)
NEUTROPHILS # BLD AUTO: 3.38 K/UL (ref 1.82–7.42)
NEUTROPHILS NFR BLD: 76.1 % (ref 44–72)
NRBC # BLD AUTO: 0 K/UL
NRBC BLD-RTO: 0 /100 WBC
PHOSPHATE SERPL-MCNC: 2.7 MG/DL (ref 2.5–4.5)
PLATELET # BLD AUTO: 207 K/UL (ref 164–446)
PMV BLD AUTO: 10.2 FL (ref 9–12.9)
POTASSIUM SERPL-SCNC: 4.1 MMOL/L (ref 3.6–5.5)
PROT SERPL-MCNC: 5.4 G/DL (ref 6–8.2)
RBC # BLD AUTO: 3.45 M/UL (ref 4.7–6.1)
SODIUM SERPL-SCNC: 137 MMOL/L (ref 135–145)
URATE SERPL-MCNC: 7.5 MG/DL (ref 2.5–8.3)
WBC # BLD AUTO: 4.4 K/UL (ref 4.8–10.8)

## 2021-05-04 PROCEDURE — 700102 HCHG RX REV CODE 250 W/ 637 OVERRIDE(OP): Performed by: INTERNAL MEDICINE

## 2021-05-04 PROCEDURE — 85025 COMPLETE CBC W/AUTO DIFF WBC: CPT

## 2021-05-04 PROCEDURE — 84550 ASSAY OF BLOOD/URIC ACID: CPT

## 2021-05-04 PROCEDURE — 770004 HCHG ROOM/CARE - ONCOLOGY PRIVATE *

## 2021-05-04 PROCEDURE — 84100 ASSAY OF PHOSPHORUS: CPT

## 2021-05-04 PROCEDURE — A9270 NON-COVERED ITEM OR SERVICE: HCPCS | Performed by: INTERNAL MEDICINE

## 2021-05-04 PROCEDURE — 83735 ASSAY OF MAGNESIUM: CPT

## 2021-05-04 PROCEDURE — A9270 NON-COVERED ITEM OR SERVICE: HCPCS | Performed by: STUDENT IN AN ORGANIZED HEALTH CARE EDUCATION/TRAINING PROGRAM

## 2021-05-04 PROCEDURE — 99232 SBSQ HOSP IP/OBS MODERATE 35: CPT | Performed by: NURSE PRACTITIONER

## 2021-05-04 PROCEDURE — 80053 COMPREHEN METABOLIC PANEL: CPT

## 2021-05-04 PROCEDURE — 700102 HCHG RX REV CODE 250 W/ 637 OVERRIDE(OP): Performed by: STUDENT IN AN ORGANIZED HEALTH CARE EDUCATION/TRAINING PROGRAM

## 2021-05-04 PROCEDURE — 700111 HCHG RX REV CODE 636 W/ 250 OVERRIDE (IP): Performed by: STUDENT IN AN ORGANIZED HEALTH CARE EDUCATION/TRAINING PROGRAM

## 2021-05-04 RX ADMIN — DOCUSATE SODIUM 50 MG AND SENNOSIDES 8.6 MG 2 TABLET: 8.6; 5 TABLET, FILM COATED ORAL at 17:17

## 2021-05-04 RX ADMIN — ENOXAPARIN SODIUM 40 MG: 40 INJECTION SUBCUTANEOUS at 06:10

## 2021-05-04 RX ADMIN — OXYCODONE 5 MG: 5 TABLET ORAL at 09:57

## 2021-05-04 RX ADMIN — LORAZEPAM 1 MG: 1 TABLET ORAL at 21:35

## 2021-05-04 RX ADMIN — ATENOLOL 50 MG: 50 TABLET ORAL at 09:53

## 2021-05-04 RX ADMIN — OMEPRAZOLE 20 MG: 20 CAPSULE, DELAYED RELEASE ORAL at 17:17

## 2021-05-04 RX ADMIN — AMLODIPINE BESYLATE 10 MG: 10 TABLET ORAL at 06:10

## 2021-05-04 RX ADMIN — OXYCODONE HYDROCHLORIDE 10 MG: 10 TABLET ORAL at 19:29

## 2021-05-04 ASSESSMENT — ENCOUNTER SYMPTOMS
CONSTIPATION: 0
SHORTNESS OF BREATH: 0
DIARRHEA: 0
FEVER: 0
NAUSEA: 0
DIZZINESS: 0
COUGH: 0
PALPITATIONS: 0
WEAKNESS: 0
WHEEZING: 0
BLOOD IN STOOL: 0
ABDOMINAL PAIN: 0
WEIGHT LOSS: 0
DEPRESSION: 0
NERVOUS/ANXIOUS: 0
MYALGIAS: 0
CHILLS: 0
FOCAL WEAKNESS: 0
VOMITING: 0
HEADACHES: 0
BACK PAIN: 1

## 2021-05-04 ASSESSMENT — PAIN DESCRIPTION - PAIN TYPE
TYPE: ACUTE PAIN

## 2021-05-04 NOTE — PROGRESS NOTES
No changes from epic. AOx4 VSS. Up self, steady. Medicating per MAR for pain. PICC SL. Awaiting final path report and FISH panel for pt to start 1st round of treatment. Possible PET inpatient, will check with case management if they are aware. Call light and belongings within reach, able to make needs known, rounding in place, will monitor.

## 2021-05-04 NOTE — DISCHARGE PLANNING
Anticipated Discharge Disposition: Unknown needs at this time- pending medical collaboration    Action: Pt was discussed in IDT rounds. Pt requires a PET scan [place] pt will require IP PET. LSW sent message to [facility] leadership for approval.     Barriers to Discharge: Medical clearance    Plan: LSW to follow up when [facility] gives approval to schedule       ADDENDUM @ 1430  PET approved by [facility] leadership, Jenise BENTLEY. LSW scheduled PET @ imagining office, spoke with Sarah. Scheduled for 5/6 @ 1230, 1200 arrival time.    LSW notified bedside RN, and reminded of dietary consult.     LSW completed approved service form, cost of [employer] transport round trip to [address] [place] [address] is $1480. LSW emailed approved service form to Jenise Sterling @ 1500. Jenise Sterling approved service @ 1510.     Transport forms created and sent to [facility] to complete transportation. Voalte sent to Stella Thao, fax confirmed.     Plan: LSW to assist as needed

## 2021-05-04 NOTE — CARE PLAN
Problem: Discharge Barriers/Planning  Goal: Patient's continuum of care needs will be met  Outcome: PROGRESSING AS EXPECTED  Note:   Awaiting final path report and FISH panel for pt to start 1st round of treatment.   Possible PET inpatient.      Problem: Pain Management  Goal: Pain level will decrease to patient's comfort goal  Outcome: PROGRESSING AS EXPECTED  Note:   Medicating per MAR for pain. Oxy PRN

## 2021-05-04 NOTE — PROGRESS NOTES
[facility] Medicine Daily Progress Note    Date of Service  5/4/2021    Chief Complaint  70 y.o. male admitted 4/27/2021 with weakness and back pain     Hospital Course  70 y.o. male with past medical history of hypertension on atenolol, CKD who presented 4/27/2021 as a transfer from [facility] for abnormal labs by PMD. Patient sates that he has been having back pain, low grade fevers, night sweats, and weight loss that started in November 2020. He characterizes his low back pain as migratory and dull, 6/10 in intensity, not relieved with anything exacerbated with movement. His PMD is Dr. Singh. On admission he had a calcium level of 14 and was given IVF and IV 4 mg zolendronic acid on admission. Repeat CT abdomen pelvis done without contrast due to patient's kidney function showed a large retroperitoneal mass surrounding the left kidney and obstructing the left ureter, mass also surrounds the aorta superior mesenteric artery and vein. Urology consulted for hydronephrosis.  S/p cystoscopy with ureteral stent insertion. IR biopsy done 4/29.  Pathology preliminary results aggressive B-cell lymphoma, oncology has been consulted.      Interval Problem Update  Pain well controlled. H/H stable. Creatinine continuing to trend down, now 1.63 from 1.82. Discussed with oncology, and will need PET scan before proceeding with chemotherapy.     Consultants/Specialty  Urology   IR  Oncology   Palliative Care     Code Status  Full Code    Disposition  Continue inpatient for chemotherapy     Review of Systems  Review of Systems   Constitutional: Positive for malaise/fatigue. Negative for chills, fever and weight loss.   Respiratory: Negative for cough, shortness of breath and wheezing.    Cardiovascular: Negative for chest pain, palpitations and leg swelling.   Gastrointestinal: Negative for abdominal pain, blood in stool, constipation, diarrhea, nausea and vomiting.   Genitourinary: Negative for dysuria, frequency, hematuria  and urgency.   Musculoskeletal: Positive for back pain. Negative for myalgias.   Skin: Negative for rash.   Neurological: Negative for dizziness, focal weakness, weakness and headaches.   Psychiatric/Behavioral: Negative for depression. The patient is not nervous/anxious.    All other systems reviewed and are negative.       Physical Exam  Temp:  [36.5 °C (97.7 °F)-37.7 °C (99.8 °F)] 36.5 °C (97.7 °F)  Pulse:  [70-83] 73  Resp:  [16-18] 16  BP: (126-156)/(73-85) 126/73  SpO2:  [90 %-96 %] 96 %    Physical Exam  Vitals and nursing note reviewed.   Constitutional:       General: He is not in acute distress.     Appearance: He is overweight. He is ill-appearing.   HENT:      Head: Normocephalic and atraumatic.      Right Ear: External ear normal.      Left Ear: External ear normal.      Mouth/Throat:      Mouth: Mucous membranes are moist.      Pharynx: Oropharynx is clear.   Eyes:      Conjunctiva/sclera: Conjunctivae normal.   Cardiovascular:      Rate and Rhythm: Normal rate and regular rhythm.      Pulses: Normal pulses.   Pulmonary:      Effort: Pulmonary effort is normal. No respiratory distress.      Breath sounds: Normal breath sounds. No wheezing.   Abdominal:      General: Abdomen is flat. There is no distension.      Palpations: Abdomen is soft.      Tenderness: There is no abdominal tenderness.   Musculoskeletal:         General: No swelling. Normal range of motion.      Cervical back: Normal range of motion and neck supple. No rigidity.   Skin:     General: Skin is warm and dry.      Coloration: Skin is pale.   Neurological:      General: No focal deficit present.      Mental Status: He is alert, oriented to person, place, and time and easily aroused. Mental status is at baseline.   Psychiatric:         Attention and Perception: Attention normal.         Mood and Affect: Mood normal.         Speech: Speech normal.         Behavior: Behavior normal. Behavior is cooperative.         Cognition and Memory:  Cognition normal.         Fluids  No intake or output data in the 24 hours ending 05/04/21 1409    Laboratory  Recent Labs     05/03/21  0033 05/03/21  1930 05/04/21  0317   WBC 4.2*  4.2* 6.2 4.4*   RBC 3.52*  3.52* 3.60* 3.45*   HEMOGLOBIN 9.1*  9.1* 9.2* 8.8*   HEMATOCRIT 28.2*  28.2* 29.0* 27.8*   MCV 80.1*  80.1* 80.6* 80.6*   MCH 25.9*  25.9* 25.6* 25.5*   MCHC 32.3*  32.3* 31.7* 31.7*   RDW 43.8  43.8 43.2 43.8   PLATELETCT 227  227 272 207   MPV 10.0  10.0 10.4 10.2     Recent Labs     05/02/21  0304 05/03/21  0033 05/04/21  0317   SODIUM 137 130* 137   POTASSIUM 3.8 3.5* 4.1   CHLORIDE 104 100 105   CO2 24 23 21   GLUCOSE 92 80 75   BUN 24* 21 19   CREATININE 2.00* 1.82* 1.63*   CALCIUM 9.5 9.3 8.7          Lab Results   Component Value Date/Time    HEPAIGM Non-Reactive 05/01/2021 1210    HEPBSAG Non-Reactive 05/01/2021 1210    HEPBCORIGM Non-Reactive 05/01/2021 1210    HEPCAB Non-Reactive 05/01/2021 1210     Results from last 7 days   Lab Units 05/04/21  0317 05/03/21  0033 05/02/21  0304 05/01/21  1210   URIC ACID 107 mg/dL 7.5 7.5 7.7 7.8     Results from last 7 days   Lab Units 05/01/21  1210   LDH TOTAL 115 U/L 157                 Imaging  IR-PICC LINE PLACEMENT W/ GUIDANCE > AGE 5   Final Result                  Ultrasound-guided PICC placement performed by qualified nursing staff as    above.          EC-ECHOCARDIOGRAM COMPLETE W/O CONT   Final Result      CT-NEEDLE BX-ABD-RETROPERITONL   Final Result      1.  CT GUIDED RIGHT RETROPERITONEAL BIOPSY. MASSIVE RIGHT-SIDED PARA-AORTIC ADENOPATHY WAS TARGETED.      DX-CYSTO FLUORO > 1 HOUR   Final Result      Cysto fluoroscopy utilized for 36 seconds         INTERPRETING LOCATION: [address], [place], [address]      CT-ABDOMEN-PELVIS W/O   Final Result      1.  Very large retroperitoneal mass surrounding LEFT kidney and obstructing LEFT ureter.  Mass also surrounds the aorta, superior mesenteric artery and vein.   2.  Nonobstructing RIGHT kidney  stone.   3.  Minimal ascites.   4.  Evaluation is limited due to lack of IV contrast.      OUTSIDE IMAGES-DX CHEST   Final Result      OUTSIDE IMAGES-CT CHEST/ABDOMEN/PELVIS   Final Result      NM-PETCT-WHOLE BODY    (Results Pending)        Assessment/Plan  * Retroperitoneal mass  Assessment & Plan  Prelim pathology positive for aggressive B-cell lymphoma, pending final results   s/p biopsy   Oncology is following   -echo: 65% EF  -bone marrow bx completed, results pending  -uric acid, LDH within normal limits, hepatitis, and HIV negative  -PET CT scan pending, due to CKD cannot get IV contrasted studies  -back pain management  -PICC line ordered    Acute kidney injury superimposed on CKD (HCC)  Assessment & Plan  Probably secondary to left sided hydronephrosis causing post obstruction failure  S/p ureteral stent   Monitor Cr  Avoid nephrotoxic agents   improving    Hypertension  Assessment & Plan  Continue with atenolol and amlodipine     Pleural effusion on left  Assessment & Plan  Unclear suspect secondary to malignancy.   Small in size per CT L>R  Will monitor, no indication for thoracentesis at this time.    Hydronephrosis  Assessment & Plan  From mass causing obstruction.  s/p cystoscopy and ureteral stent placement 4/28   Monitor Cr, improving         VTE prophylaxis: Lovenox     I have performed a physical exam and reviewed and updated ROS and Plan today (5/4/2021). In review of yesterday's note (5/3/2021), there are no changes except as documented above.     SHEREE Coles.

## 2021-05-04 NOTE — CARE PLAN
Problem: Knowledge Deficit  Goal: Knowledge of disease process/condition, treatment plan, diagnostic tests, and medications will improve  Outcome: PROGRESSING AS EXPECTED  Note: Pt updated on POC, all questions answered at this time.      Problem: Pain Management  Goal: Pain level will decrease to patient's comfort goal  Outcome: PROGRESSING AS EXPECTED  Note: Pain controlled with PRN pain medications.

## 2021-05-04 NOTE — PROGRESS NOTES
Oncology/Hematology Progress Note               Author: Veronica Pond M.D. Date & Time created: 5/4/2021  1:39 PM     Interval History:  No acute events overnight. He is doing about the same. Back pain is stable. He feels fatigued. He is awaiting additional testing and wants to start treatment ASAP.    Review of Systems:  Review of Systems   All other systems reviewed and are negative.      Physical Exam:  Physical Exam  Constitutional:       Appearance: Normal appearance. He is not ill-appearing.   HENT:      Head: Normocephalic and atraumatic.      Right Ear: External ear normal.      Left Ear: External ear normal.      Nose: Nose normal.      Mouth/Throat:      Pharynx: No oropharyngeal exudate.   Eyes:      General: No scleral icterus.     Conjunctiva/sclera: Conjunctivae normal.   Cardiovascular:      Rate and Rhythm: Normal rate.   Pulmonary:      Effort: Pulmonary effort is normal. No respiratory distress.   Abdominal:      General: Abdomen is flat. Bowel sounds are normal.      Palpations: Abdomen is soft.      Tenderness: There is no abdominal tenderness.   Musculoskeletal:         General: No swelling or tenderness.      Cervical back: Neck supple.   Skin:     General: Skin is warm and dry.   Neurological:      General: No focal deficit present.      Mental Status: He is alert and oriented to person, place, and time.   Psychiatric:         Mood and Affect: Mood normal.         Behavior: Behavior normal.         Thought Content: Thought content normal.         Judgment: Judgment normal.         Labs:          Recent Labs     05/02/21  0304 05/03/21  0033 05/04/21 0317   SODIUM 137 130* 137   POTASSIUM 3.8 3.5* 4.1   CHLORIDE 104 100 105   CO2 24 23 21   BUN 24* 21 19   CREATININE 2.00* 1.82* 1.63*   MAGNESIUM  --   --  1.7   PHOSPHORUS 2.7 2.5 2.7   CALCIUM 9.5 9.3 8.7     Recent Labs     05/02/21  0304 05/03/21  0033 05/04/21  0317   ALTSGPT  --   --  7   ASTSGOT  --   --  19   ALKPHOSPHAT  --   --   71   TBILIRUBIN  --   --  0.2   GLUCOSE 92 80 75     Recent Labs     21   RBC 3.52*  3.52* 3.60* 3.45*   HEMOGLOBIN 9.1*  9.1* 9.2* 8.8*   HEMATOCRIT 28.2*  28.2* 29.0* 27.8*   PLATELETCT 227  227 272 207     Recent Labs     21   WBC 4.2*  4.2* 6.2 4.4*   NEUTSPOLYS 74.00* 79.10* 76.10*   LYMPHOCYTES 8.60* 4.70* 6.80*   MONOCYTES 12.80 12.50 13.70*   EOSINOPHILS 3.70 2.90 2.70   BASOPHILS 0.70 0.50 0.50   ASTSGOT  --   --  19   ALTSGPT  --   --  7   ALKPHOSPHAT  --   --  71   TBILIRUBIN  --   --  0.2     Recent Labs     21   SODIUM 137 130* 137   POTASSIUM 3.8 3.5* 4.1   CHLORIDE 104 100 105   CO2 24 23 21   GLUCOSE 92 80 75   BUN 24* 21 19   CREATININE 2.00* 1.82* 1.63*   CALCIUM 9.5 9.3 8.7     Hemodynamics:  Temp (24hrs), Av.9 °C (98.5 °F), Min:36.5 °C (97.7 °F), Max:37.7 °C (99.8 °F)  Temperature: 36.5 °C (97.7 °F)  Pulse  Av.4  Min: 61  Max: 88   Blood Pressure : 126/73     Respiratory:    Respiration: 16, Pulse Oximetry: 96 %        RLL Breath Sounds: Diminished, LLL Breath Sounds: Diminished  Fluids:    Intake/Output Summary (Last 24 hours) at 5/3/2021 1253  Last data filed at 2021 1545  Gross per 24 hour   Intake 240 ml   Output 200 ml   Net 40 ml        GI/Nutrition:  Orders Placed This Encounter   Procedures   • Diet Order Diet: Regular     Standing Status:   Standing     Number of Occurrences:   1     Order Specific Question:   Diet:     Answer:   Regular [1]     Medical Decision Making, by Problem:  Active Hospital Problems    Diagnosis    • *Retroperitoneal mass [R19.00]    • Hypercalcemia [E83.52]    • Acute kidney injury superimposed on CKD (HCC) [N17.9, N18.9]    • Hydronephrosis [N13.30]    • Pleural effusion on left [J90]    • Hypertension [I10]        Assessment and Plan:  69 y/o otherwise healthy man presented with pain found to have a very large RP mass.  This was biopsied on 4/29/21 and showed aggressive B-cell lymphoma, final path pending.    # Aggressive B-cell Lymphoma - large RP mass on imaging causing renal dysfunction, s/p stent. Reviewed the diagnosis in detail with the patient and his wife. Discussed need to complete staging with a PET/CT scan and bone marrow biopsy. FISH results pending. Likely will start R-EPOCH once pet scan completed.  - ECHO normal EF of 65%  - Follow up FISH results  - PET/CT scan - pending  - Bone marrow biopsy - completed on 5/3/21  - PICC line placement - completed  - Continue allopurinol    # Acute Kidney Injury - 2/2 RP mass  - s/p stent placement, creatinine improving, monitor hopefully gets near normal prior to starting chemotherapy, this continues to improve    # Anemia, microcytic - Hemoglobin stable around 9  - will get iron panel, b12, folate, ferritin unlikely to be reliable    # Continue to monitor CBC/CMP, uric acid, LDH, phos, mag on a daily basis    Will Continue to follow   High Complexity, High risk patient  Quality-Core Measures

## 2021-05-05 ENCOUNTER — APPOINTMENT (OUTPATIENT)
Dept: RADIOLOGY | Facility: MEDICAL CENTER | Age: 71
DRG: 824 | End: 2021-05-05
Attending: INTERNAL MEDICINE
Payer: MEDICARE

## 2021-05-05 LAB
ALBUMIN SERPL BCP-MCNC: 2.7 G/DL (ref 3.2–4.9)
ALBUMIN/GLOB SERPL: 1 G/DL
ALP SERPL-CCNC: 69 U/L (ref 30–99)
ALT SERPL-CCNC: 6 U/L (ref 2–50)
ANION GAP SERPL CALC-SCNC: 6 MMOL/L (ref 7–16)
AST SERPL-CCNC: 16 U/L (ref 12–45)
BASOPHILS # BLD AUTO: 0.5 % (ref 0–1.8)
BASOPHILS # BLD: 0.02 K/UL (ref 0–0.12)
BILIRUB SERPL-MCNC: 0.2 MG/DL (ref 0.1–1.5)
BUN SERPL-MCNC: 19 MG/DL (ref 8–22)
CALCIUM SERPL-MCNC: 8.8 MG/DL (ref 8.5–10.5)
CHLORIDE SERPL-SCNC: 106 MMOL/L (ref 96–112)
CO2 SERPL-SCNC: 23 MMOL/L (ref 20–33)
CREAT SERPL-MCNC: 1.52 MG/DL (ref 0.5–1.4)
EOSINOPHIL # BLD AUTO: 0.17 K/UL (ref 0–0.51)
EOSINOPHIL NFR BLD: 4.2 % (ref 0–6.9)
ERYTHROCYTE [DISTWIDTH] IN BLOOD BY AUTOMATED COUNT: 43.8 FL (ref 35.9–50)
GLOBULIN SER CALC-MCNC: 2.6 G/DL (ref 1.9–3.5)
GLUCOSE SERPL-MCNC: 86 MG/DL (ref 65–99)
HCT VFR BLD AUTO: 27.4 % (ref 42–52)
HGB BLD-MCNC: 8.5 G/DL (ref 14–18)
IMM GRANULOCYTES # BLD AUTO: 0.02 K/UL (ref 0–0.11)
IMM GRANULOCYTES NFR BLD AUTO: 0.5 % (ref 0–0.9)
LYMPHOCYTES # BLD AUTO: 0.31 K/UL (ref 1–4.8)
LYMPHOCYTES NFR BLD: 7.6 % (ref 22–41)
MCH RBC QN AUTO: 25.1 PG (ref 27–33)
MCHC RBC AUTO-ENTMCNC: 31 G/DL (ref 33.7–35.3)
MCV RBC AUTO: 80.8 FL (ref 81.4–97.8)
MONOCYTES # BLD AUTO: 0.58 K/UL (ref 0–0.85)
MONOCYTES NFR BLD AUTO: 14.2 % (ref 0–13.4)
NEUTROPHILS # BLD AUTO: 2.98 K/UL (ref 1.82–7.42)
NEUTROPHILS NFR BLD: 73 % (ref 44–72)
NRBC # BLD AUTO: 0 K/UL
NRBC BLD-RTO: 0 /100 WBC
PLATELET # BLD AUTO: 230 K/UL (ref 164–446)
PMV BLD AUTO: 10.5 FL (ref 9–12.9)
POTASSIUM SERPL-SCNC: 3.9 MMOL/L (ref 3.6–5.5)
PROT SERPL-MCNC: 5.3 G/DL (ref 6–8.2)
RBC # BLD AUTO: 3.39 M/UL (ref 4.7–6.1)
SODIUM SERPL-SCNC: 135 MMOL/L (ref 135–145)
URATE SERPL-MCNC: 7.4 MG/DL (ref 2.5–8.3)
WBC # BLD AUTO: 4.1 K/UL (ref 4.8–10.8)

## 2021-05-05 PROCEDURE — 700111 HCHG RX REV CODE 636 W/ 250 OVERRIDE (IP): Performed by: STUDENT IN AN ORGANIZED HEALTH CARE EDUCATION/TRAINING PROGRAM

## 2021-05-05 PROCEDURE — A9270 NON-COVERED ITEM OR SERVICE: HCPCS | Performed by: STUDENT IN AN ORGANIZED HEALTH CARE EDUCATION/TRAINING PROGRAM

## 2021-05-05 PROCEDURE — 99232 SBSQ HOSP IP/OBS MODERATE 35: CPT | Performed by: NURSE PRACTITIONER

## 2021-05-05 PROCEDURE — 70490 CT SOFT TISSUE NECK W/O DYE: CPT

## 2021-05-05 PROCEDURE — 84550 ASSAY OF BLOOD/URIC ACID: CPT

## 2021-05-05 PROCEDURE — A9270 NON-COVERED ITEM OR SERVICE: HCPCS | Performed by: INTERNAL MEDICINE

## 2021-05-05 PROCEDURE — 85025 COMPLETE CBC W/AUTO DIFF WBC: CPT

## 2021-05-05 PROCEDURE — 700102 HCHG RX REV CODE 250 W/ 637 OVERRIDE(OP): Performed by: INTERNAL MEDICINE

## 2021-05-05 PROCEDURE — 770004 HCHG ROOM/CARE - ONCOLOGY PRIVATE *

## 2021-05-05 PROCEDURE — 700102 HCHG RX REV CODE 250 W/ 637 OVERRIDE(OP): Performed by: STUDENT IN AN ORGANIZED HEALTH CARE EDUCATION/TRAINING PROGRAM

## 2021-05-05 PROCEDURE — 80053 COMPREHEN METABOLIC PANEL: CPT

## 2021-05-05 PROCEDURE — 71250 CT THORAX DX C-: CPT

## 2021-05-05 RX ADMIN — AMLODIPINE BESYLATE 10 MG: 10 TABLET ORAL at 05:09

## 2021-05-05 RX ADMIN — ENOXAPARIN SODIUM 40 MG: 40 INJECTION SUBCUTANEOUS at 05:09

## 2021-05-05 RX ADMIN — LORAZEPAM 1 MG: 1 TABLET ORAL at 11:22

## 2021-05-05 RX ADMIN — DOCUSATE SODIUM 50 MG AND SENNOSIDES 8.6 MG 2 TABLET: 8.6; 5 TABLET, FILM COATED ORAL at 17:04

## 2021-05-05 RX ADMIN — LORAZEPAM 1 MG: 1 TABLET ORAL at 20:40

## 2021-05-05 RX ADMIN — OXYCODONE 5 MG: 5 TABLET ORAL at 05:09

## 2021-05-05 RX ADMIN — ATENOLOL 50 MG: 50 TABLET ORAL at 08:55

## 2021-05-05 RX ADMIN — DOCUSATE SODIUM 50 MG AND SENNOSIDES 8.6 MG 2 TABLET: 8.6; 5 TABLET, FILM COATED ORAL at 05:09

## 2021-05-05 RX ADMIN — OMEPRAZOLE 20 MG: 20 CAPSULE, DELAYED RELEASE ORAL at 17:04

## 2021-05-05 ASSESSMENT — PAIN DESCRIPTION - PAIN TYPE
TYPE: ACUTE PAIN

## 2021-05-05 ASSESSMENT — ENCOUNTER SYMPTOMS
INSOMNIA: 1
ABDOMINAL PAIN: 0
DEPRESSION: 0
NAUSEA: 0
MYALGIAS: 0
FEVER: 0
CHILLS: 0
WHEEZING: 0
FOCAL WEAKNESS: 0
CONSTIPATION: 0
BACK PAIN: 0
NERVOUS/ANXIOUS: 1
PALPITATIONS: 0
WEIGHT LOSS: 0
HEADACHES: 0
VOMITING: 0
DIARRHEA: 0
DIZZINESS: 0
BLOOD IN STOOL: 0
SHORTNESS OF BREATH: 0
WEAKNESS: 0
COUGH: 0

## 2021-05-05 NOTE — DISCHARGE PLANNING
Anticipated Discharge Disposition: TBD, most likely home to his prior living arrangement.     Action: Patient was discussed at IDT rounds today, per team patient declines to have inpatient PET scan on 5/6/2021. Patient had a panic attack and stated he cannot do it, he did agree to CT scan today for staging of his cancer.     This RN,  notified Case Management leadership that [facility] will be cancelled for PET scan on 5/6/2021. This RN,  notified [facility] via [employer], (Stella Thao) that transportation can be cancelled.     Barriers to Discharge: Pending medical clearance, CT scan, labs    Plan:  available for all discharge planning needs.     Lara Corona RN,

## 2021-05-05 NOTE — PROGRESS NOTES
[facility] Medicine Daily Progress Note    Date of Service  5/5/2021    Chief Complaint  70 y.o. male admitted 4/27/2021 with weakness and back pain     Hospital Course  70 y.o. male with past medical history of hypertension on atenolol, CKD who presented 4/27/2021 as a transfer from [facility] for abnormal labs by PMD. Patient sates that he has been having back pain, low grade fevers, night sweats, and weight loss that started in November 2020. He characterizes his low back pain as migratory and dull, 6/10 in intensity, not relieved with anything exacerbated with movement. His PMD is Dr. Singh. On admission he had a calcium level of 14 and was given IVF and IV 4 mg zolendronic acid on admission. Repeat CT abdomen pelvis done without contrast due to patient's kidney function showed a large retroperitoneal mass surrounding the left kidney and obstructing the left ureter, mass also surrounds the aorta superior mesenteric artery and vein. Urology consulted for hydronephrosis.  S/p cystoscopy with ureteral stent insertion. IR biopsy done 4/29.  Pathology preliminary results aggressive B-cell lymphoma, oncology has been consulted.      Interval Problem Update  Creatinine continuing to improve, now 1.52 from 1.63. He is refusing PET scan even under sedation.This was discussed with oncologist and will proceed with CT chest and neck without contrast instead.     Consultants/Specialty  Urology   IR  Oncology   Palliative Care     Code Status  Full Code    Disposition  Continue inpatient for chemotherapy     Review of Systems  Review of Systems   Constitutional: Positive for malaise/fatigue. Negative for chills, fever and weight loss.   Respiratory: Negative for cough, shortness of breath and wheezing.    Cardiovascular: Negative for chest pain, palpitations and leg swelling.   Gastrointestinal: Negative for abdominal pain, blood in stool, constipation, diarrhea, nausea and vomiting.   Genitourinary: Negative for dysuria,  frequency, hematuria and urgency.   Musculoskeletal: Negative for back pain and myalgias.   Skin: Negative for rash.   Neurological: Negative for dizziness, focal weakness, weakness and headaches.   Psychiatric/Behavioral: Negative for depression. The patient is nervous/anxious and has insomnia.    All other systems reviewed and are negative.       Physical Exam  Temp:  [37.2 °C (98.9 °F)-37.4 °C (99.4 °F)] 37.4 °C (99.3 °F)  Pulse:  [67-74] 67  Resp:  [16-18] 17  BP: (130-158)/(64-81) 130/65  SpO2:  [95 %-99 %] 95 %    Physical Exam  Vitals and nursing note reviewed.   Constitutional:       General: He is not in acute distress.     Appearance: He is overweight. He is ill-appearing.   HENT:      Head: Normocephalic and atraumatic.      Right Ear: External ear normal.      Left Ear: External ear normal.      Mouth/Throat:      Mouth: Mucous membranes are moist.      Pharynx: Oropharynx is clear.   Eyes:      Conjunctiva/sclera: Conjunctivae normal.   Cardiovascular:      Rate and Rhythm: Normal rate and regular rhythm.      Pulses: Normal pulses.   Pulmonary:      Effort: Pulmonary effort is normal. No respiratory distress.      Breath sounds: Normal breath sounds. No wheezing.   Abdominal:      General: Abdomen is flat. There is no distension.      Palpations: Abdomen is soft.      Tenderness: There is no abdominal tenderness.   Musculoskeletal:         General: No swelling. Normal range of motion.      Cervical back: Normal range of motion and neck supple. No rigidity.   Skin:     General: Skin is warm and dry.      Coloration: Skin is pale.   Neurological:      General: No focal deficit present.      Mental Status: He is alert, oriented to person, place, and time and easily aroused. Mental status is at baseline.   Psychiatric:         Attention and Perception: Attention normal.         Mood and Affect: Mood normal.         Speech: Speech normal.         Behavior: Behavior normal. Behavior is cooperative.          Cognition and Memory: Cognition normal.         Fluids  No intake or output data in the 24 hours ending 05/05/21 1033    Laboratory  Recent Labs     05/03/21  1930 05/04/21  0317 05/05/21  0020   WBC 6.2 4.4* 4.1*   RBC 3.60* 3.45* 3.39*   HEMOGLOBIN 9.2* 8.8* 8.5*   HEMATOCRIT 29.0* 27.8* 27.4*   MCV 80.6* 80.6* 80.8*   MCH 25.6* 25.5* 25.1*   MCHC 31.7* 31.7* 31.0*   RDW 43.2 43.8 43.8   PLATELETCT 272 207 230   MPV 10.4 10.2 10.5     Recent Labs     05/03/21  0033 05/04/21 0317 05/05/21  0020   SODIUM 130* 137 135   POTASSIUM 3.5* 4.1 3.9   CHLORIDE 100 105 106   CO2 23 21 23   GLUCOSE 80 75 86   BUN 21 19 19   CREATININE 1.82* 1.63* 1.52*   CALCIUM 9.3 8.7 8.8          Lab Results   Component Value Date/Time    HEPAIGM Non-Reactive 05/01/2021 1210    HEPBSAG Non-Reactive 05/01/2021 1210    HEPBCORIGM Non-Reactive 05/01/2021 1210    HEPCAB Non-Reactive 05/01/2021 1210     Results from last 7 days   Lab Units 05/05/21  0020 05/04/21  0317 05/03/21  0033 05/02/21  0304 05/01/21  1210   URIC ACID 107 mg/dL 7.4 7.5 7.5 7.7 7.8     Results from last 7 days   Lab Units 05/01/21  1210   LDH TOTAL 115 U/L 157                 Imaging  IR-PICC LINE PLACEMENT W/ GUIDANCE > AGE 5   Final Result                  Ultrasound-guided PICC placement performed by qualified nursing staff as    above.          EC-ECHOCARDIOGRAM COMPLETE W/O CONT   Final Result      CT-NEEDLE BX-ABD-RETROPERITONL   Final Result      1.  CT GUIDED RIGHT RETROPERITONEAL BIOPSY. MASSIVE RIGHT-SIDED PARA-AORTIC ADENOPATHY WAS TARGETED.      DX-CYSTO FLUORO > 1 HOUR   Final Result      Cysto fluoroscopy utilized for 36 seconds         INTERPRETING LOCATION: [facility], [place], [address]      CT-ABDOMEN-PELVIS W/O   Final Result      1.  Very large retroperitoneal mass surrounding LEFT kidney and obstructing LEFT ureter.  Mass also surrounds the aorta, superior mesenteric artery and vein.   2.  Nonobstructing RIGHT kidney stone.   3.  Minimal ascites.   4.   Evaluation is limited due to lack of IV contrast.      OUTSIDE IMAGES-DX CHEST   Final Result      OUTSIDE IMAGES-CT CHEST/ABDOMEN/PELVIS   Final Result      NM-PETCT-WHOLE BODY    (Results Pending)   CT-CHEST (THORAX) W/O    (Results Pending)   CT-SOFT TISSUE NECK W/O    (Results Pending)        Assessment/Plan  * Retroperitoneal mass  Assessment & Plan  Prelim pathology positive for aggressive B-cell lymphoma, pending final results   s/p biopsy   Oncology is following   -echo: 65% EF  -bone marrow bx completed, results pending  -uric acid, LDH within normal limits, hepatitis, and HIV negative  -He refused PET scan, so will proceed with CT head and neck   -back pain management  -PICC line in place     Acute kidney injury superimposed on CKD (HCC)  Assessment & Plan  Probably secondary to left sided hydronephrosis causing post obstruction failure  S/p ureteral stent   Monitor Cr  Avoid nephrotoxic agents   improving    Hypertension  Assessment & Plan  Continue with atenolol and amlodipine     Pleural effusion on left  Assessment & Plan  Unclear suspect secondary to malignancy.   Small in size per CT L>R  Will monitor, no indication for thoracentesis at this time.    Hydronephrosis  Assessment & Plan  From mass causing obstruction.  s/p cystoscopy and ureteral stent placement 4/28   Monitor Cr, improving         VTE prophylaxis: Lovenox     I have performed a physical exam and reviewed and updated ROS and Plan today (5/5/2021). In review of yesterday's note (5/4/2021), there are no changes except as documented above.     SHEREE Coles.

## 2021-05-05 NOTE — DISCHARGE PLANNING
[facility] R/T transport planned for 5/6, pickup at 11:30 am by DELMER.  RN to accompany pt. To and from PET scan.  Confirmed with Veto at [facility]

## 2021-05-05 NOTE — DISCHARGE PLANNING
[facility] R/T transport planned for 5/6, pickup at 11:30 am by DELMER.  RN to accompany pt. To and from PET scan.  Confirmed with Veto at [facility]

## 2021-05-05 NOTE — CARE PLAN
Problem: Communication  Goal: The ability to communicate needs accurately and effectively will improve  Outcome: PROGRESSING AS EXPECTED  Note: Pt/family involved in POC. Addressed questions/concerns     Problem: Safety  Goal: Will remain free from injury  Outcome: PROGRESSING AS EXPECTED  Note: Upself with steady gait. Bed in lowest position, call light and personal belongings within reach, educated to call if in need of assistance, non skid socks     Problem: Pain Management  Goal: Pain level will decrease to patient's comfort goal  Outcome: PROGRESSING AS EXPECTED  Note: Rest, repositioned, ambulation, and medicated per MAR

## 2021-05-05 NOTE — PROGRESS NOTES
Oncology/Hematology Progress Note               Author: Veronica Pond M.D. Date & Time created: 5/5/2021  11:40 AM     Interval History:  No acute events overnight. He is very anxious and had a panic attack today about PET/CT scan. He declines a PET/CT scan states he can't do it even with sedation. He is ok with CT scan to complete staging evaluation.    Review of Systems:  Review of Systems   All other systems reviewed and are negative.      Physical Exam:  Physical Exam  Constitutional:       Appearance: Normal appearance. He is not ill-appearing.   HENT:      Head: Normocephalic and atraumatic.      Right Ear: External ear normal.      Left Ear: External ear normal.      Nose: Nose normal.      Mouth/Throat:      Pharynx: No oropharyngeal exudate.   Eyes:      General: No scleral icterus.     Conjunctiva/sclera: Conjunctivae normal.   Cardiovascular:      Rate and Rhythm: Normal rate.   Pulmonary:      Effort: Pulmonary effort is normal. No respiratory distress.   Abdominal:      General: Abdomen is flat. Bowel sounds are normal.      Palpations: Abdomen is soft.      Tenderness: There is no abdominal tenderness.   Musculoskeletal:         General: No swelling or tenderness.      Cervical back: Neck supple.   Skin:     General: Skin is warm and dry.   Neurological:      General: No focal deficit present.      Mental Status: He is alert and oriented to person, place, and time.   Psychiatric:         Mood and Affect: Mood normal.         Behavior: Behavior normal.         Thought Content: Thought content normal.         Judgment: Judgment normal.         Labs:          Recent Labs     05/03/21  0033 05/04/21 0317 05/05/21  0020   SODIUM 130* 137 135   POTASSIUM 3.5* 4.1 3.9   CHLORIDE 100 105 106   CO2 23 21 23   BUN 21 19 19   CREATININE 1.82* 1.63* 1.52*   MAGNESIUM  --  1.7  --    PHOSPHORUS 2.5 2.7  --    CALCIUM 9.3 8.7 8.8     Recent Labs     05/03/21  0033 05/04/21  0317 05/05/21  0020   ALTSGPT  --  7  6   ASTSGOT  --  19 16   ALKPHOSPHAT  --  71 69   TBILIRUBIN  --  0.2 0.2   GLUCOSE 80 75 86     Recent Labs     21  0020   RBC 3.60* 3.45* 3.39*   HEMOGLOBIN 9.2* 8.8* 8.5*   HEMATOCRIT 29.0* 27.8* 27.4*   PLATELETCT 272 207 230     Recent Labs     21  0020   WBC 6.2 4.4* 4.1*   NEUTSPOLYS 79.10* 76.10* 73.00*   LYMPHOCYTES 4.70* 6.80* 7.60*   MONOCYTES 12.50 13.70* 14.20*   EOSINOPHILS 2.90 2.70 4.20   BASOPHILS 0.50 0.50 0.50   ASTSGOT  --  19 16   ALTSGPT  --  7 6   ALKPHOSPHAT  --  71 69   TBILIRUBIN  --  0.2 0.2     Recent Labs     21  0020   SODIUM 130* 137 135   POTASSIUM 3.5* 4.1 3.9   CHLORIDE 100 105 106   CO2 23 21 23   GLUCOSE 80 75 86   BUN 21 19 19   CREATININE 1.82* 1.63* 1.52*   CALCIUM 9.3 8.7 8.8     Hemodynamics:  Temp (24hrs), Av.3 °C (99.2 °F), Min:37.2 °C (98.9 °F), Max:37.4 °C (99.4 °F)  Temperature: 37.4 °C (99.3 °F)  Pulse  Av.3  Min: 61  Max: 88   Blood Pressure : 130/65     Respiratory:    Respiration: 17, Pulse Oximetry: 95 %        RLL Breath Sounds: Diminished, LLL Breath Sounds: Diminished  Fluids:    Intake/Output Summary (Last 24 hours) at 5/3/2021 1253  Last data filed at 2021 1545  Gross per 24 hour   Intake 240 ml   Output 200 ml   Net 40 ml        GI/Nutrition:  Orders Placed This Encounter   Procedures   • Diet Order Diet: Regular     Standing Status:   Standing     Number of Occurrences:   1     Order Specific Question:   Diet:     Answer:   Regular [1]     Medical Decision Making, by Problem:  Active Hospital Problems    Diagnosis    • *Retroperitoneal mass [R19.00]    • Hypercalcemia [E83.52]    • Acute kidney injury superimposed on CKD (HCC) [N17.9, N18.9]    • Hydronephrosis [N13.30]    • Pleural effusion on left [J90]    • Hypertension [I10]        Assessment and Plan:  69 y/o otherwise healthy man presented with pain found to have a very large RP mass.  This was biopsied on 4/29/21 and showed aggressive B-cell lymphoma, final path pending.    # Aggressive B-cell Lymphoma, FISH results pending - large RP mass on imaging causing renal dysfunction, s/p stent. Reviewed the diagnosis in detail with the patient and his wife.   - ECHO normal EF of 65%  - PET/CT scan, - pt declines even with sedation, will order CT scans to complete staging imaging, pt understands this is not the preferred staging evaluation for his lymphoma but adamantly declines a PET/CT scan.  - Bone marrow biopsy - completed on 5/3/21, normal prelim  - PICC line placement - completed  - Continue allopurinol  - Discussed starting R-EPOCH tomorrow, FISH results likely not going to be back until early next week and discussed this all in detail with the patient and his wife. Discussed that if he is a double hit lymphoma will continue with R-EPOCH to complete the 6 cycles but if not, then can de-escalate with Cycle 2 to R-CHOP. He was in agreement, will review again tomorrow prior to starting treatment  - CT neck and chest ordered    # Acute Kidney Injury - 2/2 RP mass  - s/p stent placement, creatinine improving, monitor hopefully gets near normal prior to starting chemotherapy, this continues to improve    # Anemia, microcytic - Hemoglobin stable around 9  - will get iron panel, b12, folate, ferritin unlikely to be reliable    Will Continue to follow   High Complexity, High risk patient  Quality-Core Measures

## 2021-05-06 PROBLEM — J90 PLEURAL EFFUSION ON LEFT: Status: RESOLVED | Noted: 2021-04-28 | Resolved: 2021-05-06

## 2021-05-06 PROBLEM — N18.9 ANEMIA DUE TO CHRONIC KIDNEY DISEASE: Status: ACTIVE | Noted: 2021-05-06

## 2021-05-06 PROBLEM — D63.8 ANEMIA OF CHRONIC DISEASE: Status: ACTIVE | Noted: 2021-05-06

## 2021-05-06 PROBLEM — D63.1 ANEMIA DUE TO CHRONIC KIDNEY DISEASE: Status: ACTIVE | Noted: 2021-05-06

## 2021-05-06 PROBLEM — C83.38 DIFFUSE LARGE B-CELL LYMPHOMA OF LYMPH NODES OF MULTIPLE REGIONS (HCC): Status: ACTIVE | Noted: 2021-05-06

## 2021-05-06 LAB
ANION GAP SERPL CALC-SCNC: 11 MMOL/L (ref 7–16)
BUN SERPL-MCNC: 19 MG/DL (ref 8–22)
CALCIUM SERPL-MCNC: 8.8 MG/DL (ref 8.5–10.5)
CHLORIDE SERPL-SCNC: 103 MMOL/L (ref 96–112)
CO2 SERPL-SCNC: 22 MMOL/L (ref 20–33)
CREAT SERPL-MCNC: 1.56 MG/DL (ref 0.5–1.4)
ERYTHROCYTE [DISTWIDTH] IN BLOOD BY AUTOMATED COUNT: 43.4 FL (ref 35.9–50)
GLUCOSE SERPL-MCNC: 83 MG/DL (ref 65–99)
HCT VFR BLD AUTO: 30 % (ref 42–52)
HGB BLD-MCNC: 9.6 G/DL (ref 14–18)
IRON SATN MFR SERPL: 13 % (ref 15–55)
IRON SERPL-MCNC: 22 UG/DL (ref 50–180)
MCH RBC QN AUTO: 25.4 PG (ref 27–33)
MCHC RBC AUTO-ENTMCNC: 32 G/DL (ref 33.7–35.3)
MCV RBC AUTO: 79.4 FL (ref 81.4–97.8)
PLATELET # BLD AUTO: 311 K/UL (ref 164–446)
PMV BLD AUTO: 9.9 FL (ref 9–12.9)
POTASSIUM SERPL-SCNC: 4.1 MMOL/L (ref 3.6–5.5)
RBC # BLD AUTO: 3.78 M/UL (ref 4.7–6.1)
SODIUM SERPL-SCNC: 136 MMOL/L (ref 135–145)
TIBC SERPL-MCNC: 170 UG/DL (ref 250–450)
UIBC SERPL-MCNC: 148 UG/DL (ref 110–370)
URATE SERPL-MCNC: 7.4 MG/DL (ref 2.5–8.3)
VIT B12 SERPL-MCNC: 1561 PG/ML (ref 211–911)
WBC # BLD AUTO: 6.1 K/UL (ref 4.8–10.8)

## 2021-05-06 PROCEDURE — 84550 ASSAY OF BLOOD/URIC ACID: CPT

## 2021-05-06 PROCEDURE — A9270 NON-COVERED ITEM OR SERVICE: HCPCS | Performed by: INTERNAL MEDICINE

## 2021-05-06 PROCEDURE — 85027 COMPLETE CBC AUTOMATED: CPT

## 2021-05-06 PROCEDURE — 80048 BASIC METABOLIC PNL TOTAL CA: CPT

## 2021-05-06 PROCEDURE — 99497 ADVNCD CARE PLAN 30 MIN: CPT | Performed by: NURSE PRACTITIONER

## 2021-05-06 PROCEDURE — 700105 HCHG RX REV CODE 258: Performed by: INTERNAL MEDICINE

## 2021-05-06 PROCEDURE — 99233 SBSQ HOSP IP/OBS HIGH 50: CPT | Performed by: NURSE PRACTITIONER

## 2021-05-06 PROCEDURE — 700111 HCHG RX REV CODE 636 W/ 250 OVERRIDE (IP): Performed by: STUDENT IN AN ORGANIZED HEALTH CARE EDUCATION/TRAINING PROGRAM

## 2021-05-06 PROCEDURE — 700102 HCHG RX REV CODE 250 W/ 637 OVERRIDE(OP): Performed by: INTERNAL MEDICINE

## 2021-05-06 PROCEDURE — 770004 HCHG ROOM/CARE - ONCOLOGY PRIVATE *

## 2021-05-06 PROCEDURE — 700102 HCHG RX REV CODE 250 W/ 637 OVERRIDE(OP): Performed by: STUDENT IN AN ORGANIZED HEALTH CARE EDUCATION/TRAINING PROGRAM

## 2021-05-06 PROCEDURE — 82607 VITAMIN B-12: CPT

## 2021-05-06 PROCEDURE — 700111 HCHG RX REV CODE 636 W/ 250 OVERRIDE (IP): Mod: JG | Performed by: INTERNAL MEDICINE

## 2021-05-06 PROCEDURE — A9270 NON-COVERED ITEM OR SERVICE: HCPCS | Performed by: STUDENT IN AN ORGANIZED HEALTH CARE EDUCATION/TRAINING PROGRAM

## 2021-05-06 PROCEDURE — 83540 ASSAY OF IRON: CPT

## 2021-05-06 PROCEDURE — 83550 IRON BINDING TEST: CPT

## 2021-05-06 RX ORDER — 0.9 % SODIUM CHLORIDE 0.9 %
10 VIAL (ML) INJECTION PRN
Status: CANCELLED | OUTPATIENT
Start: 2021-05-06

## 2021-05-06 RX ORDER — 0.9 % SODIUM CHLORIDE 0.9 %
VIAL (ML) INJECTION PRN
Status: CANCELLED | OUTPATIENT
Start: 2021-05-10

## 2021-05-06 RX ORDER — PROCHLORPERAZINE MALEATE 10 MG
10 TABLET ORAL EVERY 6 HOURS PRN
Status: CANCELLED | OUTPATIENT
Start: 2021-05-08

## 2021-05-06 RX ORDER — HEPARIN SODIUM (PORCINE) LOCK FLUSH IV SOLN 100 UNIT/ML 100 UNIT/ML
500 SOLUTION INTRAVENOUS PRN
Status: CANCELLED | OUTPATIENT
Start: 2021-05-10

## 2021-05-06 RX ORDER — HEPARIN SODIUM (PORCINE) LOCK FLUSH IV SOLN 100 UNIT/ML 100 UNIT/ML
500 SOLUTION INTRAVENOUS PRN
Status: CANCELLED | OUTPATIENT
Start: 2021-05-08

## 2021-05-06 RX ORDER — PROCHLORPERAZINE MALEATE 10 MG
10 TABLET ORAL EVERY 6 HOURS PRN
Status: CANCELLED | OUTPATIENT
Start: 2021-05-09

## 2021-05-06 RX ORDER — 0.9 % SODIUM CHLORIDE 0.9 %
10 VIAL (ML) INJECTION PRN
Status: CANCELLED | OUTPATIENT
Start: 2021-05-09

## 2021-05-06 RX ORDER — PROCHLORPERAZINE EDISYLATE 5 MG/ML
10 INJECTION INTRAMUSCULAR; INTRAVENOUS EVERY 6 HOURS PRN
Status: CANCELLED | OUTPATIENT
Start: 2021-05-07

## 2021-05-06 RX ORDER — PROCHLORPERAZINE MALEATE 10 MG
10 TABLET ORAL EVERY 6 HOURS PRN
Status: CANCELLED | OUTPATIENT
Start: 2021-05-07

## 2021-05-06 RX ORDER — PROCHLORPERAZINE MALEATE 10 MG
10 TABLET ORAL EVERY 6 HOURS PRN
Status: CANCELLED | OUTPATIENT
Start: 2021-05-06

## 2021-05-06 RX ORDER — MEPERIDINE HYDROCHLORIDE 25 MG/ML
25 INJECTION INTRAMUSCULAR; INTRAVENOUS; SUBCUTANEOUS PRN
Status: CANCELLED | OUTPATIENT
Start: 2021-05-06 | End: 2021-05-08

## 2021-05-06 RX ORDER — LORAZEPAM 2 MG/ML
0.5 INJECTION INTRAMUSCULAR EVERY 6 HOURS PRN
Status: CANCELLED | OUTPATIENT
Start: 2021-05-08

## 2021-05-06 RX ORDER — LORAZEPAM 0.5 MG/1
0.5 TABLET ORAL EVERY 6 HOURS PRN
Status: CANCELLED | OUTPATIENT
Start: 2021-05-08

## 2021-05-06 RX ORDER — 0.9 % SODIUM CHLORIDE 0.9 %
3 VIAL (ML) INJECTION PRN
Status: CANCELLED | OUTPATIENT
Start: 2021-05-10

## 2021-05-06 RX ORDER — LORAZEPAM 2 MG/ML
0.5 INJECTION INTRAMUSCULAR EVERY 6 HOURS PRN
Status: CANCELLED | OUTPATIENT
Start: 2021-05-07

## 2021-05-06 RX ORDER — 0.9 % SODIUM CHLORIDE 0.9 %
VIAL (ML) INJECTION PRN
Status: CANCELLED | OUTPATIENT
Start: 2021-05-08

## 2021-05-06 RX ORDER — LORAZEPAM 0.5 MG/1
0.5 TABLET ORAL EVERY 6 HOURS PRN
Status: CANCELLED | OUTPATIENT
Start: 2021-05-07

## 2021-05-06 RX ORDER — ONDANSETRON 2 MG/ML
8 INJECTION INTRAMUSCULAR; INTRAVENOUS EVERY 8 HOURS PRN
Status: CANCELLED | OUTPATIENT
Start: 2021-05-06

## 2021-05-06 RX ORDER — DIPHENHYDRAMINE HYDROCHLORIDE 50 MG/ML
25 INJECTION INTRAMUSCULAR; INTRAVENOUS PRN
Status: CANCELLED | OUTPATIENT
Start: 2021-05-06 | End: 2021-05-08

## 2021-05-06 RX ORDER — ONDANSETRON 2 MG/ML
8 INJECTION INTRAMUSCULAR; INTRAVENOUS EVERY 8 HOURS PRN
Status: CANCELLED | OUTPATIENT
Start: 2021-05-10

## 2021-05-06 RX ORDER — ACETAMINOPHEN 325 MG/1
650 TABLET ORAL PRN
Status: CANCELLED | OUTPATIENT
Start: 2021-05-06 | End: 2021-05-08

## 2021-05-06 RX ORDER — LORAZEPAM 2 MG/ML
0.5 INJECTION INTRAMUSCULAR EVERY 6 HOURS PRN
Status: CANCELLED | OUTPATIENT
Start: 2021-05-10

## 2021-05-06 RX ORDER — ALLOPURINOL 300 MG/1
300 TABLET ORAL DAILY
Status: DISCONTINUED | OUTPATIENT
Start: 2021-05-06 | End: 2021-05-12 | Stop reason: HOSPADM

## 2021-05-06 RX ORDER — ONDANSETRON 8 MG/1
8 TABLET, ORALLY DISINTEGRATING ORAL EVERY 8 HOURS PRN
Status: CANCELLED | OUTPATIENT
Start: 2021-05-10

## 2021-05-06 RX ORDER — 0.9 % SODIUM CHLORIDE 0.9 %
3 VIAL (ML) INJECTION PRN
Status: CANCELLED | OUTPATIENT
Start: 2021-05-08

## 2021-05-06 RX ORDER — PROCHLORPERAZINE EDISYLATE 5 MG/ML
10 INJECTION INTRAMUSCULAR; INTRAVENOUS EVERY 6 HOURS PRN
Status: CANCELLED | OUTPATIENT
Start: 2021-05-09

## 2021-05-06 RX ORDER — LORAZEPAM 0.5 MG/1
0.5 TABLET ORAL EVERY 6 HOURS PRN
Status: CANCELLED | OUTPATIENT
Start: 2021-05-10

## 2021-05-06 RX ORDER — FAMOTIDINE 20 MG/1
20 TABLET, FILM COATED ORAL 2 TIMES DAILY
Status: DISCONTINUED | OUTPATIENT
Start: 2021-05-06 | End: 2021-05-12 | Stop reason: HOSPADM

## 2021-05-06 RX ORDER — ONDANSETRON 2 MG/ML
8 INJECTION INTRAMUSCULAR; INTRAVENOUS EVERY 8 HOURS PRN
Status: CANCELLED | OUTPATIENT
Start: 2021-05-09

## 2021-05-06 RX ORDER — LORAZEPAM 0.5 MG/1
0.5 TABLET ORAL EVERY 6 HOURS PRN
Status: CANCELLED | OUTPATIENT
Start: 2021-05-09

## 2021-05-06 RX ORDER — PROCHLORPERAZINE EDISYLATE 5 MG/ML
10 INJECTION INTRAMUSCULAR; INTRAVENOUS EVERY 6 HOURS PRN
Status: CANCELLED | OUTPATIENT
Start: 2021-05-06

## 2021-05-06 RX ORDER — PROCHLORPERAZINE MALEATE 10 MG
10 TABLET ORAL EVERY 6 HOURS PRN
Status: CANCELLED | OUTPATIENT
Start: 2021-05-10

## 2021-05-06 RX ORDER — PROCHLORPERAZINE EDISYLATE 5 MG/ML
10 INJECTION INTRAMUSCULAR; INTRAVENOUS EVERY 6 HOURS PRN
Status: CANCELLED | OUTPATIENT
Start: 2021-05-10

## 2021-05-06 RX ORDER — 0.9 % SODIUM CHLORIDE 0.9 %
VIAL (ML) INJECTION PRN
Status: CANCELLED | OUTPATIENT
Start: 2021-05-07

## 2021-05-06 RX ORDER — ONDANSETRON 8 MG/1
8 TABLET, ORALLY DISINTEGRATING ORAL EVERY 8 HOURS PRN
Status: CANCELLED | OUTPATIENT
Start: 2021-05-08

## 2021-05-06 RX ORDER — 0.9 % SODIUM CHLORIDE 0.9 %
10 VIAL (ML) INJECTION PRN
Status: CANCELLED | OUTPATIENT
Start: 2021-05-10

## 2021-05-06 RX ORDER — ONDANSETRON 2 MG/ML
8 INJECTION INTRAMUSCULAR; INTRAVENOUS EVERY 8 HOURS PRN
Status: CANCELLED | OUTPATIENT
Start: 2021-05-08

## 2021-05-06 RX ORDER — 0.9 % SODIUM CHLORIDE 0.9 %
3 VIAL (ML) INJECTION PRN
Status: CANCELLED | OUTPATIENT
Start: 2021-05-07

## 2021-05-06 RX ORDER — 0.9 % SODIUM CHLORIDE 0.9 %
VIAL (ML) INJECTION PRN
Status: CANCELLED | OUTPATIENT
Start: 2021-05-06

## 2021-05-06 RX ORDER — HEPARIN SODIUM (PORCINE) LOCK FLUSH IV SOLN 100 UNIT/ML 100 UNIT/ML
500 SOLUTION INTRAVENOUS PRN
Status: CANCELLED | OUTPATIENT
Start: 2021-05-09

## 2021-05-06 RX ORDER — HEPARIN SODIUM (PORCINE) LOCK FLUSH IV SOLN 100 UNIT/ML 100 UNIT/ML
500 SOLUTION INTRAVENOUS PRN
Status: CANCELLED | OUTPATIENT
Start: 2021-05-07

## 2021-05-06 RX ORDER — LORAZEPAM 2 MG/ML
0.5 INJECTION INTRAMUSCULAR EVERY 6 HOURS PRN
Status: CANCELLED | OUTPATIENT
Start: 2021-05-09

## 2021-05-06 RX ORDER — 0.9 % SODIUM CHLORIDE 0.9 %
VIAL (ML) INJECTION PRN
Status: CANCELLED | OUTPATIENT
Start: 2021-05-09

## 2021-05-06 RX ORDER — 0.9 % SODIUM CHLORIDE 0.9 %
10 VIAL (ML) INJECTION PRN
Status: CANCELLED | OUTPATIENT
Start: 2021-05-08

## 2021-05-06 RX ORDER — 0.9 % SODIUM CHLORIDE 0.9 %
10 VIAL (ML) INJECTION PRN
Status: CANCELLED | OUTPATIENT
Start: 2021-05-07

## 2021-05-06 RX ORDER — 0.9 % SODIUM CHLORIDE 0.9 %
3 VIAL (ML) INJECTION PRN
Status: CANCELLED | OUTPATIENT
Start: 2021-05-06

## 2021-05-06 RX ORDER — HEPARIN SODIUM (PORCINE) LOCK FLUSH IV SOLN 100 UNIT/ML 100 UNIT/ML
500 SOLUTION INTRAVENOUS PRN
Status: CANCELLED | OUTPATIENT
Start: 2021-05-06

## 2021-05-06 RX ORDER — 0.9 % SODIUM CHLORIDE 0.9 %
3 VIAL (ML) INJECTION PRN
Status: CANCELLED | OUTPATIENT
Start: 2021-05-09

## 2021-05-06 RX ORDER — ONDANSETRON 2 MG/ML
8 INJECTION INTRAMUSCULAR; INTRAVENOUS EVERY 8 HOURS PRN
Status: CANCELLED | OUTPATIENT
Start: 2021-05-07

## 2021-05-06 RX ORDER — LORAZEPAM 0.5 MG/1
0.5 TABLET ORAL EVERY 6 HOURS PRN
Status: CANCELLED | OUTPATIENT
Start: 2021-05-06

## 2021-05-06 RX ORDER — ONDANSETRON 8 MG/1
8 TABLET, ORALLY DISINTEGRATING ORAL EVERY 8 HOURS PRN
Status: CANCELLED | OUTPATIENT
Start: 2021-05-07

## 2021-05-06 RX ORDER — ONDANSETRON 8 MG/1
8 TABLET, ORALLY DISINTEGRATING ORAL EVERY 8 HOURS PRN
Status: CANCELLED | OUTPATIENT
Start: 2021-05-09

## 2021-05-06 RX ORDER — ACYCLOVIR 800 MG/1
400 TABLET ORAL 2 TIMES DAILY
Status: COMPLETED | OUTPATIENT
Start: 2021-05-06 | End: 2021-05-11

## 2021-05-06 RX ORDER — ONDANSETRON 8 MG/1
8 TABLET, ORALLY DISINTEGRATING ORAL EVERY 8 HOURS PRN
Status: CANCELLED | OUTPATIENT
Start: 2021-05-06

## 2021-05-06 RX ORDER — LORAZEPAM 2 MG/ML
0.5 INJECTION INTRAMUSCULAR EVERY 6 HOURS PRN
Status: CANCELLED | OUTPATIENT
Start: 2021-05-06

## 2021-05-06 RX ORDER — PROCHLORPERAZINE EDISYLATE 5 MG/ML
10 INJECTION INTRAMUSCULAR; INTRAVENOUS EVERY 6 HOURS PRN
Status: CANCELLED | OUTPATIENT
Start: 2021-05-08

## 2021-05-06 RX ADMIN — ACYCLOVIR 400 MG: 800 TABLET ORAL at 18:02

## 2021-05-06 RX ADMIN — SODIUM CHLORIDE 150 MG: 9 INJECTION, SOLUTION INTRAVENOUS at 15:38

## 2021-05-06 RX ADMIN — ATENOLOL 50 MG: 50 TABLET ORAL at 10:14

## 2021-05-06 RX ADMIN — ENOXAPARIN SODIUM 40 MG: 40 INJECTION SUBCUTANEOUS at 04:58

## 2021-05-06 RX ADMIN — AMLODIPINE BESYLATE 10 MG: 10 TABLET ORAL at 04:59

## 2021-05-06 RX ADMIN — PREDNISONE 120 MG: 20 TABLET ORAL at 16:05

## 2021-05-06 RX ADMIN — FAMOTIDINE 20 MG: 20 TABLET ORAL at 18:02

## 2021-05-06 RX ADMIN — OMEPRAZOLE 20 MG: 20 CAPSULE, DELAYED RELEASE ORAL at 16:04

## 2021-05-06 RX ADMIN — DOCUSATE SODIUM 50 MG AND SENNOSIDES 8.6 MG 2 TABLET: 8.6; 5 TABLET, FILM COATED ORAL at 04:58

## 2021-05-06 RX ADMIN — VINCRISTINE SULFATE: 1 INJECTION, SOLUTION INTRAVENOUS at 17:50

## 2021-05-06 RX ADMIN — OXYCODONE 5 MG: 5 TABLET ORAL at 01:09

## 2021-05-06 RX ADMIN — OXYCODONE HYDROCHLORIDE 10 MG: 10 TABLET ORAL at 16:29

## 2021-05-06 RX ADMIN — ONDANSETRON HYDROCHLORIDE 16 MG: 2 SOLUTION INTRAMUSCULAR; INTRAVENOUS at 16:58

## 2021-05-06 RX ADMIN — ALLOPURINOL 300 MG: 300 TABLET ORAL at 16:04

## 2021-05-06 RX ADMIN — DOCUSATE SODIUM 50 MG AND SENNOSIDES 8.6 MG 2 TABLET: 8.6; 5 TABLET, FILM COATED ORAL at 18:02

## 2021-05-06 ASSESSMENT — COGNITIVE AND FUNCTIONAL STATUS - GENERAL
MOBILITY SCORE: 24
DAILY ACTIVITIY SCORE: 24
DAILY ACTIVITIY SCORE: 24
SUGGESTED CMS G CODE MODIFIER DAILY ACTIVITY: CH
SUGGESTED CMS G CODE MODIFIER MOBILITY: CH
SUGGESTED CMS G CODE MODIFIER MOBILITY: CH
SUGGESTED CMS G CODE MODIFIER DAILY ACTIVITY: CH
MOBILITY SCORE: 24

## 2021-05-06 ASSESSMENT — ENCOUNTER SYMPTOMS
PALPITATIONS: 0
NERVOUS/ANXIOUS: 0
BACK PAIN: 0
HEADACHES: 0
CHILLS: 0
FOCAL WEAKNESS: 0
COUGH: 0
VOMITING: 0
BLOOD IN STOOL: 0
DEPRESSION: 0
DIZZINESS: 0
WEIGHT LOSS: 0
CONSTIPATION: 0
NAUSEA: 0
WEAKNESS: 0
DIARRHEA: 0
MYALGIAS: 0
ABDOMINAL PAIN: 0
INSOMNIA: 0
SHORTNESS OF BREATH: 0
FEVER: 0
WHEEZING: 0

## 2021-05-06 ASSESSMENT — FIBROSIS 4 INDEX: FIB4 SCORE: 1.47

## 2021-05-06 ASSESSMENT — PAIN DESCRIPTION - PAIN TYPE
TYPE: ACUTE PAIN

## 2021-05-06 NOTE — PROGRESS NOTES
[facility] Medicine Daily Progress Note    Date of Service  5/6/2021    Chief Complaint  70 y.o. male admitted 4/27/2021 with weakness and back pain     Hospital Course  70 y.o. male with past medical history of hypertension on atenolol, CKD who presented 4/27/2021 as a transfer from [facility] for abnormal labs by PMD. Patient sates that he has been having back pain, low grade fevers, night sweats, and weight loss that started in November 2020. He characterizes his low back pain as migratory and dull, 6/10 in intensity, not relieved with anything exacerbated with movement. His PMD is Dr. Singh. On admission he had a calcium level of 14 and was given IVF and IV 4 mg zolendronic acid on admission. Repeat CT abdomen pelvis done without contrast due to patient's kidney function showed a large retroperitoneal mass surrounding the left kidney and obstructing the left ureter, mass also surrounds the aorta superior mesenteric artery and vein. Urology consulted for hydronephrosis.  S/p cystoscopy with ureteral stent insertion. IR biopsy done 4/29.  Pathology preliminary results aggressive B-cell lymphoma, oncology has been consulted.      Interval Problem Update  Cr stable at 1.56 from 1.52. He denies urinary symptoms. Will proceed with R-EPOCH today per Oncology. Monitor closely for adverse reactions .    Consultants/Specialty  Urology   IR  Oncology   Palliative Care     Code Status  Full Code    Disposition  Continue inpatient for R-EPOCH      Review of Systems  Review of Systems   Constitutional: Positive for malaise/fatigue. Negative for chills, fever and weight loss.   Respiratory: Negative for cough, shortness of breath and wheezing.    Cardiovascular: Negative for chest pain, palpitations and leg swelling.   Gastrointestinal: Negative for abdominal pain, blood in stool, constipation, diarrhea, nausea and vomiting.   Genitourinary: Negative for dysuria, frequency, hematuria and urgency.   Musculoskeletal: Negative  for back pain and myalgias.   Skin: Negative for rash.   Neurological: Negative for dizziness, focal weakness, weakness and headaches.   Psychiatric/Behavioral: Negative for depression. The patient is not nervous/anxious and does not have insomnia.    All other systems reviewed and are negative.       Physical Exam  Temp:  [36.7 °C (98.1 °F)-37.2 °C (99 °F)] 36.9 °C (98.4 °F)  Pulse:  [68-91] 91  Resp:  [17-18] 17  BP: (138-161)/(66-77) 153/68  SpO2:  [93 %-98 %] 94 %    Physical Exam  Vitals and nursing note reviewed.   Constitutional:       General: He is not in acute distress.     Appearance: He is overweight. He is ill-appearing.   HENT:      Head: Normocephalic and atraumatic.      Right Ear: External ear normal.      Left Ear: External ear normal.      Mouth/Throat:      Mouth: Mucous membranes are moist.      Pharynx: Oropharynx is clear.   Eyes:      Conjunctiva/sclera: Conjunctivae normal.   Cardiovascular:      Rate and Rhythm: Normal rate and regular rhythm.      Pulses: Normal pulses.   Pulmonary:      Effort: Pulmonary effort is normal. No respiratory distress.      Breath sounds: Normal breath sounds. No wheezing.   Abdominal:      General: Abdomen is flat. There is no distension.      Palpations: Abdomen is soft.      Tenderness: There is no abdominal tenderness.   Musculoskeletal:         General: No swelling. Normal range of motion.      Cervical back: Normal range of motion and neck supple. No rigidity.   Skin:     General: Skin is warm and dry.      Coloration: Skin is pale.   Neurological:      General: No focal deficit present.      Mental Status: He is alert, oriented to person, place, and time and easily aroused. Mental status is at baseline.   Psychiatric:         Attention and Perception: Attention normal.         Mood and Affect: Mood normal.         Speech: Speech normal.         Behavior: Behavior normal. Behavior is cooperative.         Cognition and Memory: Cognition normal.          Fluids  No intake or output data in the 24 hours ending 05/06/21 1533    Laboratory  Recent Labs     05/04/21  0317 05/05/21  0020 05/06/21  0100   WBC 4.4* 4.1* 6.1   RBC 3.45* 3.39* 3.78*   HEMOGLOBIN 8.8* 8.5* 9.6*   HEMATOCRIT 27.8* 27.4* 30.0*   MCV 80.6* 80.8* 79.4*   MCH 25.5* 25.1* 25.4*   MCHC 31.7* 31.0* 32.0*   RDW 43.8 43.8 43.4   PLATELETCT 207 230 311   MPV 10.2 10.5 9.9     Recent Labs     05/04/21  0317 05/05/21  0020 05/06/21  0100   SODIUM 137 135 136   POTASSIUM 4.1 3.9 4.1   CHLORIDE 105 106 103   CO2 21 23 22   GLUCOSE 75 86 83   BUN 19 19 19   CREATININE 1.63* 1.52* 1.56*   CALCIUM 8.7 8.8 8.8          Lab Results   Component Value Date/Time    HEPAIGM Non-Reactive 05/01/2021 1210    HEPBSAG Non-Reactive 05/01/2021 1210    HEPBCORIGM Non-Reactive 05/01/2021 1210    HEPCAB Non-Reactive 05/01/2021 1210     Results from last 7 days   Lab Units 05/06/21  0100 05/05/21  0020 05/04/21  0317 05/03/21  0033 05/02/21  0304 05/01/21  1210   URIC ACID 107 mg/dL 7.4 7.4 7.5 7.5 7.7 7.8     Results from last 7 days   Lab Units 05/01/21  1210   LDH TOTAL 115 U/L 157                 Imaging  CT-CHEST (THORAX) W/O   Final Result         1.  Large confluent presumed herb mass in the retroperitoneum and upper abdomen, and contiguous with retrocrural mass in keeping with biopsy-proven lymphoma.   2.  Small to moderate left and small right pleural effusions.               CT-SOFT TISSUE NECK W/O   Final Result      1.  No significant neck mass or adenopathy is seen on this noncontrast study.   2.  Bilateral pleural effusions.      IR-PICC LINE PLACEMENT W/ GUIDANCE > AGE 5   Final Result                  Ultrasound-guided PICC placement performed by qualified nursing staff as    above.          EC-ECHOCARDIOGRAM COMPLETE W/O CONT   Final Result      CT-NEEDLE BX-ABD-RETROPERITONL   Final Result      1.  CT GUIDED RIGHT RETROPERITONEAL BIOPSY. MASSIVE RIGHT-SIDED PARA-AORTIC ADENOPATHY WAS TARGETED.       DX-CYSTO FLUORO > 1 HOUR   Final Result      Cysto fluoroscopy utilized for 36 seconds         INTERPRETING LOCATION: [address], [place], [address]      CT-ABDOMEN-PELVIS W/O   Final Result      1.  Very large retroperitoneal mass surrounding LEFT kidney and obstructing LEFT ureter.  Mass also surrounds the aorta, superior mesenteric artery and vein.   2.  Nonobstructing RIGHT kidney stone.   3.  Minimal ascites.   4.  Evaluation is limited due to lack of IV contrast.      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)  Assessment & Plan  Started R-EPOCH   Monitor for TLS with Q12 BMP, phos, LDH, uric acid   Monitor renal function closely    Retroperitoneal mass  Assessment & Plan  Prelim pathology positive for aggressive B-cell lymphoma, pending final results   s/p biopsy   -echo: 65% EF  -bone marrow bx completed, results pending  -uric acid, LDH within normal limits, hepatitis, and HIV negative  -back pain management  -PICC line in place       Acute kidney injury superimposed on CKD (HCC)  Assessment & Plan  Probably secondary to left sided hydronephrosis causing post obstruction failure  S/p ureteral stent   Monitor Cr closely during chemotherapy   Maintain hydration    Anemia of chronic disease- (present on admission)  Assessment & Plan  H/H is stable   Monitor with CBC   Transfuse for Hgb < 7     Hypertension  Assessment & Plan  Continue with atenolol and amlodipine     Hydronephrosis  Assessment & Plan  From mass causing obstruction.  s/p cystoscopy and ureteral stent placement 4/28   Monitor Cr, improving         VTE prophylaxis: Lovenox     I have performed a physical exam and reviewed and updated ROS and Plan today (5/6/2021). In review of yesterday's note (5/5/2021), there are no changes except as documented above.     SHEREE Coles.

## 2021-05-06 NOTE — CARE PLAN
Problem: Communication  Goal: The ability to communicate needs accurately and effectively will improve  Outcome: PROGRESSING AS EXPECTED  Note: Pt/family involved in POC. Addressed questions/concerns     Problem: Pain Management  Goal: Pain level will decrease to patient's comfort goal  Outcome: PROGRESSING AS EXPECTED  Note: Rest, repositioned, ambulation, and medicated per MAR

## 2021-05-06 NOTE — PROGRESS NOTES
"Pharmacy Chemotherapy Verification  Patient Name: Khalif Kirkpatrick   Dx: B Cell Lymphoma  Protocol: R-EPOCH       *Dosing Reference*  RiTUXimab-pvvr 375 mg/m2 IV on Day 1   -5/6/21 - Day 5 for Cycle 1 due to risk of TLS for Cycle 1 per Dr. Pond  Etoposide  IV continuous infusion over 24 hours daily on Days 1-4   -5/6/21 - Dose reduced to 35 mg/m2 due to renal impairment  DOXOrubicin 10 mg/m2 IV continuous infusion over 24 hours daily on Days 1-4  VinCRIStine 0.4 mg/m2 IV continuous infusion over 24 hour daily on Days 1-4  Cyclophosphamide 750 mg/m2 IV over 30 minutes on Day 5  Prednisone 60 mg/m2 PO twice daily on Days 1-5  21-day cycle for 6 cycles    Dose Adjustment based on twice weekly CBCs, at least 3 days apart:  Vimal measurements Dose-adjustment   If vimal ANC > 0.5 x 109/L Increase 1 dose level  in etoposide, doxorubicin, and cyclophosphamide above last cycle   If vimal ANC < 0.5 x 109/L on 1 or 2 measurements Same dose(s) as last cycle   If vimal ANC < 0.5 x 109/L on at least 3 measurements  Decrease 1 dose level in etoposide, doxorubicin, and cyclophosphamide below last cycle   OR     If vimal platelet count < 25 x 109/L on 1 measurement Decrease 1 dose level in etoposide, doxorubicin, and cyclophosphamide below last cycle   • Dose adjustments above starting dose level (level 1) apply to etoposide, doxorubicin and cyclophosphamide  • Dose adjustments below starting dose level (level 1) apply to cyclophosphamide only.   NCCN Guidelines for B-Cell Lymphomas V.1.2019.  Abimael HUSSEIN, et al. Blood. 2002;99(8):2685-93.  Abimael HUSSEIN, et al. J Clin Oncol. 2008;26(16):2717-24.    Allergies:  Ibuprofen     /64   Pulse 78   Temp 36.8 °C (98.2 °F) (Temporal)   Resp 18   Ht 1.753 m (5' 9\")   Wt 86.2 kg (190 lb 0.6 oz)   SpO2 98%   BMI 28.06 kg/m²  Body surface area is 2.05 meters squared.     Labs 5/7/21  Hgb 9           Plt 220k  SCr 1.54                     CrCl 53.8 mL/min   Uric acid 6.8         "              Labs 5/5/21  ANC 2980         AST/ALT/AP = 16/6/69           Tbili = 0.2     ECHO 5/2/21  LVEF 65%      5/1/2021 12:10   Hepatitis B Surface Antigen Non-Reactive   Hepatitis B Cors Ab,IgM Non-Reactive     Drug Order   (Drug name, dose, route, IV Fluid & volume, frequency, number of doses) Cycle: 1 Day 2 of 5      Previous treatment: N/A     Medication = etoposide  Base Dose = 35 mg/m2  Calc Dose: Base Dose x 2.05 m2 = 71.75 mg  Final Dose = 71.8 mg  Route = IV  Fluid & Volume = NS 1000 mL  Admin Duration = Over 24 hours Admixed with DOXOrubicin and vinCRIStine  Days 1-4       <10% difference, okay to treat with final dose   Medication = DOXOrubicin  Base Dose = 10 mg/m2  Calc Dose:Base Dose x 2.05 m2 = 20.5 mg  Final Dose = 20.5 mg  Route = IV  Fluid & Volume = NS 1000 mL  Admin Duration = Over 24 hours   Admixed with etoposide and vinCRIStine   Days 1-4      <10% difference, okay to treat with final dose   Medication = vinCRIStine  Base Dose = 0.4 mg/m2  Calc Dose: Base Dose x 2.05 m2 = 0.82 mg  Final Dose = 0.8 mg  Route = IV  Fluid & Volume = NS 1000 mL  Admin Duration = Over 24 hours   Admixed with etoposide and DOXOorubicin   Days 1-4      <10% difference, okay to treat with final dose   By my signature below, I confirm this process was performed independently with the BSA and all final chemotherapy dosing calculations congruent. I have reviewed the above chemotherapy order and that my calculation of the final dose and BSA (when applicable) corroborate those calculations of the  pharmacist. Discrepancies of 10% or greater in the written dose have been addressed and documented within the [facility] Progress notes.    Maureen Dee, PharmD, BCOP

## 2021-05-06 NOTE — PROGRESS NOTES
Patient is  alert and oriented. Patient is eating breakfast at this time. Will continue to monitor patient. Call light within reach.

## 2021-05-06 NOTE — PROGRESS NOTES
Oncology/Hematology Progress Note               Author: Veronica Pond M.D. Date & Time created: 5/6/2021  1:29 PM     Interval History:  No acute events overnight. He is anxious to start chemotherapy. He has no new symptoms to report. He has fatigue. No fevers, cough, SOB. Still with back pain.     Review of Systems:  Review of Systems   All other systems reviewed and are negative.      Physical Exam:  Physical Exam  Constitutional:       Appearance: Normal appearance. He is not ill-appearing.   HENT:      Head: Normocephalic and atraumatic.      Right Ear: External ear normal.      Left Ear: External ear normal.      Nose: Nose normal.      Mouth/Throat:      Pharynx: No oropharyngeal exudate.   Eyes:      General: No scleral icterus.     Conjunctiva/sclera: Conjunctivae normal.   Cardiovascular:      Rate and Rhythm: Normal rate.   Pulmonary:      Effort: Pulmonary effort is normal. No respiratory distress.   Musculoskeletal:         General: No swelling or tenderness.      Cervical back: Neck supple.   Skin:     General: Skin is warm and dry.   Neurological:      General: No focal deficit present.      Mental Status: He is alert and oriented to person, place, and time.   Psychiatric:         Mood and Affect: Mood normal.         Behavior: Behavior normal.         Thought Content: Thought content normal.         Judgment: Judgment normal.         Labs:          Recent Labs     05/04/21 0317 05/05/21 0020 05/06/21  0100   SODIUM 137 135 136   POTASSIUM 4.1 3.9 4.1   CHLORIDE 105 106 103   CO2 21 23 22   BUN 19 19 19   CREATININE 1.63* 1.52* 1.56*   MAGNESIUM 1.7  --   --    PHOSPHORUS 2.7  --   --    CALCIUM 8.7 8.8 8.8     Recent Labs     05/04/21 0317 05/05/21 0020 05/06/21  0100   ALTSGPT 7 6  --    ASTSGOT 19 16  --    ALKPHOSPHAT 71 69  --    TBILIRUBIN 0.2 0.2  --    GLUCOSE 75 86 83     Recent Labs     05/04/21 0317 05/05/21 0020 05/06/21  0100   RBC 3.45* 3.39* 3.78*   HEMOGLOBIN 8.8* 8.5* 9.6*    HEMATOCRIT 27.8* 27.4* 30.0*   PLATELETCT 207 230 311   IRON  --   --  22*   TOTIRONBC  --   --  170*     Recent Labs     21  0020 21  0100   WBC 6.2   < > 4.4* 4.1* 6.1   NEUTSPOLYS 79.10*  --  76.10* 73.00*  --    LYMPHOCYTES 4.70*  --  6.80* 7.60*  --    MONOCYTES 12.50  --  13.70* 14.20*  --    EOSINOPHILS 2.90  --  2.70 4.20  --    BASOPHILS 0.50  --  0.50 0.50  --    ASTSGOT  --   --  19 16  --    ALTSGPT  --   --  7 6  --    ALKPHOSPHAT  --   --  71 69  --    TBILIRUBIN  --   --  0.2 0.2  --     < > = values in this interval not displayed.     Recent Labs     21  0020 21  0100   SODIUM 137 135 136   POTASSIUM 4.1 3.9 4.1   CHLORIDE 105 106 103   CO2 21 23 22   GLUCOSE 75 86 83   BUN 19 19 19   CREATININE 1.63* 1.52* 1.56*   CALCIUM 8.7 8.8 8.8     Hemodynamics:  Temp (24hrs), Av.9 °C (98.4 °F), Min:36.7 °C (98.1 °F), Max:37.2 °C (99 °F)  Temperature: 36.9 °C (98.4 °F)  Pulse  Av.4  Min: 61  Max: 91   Blood Pressure : 153/68     Respiratory:    Respiration: 17, Pulse Oximetry: 94 %        RLL Breath Sounds: Diminished, LLL Breath Sounds: Diminished  Fluids:    Intake/Output Summary (Last 24 hours) at 5/3/2021 1253  Last data filed at 2021 1545  Gross per 24 hour   Intake 240 ml   Output 200 ml   Net 40 ml        GI/Nutrition:  Orders Placed This Encounter   Procedures   • Diet Order Diet: Regular     Standing Status:   Standing     Number of Occurrences:   1     Order Specific Question:   Diet:     Answer:   Regular [1]     Medical Decision Making, by Problem:  Active Hospital Problems    Diagnosis    • *Retroperitoneal mass [R19.00]    • Hypercalcemia [E83.52]    • Acute kidney injury superimposed on CKD (HCC) [N17.9, N18.9]    • Hydronephrosis [N13.30]    • Pleural effusion on left [J90]    • Hypertension [I10]        Assessment and Plan:  71 y/o otherwise healthy man presented with pain found to have a very  large RP mass. This was biopsied on 4/29/21 and showed aggressive B-cell lymphoma, final path pending.    # Aggressive B-cell Lymphoma, FISH results pending - large RP mass on imaging causing renal dysfunction, s/p stent. Reviewed the diagnosis in detail with the patient and his wife.   - ECHO normal EF of 65%  - PET/CT scan, - pt declines even with sedation, will order CT scans to complete staging imaging, pt understands this is not the preferred staging evaluation for his lymphoma but adamantly declines a PET/CT scan.  - Bone marrow biopsy - completed on 5/3/21, normal prelim  - PICC line placement - completed  - Continue allopurinol  - CT chest and neck normal  - Discussed starting R-EPOCH tomorrow, FISH results likely not going to be back until early next week and discussed this all in detail with the patient and his wife. Discussed that if he is a double hit lymphoma will continue with R-EPOCH to complete the 6 cycles but if not, then can de-escalate with Cycle 2 to R-CHOP. He was in agreement with this plan and it was all reviewed again in detail to day with the patient, his wife, and friend  - Cycle 1 Day 1 R-EPOCH to start today, Rituximab to be given on Day 5 for cycle 1 due to risk of TLS.  - Decrease dose of etoposide by 25% due to renal dysfunction    # Acute Kidney Injury - 2/2 RP mass  - s/p stent placement, creatinine improving, monitor hopefully gets near normal prior to starting chemotherapy, this continues to improve    # Anemia, microcytic - Hemoglobin stable around 9  - monitor    # PPX: Start acyclovir he is on allopurinol  - monitor labs q 12 hours    Will Continue to follow   High Complexity, High risk patient  Quality-Core Measures

## 2021-05-06 NOTE — PROGRESS NOTES
"Patient Name: Khalif Kirkpatrick     Protocol: R-EPOCH       *Dosing Reference*  Rituximab 375 mg/m2 IV on    5/6/21 - Day 5 due to risk of TLS per Dr. Pond  Etoposide  IV continuous infusion over 24 hours daily on Days 1-4   5/6/21 - Dose reduced to 35 mg/m2 due to renal impairment  Doxorubicin 10 mg/m2 IV continuous infusion over 24 hours daily on Days 1-4  Vincristine 0.4 mg/m2 IV continuous infusion over 24 hour daily on Days 1-4  Cyclophosphamide 750 mg/m2 IV over 30 minutes on Day 5  Prednisone 60 mg/m2 PO twice daily on Days 1-5  21-day cycle for 6 cycles  Dose Adjustment based on twice weekly CBCs, at least 3 days apart:  Vimal measurements Dose-adjustment   If vimal ANC > 0.5 x 109/L Increase 1 dose level  in etoposide, doxorubicin, and cyclophosphamide above last cycle   If vimal ANC < 0.5 x 109/L on 1 or 2 measurements Same dose(s) as last cycle   If vimal ANC < 0.5 x 109/L on at least 3 measurements  Decrease 1 dose level in etoposide, doxorubicin, and cyclophosphamide below last cycle   OR     If vimal platelet count < 25 x 109/L on 1 measurement Decrease 1 dose level in etoposide, doxorubicin, and cyclophosphamide below last cycle   • Dose adjustments above starting dose level (level 1) apply to etoposide, doxorubicin and cyclophosphamide  • Dose adjustments below starting dose level (level 1) apply to cyclophosphamide only.   NCCN Guidelines for B-Cell Lymphomas V.1.2019.  Abimael HUSSEIN, et al. Blood. 2002;99(8):2685-93.  Abimael HUSSEIN, et al. J Clin Oncol. 2008;26(16):2717-24.    Dx: DLBCL         Allergies:  Ibuprofen     /68   Pulse 91   Temp 36.9 °C (98.4 °F) (Temporal)   Resp 17   Ht 1.753 m (5' 9\")   Wt 88 kg (194 lb)   SpO2 94%   BMI 28.65 kg/m²  Body surface area is 2.07 meters squared.     Labs 5/6/21  Plt = 311k   Hgb = 9.6     SCr = 1.56 mg/dL CrCl ~ 53.1 mL/min     Labs 5/5/21  ANC~ 2980 LFT's = WNLs  TBili = 0.2      5/1/2021 12:10   Hepatitis B Surface Antigen Non-Reactive   "   Hepatitis B Cors Ab,IgM Non-Reactive     5/2/21 LVEF - ECHO ~65%     Drug Order   (Drug name, dose, route, IV Fluid & volume, frequency, number of doses) Cycle: 1 Day 1 of 5      Previous treatment: N/a     Medication = Rituximab-pvvr  Base Dose= 375 mg/m2  Calc Dose: Base Dose x 2.05 m2 = 768.75 mg  Final Dose = 700 mg  Route = IV  Fluid & Volume =  mL  Admin Duration = See rate sheet   Day 5 only       <10% difference, okay to treat with final dose   Medication = Etoposide  Base Dose= 35 mg/m2  Calc Dose: Base Dose x 2.05 m2 = 71.75 mg  Final Dose = 71.8 mg  Route = IV  Fluid & Volume = NS 1000 mL  Admin Duration = Over 24 hours Admixed with doxorubicin and vincristine  Days 1-4       <10% difference, okay to treat with final dose   Medication = Doxorubicin  Base Dose= 10 mg/m2  Calc Dose:Base Dose x 2.05 m2 = 20.5 mg  Final Dose = 20.5 mg  Route = IV  Fluid & Volume = NS 1000 mL  Admin Duration = Over 24 hours   Admixed with etoposide and vincristine   Days 1-4      <10% difference, okay to treat with final dose   Medication = Vincristine  Base Dose= 0.4 mg/m2  Calc Dose: Base Dose x 2.05 m2 = 0.82 mg  Final Dose = 0.8 mg  Route = IV  Fluid & Volume = NS 1000 mL  Admin Duration = Over 24 hours   Admixed with etoposide and doxorubicin   Days 1-4      <10% difference, okay to treat with final dose   Medication = Cyclophosphamide  Base Dose= 750 mg/m2  Calc Dose: Base Dose x 2.05 m2 = 1537.5 mg  Final Dose = 1537.5 mg  Route = IV  Fluid & Volume =  mL  Admin Duration = Over 30 minutes   Day 5 only       <10% difference, okay to treat with final dose   By my signature below, I confirm this process was performed independently with the BSA and all final chemotherapy dosing calculations congruent. I have reviewed the above chemotherapy order and that my calculation of the final dose and BSA (when applicable) corroborate those calculations of the  pharmacist. Discrepancies of 10% or greater in  the written dose have been addressed and documented within the EPIC Progress notes.    Emeka Pinto, PharmD

## 2021-05-06 NOTE — PROGRESS NOTES
Chemotherapy Verification - SECONDARY RN  Cycle 1 Day 1       Height = 175.3 cm  Weight = 86.2 kg  BSA = 2.05 m2       Medication: Etoposide  Dose: 35 mg/m2  Calculated Dose: 71.75 mg (71.8 mg ordered                             (In mg/m2, AUC, mg/kg)     Medication: Doxorubicin  Dose: 10 mg/m2  Calculated Dose: 20.5 mg (20.5 mg ordered)                             (In mg/m2, AUC, mg/kg)    Medication: Vincristine  Dose: 0.4 mg/m2  Calculated Dose: 0.82 mg (0.8 mg ordered)                             (In mg/m2, AUC, mg/kg)      I confirm that this process was performed independently.

## 2021-05-06 NOTE — ASSESSMENT & PLAN NOTE
Completed Rituxan today.  To start Neulasta on 5/12/2021.  Start fluconazole, acyclovir, levofloxacin. Monitor labs.  Continue outpatient oncology follow-up

## 2021-05-06 NOTE — PROGRESS NOTES
Pharmacy Chemotherapy Verification  Patient Name: Khalif Kirkpatrick  Dx: B cell lymphoma  Cycle: 1, Days 1 to 5   Previous treatment = N/A    Regimen and Dosing Reference  R-EPOCH, dose adjusted  RiTUXimab-pvvr 375 mg/m2 D1   -To be given on Day 5 for Cycle 1  Etoposide 50 mg/m2/day CIVI over 24hrs days 1-4   -30% dose reduction to 35 mg/m2 for renal function starting Cycle 1 DOXOrubicin 10 mg/m2/day CIVI over 24hrs days 1-4  VinCRIStine 0.4 mg/m2/day CIVI over 24hrs days 1-4  Etoposide, DOXOrubicin and vinCRIStine are administered via continuous infusion over multiple days in the same infusion bag.  Cyclophosphamide 750 mg/m2 IV over 30 min on D5  Prednisone 60 mg/m2/dose PO divided BID on days 1-5  21-day cycle x 6 cycles    Dose adjustment based on twice weekly CBCs, at least 3 days apart:  Vimal measurements Dose-adjustment   If vimal ANC > 0.5 x 109/L Increase 1 dose level in etoposide, doxorubicin, and cyclophosphamide above last cycle   If vimal ANC < 0.5 x 109/L on 1 or 2 measurements maintain Same dose(s) as last cycle   If vimal ANC < 0.5 x 109/L on at least 3 measurements  Decrease 1 dose level in etoposide, doxorubicin, and cyclophosphamide below last cycle   OR     If vimal platelet count < 25 x 109/L on 1 measurement decrease 1 dose level in etoposide, doxorubicin, and cyclophosphamide below last cycle                  Drugs (mg/m2/day)           Drug doses per dose level        -2     -1     1*     2     3     4     5     6     DOXOrubicin   10 10 10 12 14.4 17.3 20.7 24.8     Etoposide 50 50 50 60 72 86.4 103.7 124.4     Cyclophosphamide 480 600  1296 1555 1866   *Starting dose level    • Dose adjustments above starting dose level (level 1) apply to etoposide, doxorubicin and cyclophosphamide  • Dose adjustments below starting dose level (level 1) apply to cyclophosphamide only.    NCCN Guidelines for B-cell lymphomas V.2.2017  JOVITA Varghese, et al -J Clin Oncol 2008;26(16):2717-24.  Kate  "N, et al - Blood (TEQUILA Annual Meeting Abstracts) 2009 114: Abstract 2701  Abimael WH,et al - Blood. 2002 Apr 15;99(8):2238-93.    Intrathecal Prophylaxis  Methotrexate 12 mg intrathecal on day 1   Frequency and duration are dependent on risk of patient and CNS status  NCCN Guidelines for B-Cell Lymphomas.V.2.2017  Felipe HT, et al. Meghna Oncol. 2007;18(3):541-5  Dunia CS, et al. Blood. 2010;116(21):abst 2804    Allergies:Ibuprofen  /77   Pulse 73   Temp 36.8 °C (98.2 °F) (Temporal)   Resp 18   Ht 1.753 m (5' 9\")   Wt 88 kg (194 lb)   SpO2 97%   BMI 28.65 kg/m²  Body surface area is 2.07 meters squared.    Labs 5/6/21  Hgb 9.6 Plt 311k  SCr 1.56  CrCl 53.1 mL/min   Uric acid 7.4      Labs 5/5/21  ANC 2980   AST/ALT/AP = 16/6/69 Tbili = 0.2    ECHO 5/2/21  LVEF 65%     5/1/2021 12:10   Hepatitis B Surface Antigen Non-Reactive   Hepatitis B Cors Ab,IgM Non-Reactive     Etoposide 35 mg/m2 x 2.05 m2 = 71.75 mg   Final dose = 71.8 mg/day CIVI days 1-4   Calculated dose within 10% of written dose   Admix with DOXOrubicin and vinCRIStine    DOXOrubicin 10 mg/m2 x 2.05 m2 = 20.5 mg    Final dose = 20.5 mg/day CIVI days 1-4   Calculated dose within 5% of written dose   Admix with etoposide and vinCRIStine    VinCRIStine 0.4 mg/m2  x 2.05 m2 = 0.82 mg   Final dose = 0.8 mg/day CIVI days 1-4   Calculated dose within 10% of written dose   Admix with etoposide and DOXOrubicin    Cyclophosphamide 750 mg/m2 x 2.05 m2 = 1537.5 mg    Final dose = 1537.6 mg IV D5    Calculated dose within 10% of written dose    RiTUXimab-pvvr 375 mg/m2  x 2.05 m2 = 768.75 mg    Final dose = 700 mg IV on D5    Calculated dose within 10% of written dose    Maureen Dee, PharmD, BCOP              "

## 2021-05-06 NOTE — PROGRESS NOTES
Chemotherapy Verification - PRIMARY RN  Cycle 1 Day 1    Height = 175.3 cm  Weight = 86.2 kg  BSA = 2.05 m2       Medication: Etoposide  Dose: 35 mg / m2  Calculated Dose: 71.75 mg, Order dose 71.8 mg                             (In mg/m2, AUC, mg/kg)     Medication: Doxorubicin  Dose: 10 mg / m2  Calculated Dose: 20.5 mg Order dose 20.5 mg                             (In mg/m2, AUC, mg/kg)    Medication: Vincristine  Dose: 0.4 mg/m2  Calculated Dose: 0.82 mg, Order dose 0.8 mg                             (In mg/m2, AUC, mg/kg)      I confirm this process was performed independently with the BSA and all final chemotherapy dosing calculations congruent.  Any discrepancies of 10% or greater have been addressed with the chemotherapy pharmacist. The resolution of the discrepancy has been documented in the EPIC progress notes.

## 2021-05-07 LAB
ANION GAP SERPL CALC-SCNC: 13 MMOL/L (ref 7–16)
BUN SERPL-MCNC: 20 MG/DL (ref 8–22)
CALCIUM SERPL-MCNC: 8.5 MG/DL (ref 8.5–10.5)
CHLORIDE SERPL-SCNC: 101 MMOL/L (ref 96–112)
CO2 SERPL-SCNC: 21 MMOL/L (ref 20–33)
CREAT SERPL-MCNC: 1.54 MG/DL (ref 0.5–1.4)
ERYTHROCYTE [DISTWIDTH] IN BLOOD BY AUTOMATED COUNT: 42.7 FL (ref 35.9–50)
GLUCOSE SERPL-MCNC: 149 MG/DL (ref 65–99)
HCT VFR BLD AUTO: 28.4 % (ref 42–52)
HGB BLD-MCNC: 9 G/DL (ref 14–18)
LDH SERPL L TO P-CCNC: 209 U/L (ref 107–266)
MCH RBC QN AUTO: 25.4 PG (ref 27–33)
MCHC RBC AUTO-ENTMCNC: 31.7 G/DL (ref 33.7–35.3)
MCV RBC AUTO: 80 FL (ref 81.4–97.8)
PHOSPHATE SERPL-MCNC: 3.9 MG/DL (ref 2.5–4.5)
PLATELET # BLD AUTO: 220 K/UL (ref 164–446)
PMV BLD AUTO: 10.2 FL (ref 9–12.9)
POTASSIUM SERPL-SCNC: 4.4 MMOL/L (ref 3.6–5.5)
RBC # BLD AUTO: 3.55 M/UL (ref 4.7–6.1)
SODIUM SERPL-SCNC: 135 MMOL/L (ref 135–145)
URATE SERPL-MCNC: 6.8 MG/DL (ref 2.5–8.3)
WBC # BLD AUTO: 1.9 K/UL (ref 4.8–10.8)

## 2021-05-07 PROCEDURE — 700111 HCHG RX REV CODE 636 W/ 250 OVERRIDE (IP): Performed by: STUDENT IN AN ORGANIZED HEALTH CARE EDUCATION/TRAINING PROGRAM

## 2021-05-07 PROCEDURE — 80048 BASIC METABOLIC PNL TOTAL CA: CPT

## 2021-05-07 PROCEDURE — A9270 NON-COVERED ITEM OR SERVICE: HCPCS | Performed by: STUDENT IN AN ORGANIZED HEALTH CARE EDUCATION/TRAINING PROGRAM

## 2021-05-07 PROCEDURE — 84100 ASSAY OF PHOSPHORUS: CPT

## 2021-05-07 PROCEDURE — 84550 ASSAY OF BLOOD/URIC ACID: CPT

## 2021-05-07 PROCEDURE — 99233 SBSQ HOSP IP/OBS HIGH 50: CPT | Performed by: NURSE PRACTITIONER

## 2021-05-07 PROCEDURE — 83615 LACTATE (LD) (LDH) ENZYME: CPT

## 2021-05-07 PROCEDURE — A9270 NON-COVERED ITEM OR SERVICE: HCPCS | Performed by: INTERNAL MEDICINE

## 2021-05-07 PROCEDURE — 700102 HCHG RX REV CODE 250 W/ 637 OVERRIDE(OP): Performed by: INTERNAL MEDICINE

## 2021-05-07 PROCEDURE — 700102 HCHG RX REV CODE 250 W/ 637 OVERRIDE(OP): Performed by: STUDENT IN AN ORGANIZED HEALTH CARE EDUCATION/TRAINING PROGRAM

## 2021-05-07 PROCEDURE — 770004 HCHG ROOM/CARE - ONCOLOGY PRIVATE *

## 2021-05-07 PROCEDURE — 85027 COMPLETE CBC AUTOMATED: CPT

## 2021-05-07 PROCEDURE — 700105 HCHG RX REV CODE 258: Performed by: INTERNAL MEDICINE

## 2021-05-07 PROCEDURE — 700111 HCHG RX REV CODE 636 W/ 250 OVERRIDE (IP): Performed by: INTERNAL MEDICINE

## 2021-05-07 RX ORDER — ONDANSETRON 2 MG/ML
8 INJECTION INTRAMUSCULAR; INTRAVENOUS ONCE
Status: COMPLETED | OUTPATIENT
Start: 2021-05-07 | End: 2021-05-07

## 2021-05-07 RX ADMIN — LORAZEPAM 1 MG: 1 TABLET ORAL at 21:16

## 2021-05-07 RX ADMIN — FAMOTIDINE 20 MG: 20 TABLET ORAL at 17:49

## 2021-05-07 RX ADMIN — ONDANSETRON 8 MG: 2 INJECTION INTRAMUSCULAR; INTRAVENOUS at 17:49

## 2021-05-07 RX ADMIN — PREDNISONE 120 MG: 20 TABLET ORAL at 17:49

## 2021-05-07 RX ADMIN — DOCUSATE SODIUM 50 MG AND SENNOSIDES 8.6 MG 2 TABLET: 8.6; 5 TABLET, FILM COATED ORAL at 04:59

## 2021-05-07 RX ADMIN — OXYCODONE 5 MG: 5 TABLET ORAL at 21:16

## 2021-05-07 RX ADMIN — ACYCLOVIR 400 MG: 800 TABLET ORAL at 17:49

## 2021-05-07 RX ADMIN — FAMOTIDINE 20 MG: 20 TABLET ORAL at 04:59

## 2021-05-07 RX ADMIN — AMLODIPINE BESYLATE 10 MG: 10 TABLET ORAL at 04:59

## 2021-05-07 RX ADMIN — ATENOLOL 50 MG: 50 TABLET ORAL at 09:47

## 2021-05-07 RX ADMIN — PREDNISONE 120 MG: 20 TABLET ORAL at 04:59

## 2021-05-07 RX ADMIN — ENOXAPARIN SODIUM 40 MG: 40 INJECTION SUBCUTANEOUS at 04:59

## 2021-05-07 RX ADMIN — OMEPRAZOLE 20 MG: 20 CAPSULE, DELAYED RELEASE ORAL at 17:08

## 2021-05-07 RX ADMIN — VINCRISTINE SULFATE: 1 INJECTION, SOLUTION INTRAVENOUS at 18:25

## 2021-05-07 RX ADMIN — ALLOPURINOL 300 MG: 300 TABLET ORAL at 04:59

## 2021-05-07 RX ADMIN — ACYCLOVIR 400 MG: 800 TABLET ORAL at 04:59

## 2021-05-07 ASSESSMENT — ENCOUNTER SYMPTOMS
FOCAL WEAKNESS: 0
WHEEZING: 0
NERVOUS/ANXIOUS: 0
BLOOD IN STOOL: 0
WEIGHT LOSS: 0
COUGH: 0
MYALGIAS: 0
BACK PAIN: 0
VOMITING: 0
INSOMNIA: 0
DIZZINESS: 0
FEVER: 0
DIARRHEA: 0
ABDOMINAL PAIN: 0
NAUSEA: 0
PALPITATIONS: 0
SHORTNESS OF BREATH: 0
WEAKNESS: 0
CHILLS: 0
DEPRESSION: 0
CONSTIPATION: 0
HEADACHES: 0

## 2021-05-07 NOTE — DISCHARGE PLANNING
Care Transition Team Assessment    Per Md's Notes:  69 yo male admitted on 4/27/2021 with weakness and back pain. He is a transfer from [facility]. Patient had IR biopsy done on 4/29/21, Pathology preliminary results aggressive B-cell lymphoma, Oncology has been consulted, urology consulted for hydronephrosis. Patient is status post cystoscopy with ureteral stent insertion.     Anticipated Discharge Disposition: Home with his spouse, Stormy Kirkpatrick when chemotherapy treatment is completed. Anticipate Tuesday, 5/11/2021.     Action: This RN,  completed an initial assessment with patient at bedside. Patient was A&O x4, stated [place], [place] Patient lives in a 2 story home, bedroom is on the 1st floor. Patient denies DME, oxygen, home health, and SNF services. PCP: Dr. Nikko Singh. Rx: [facility].     Patient was discussed at IDT rounds, discharge is anticipated for Tuesday, patient will be receiving Rutixab late Monday evening. Team is aware.     Barriers to Discharge: Receiving chemotherapy for Large B-cell lymphoma.    Plan: Hospital Case Management available for any discharge needs or concerns, none anticipated on discharge.       Information Source  Orientation Level: Oriented X4  Information Given By: Patient  Informant's Name: (Khalif Kirkpatrick)  Who is responsible for making decisions for patient? : Patient    Readmission Evaluation  Is this a readmission?: No    Elopement Risk  Legal Hold: No  Ambulatory or Self Mobile in Wheelchair: Yes  Disoriented: No  Psychiatric Symptoms: None  History of Wandering: No  Elopement this Admit: No  Vocalizing Wanting to Leave: No  Displays Behaviors, Body Language Wanting to Leave: No-Not at Risk for Elopement  Elopement Risk: Not at Risk for Elopement    Interdisciplinary Discharge Planning  Patient or legal guardian wants to designate a caregiver: No               Finances  Financial Barriers to Discharge: No  Prescription Coverage: Yes    Vision / Hearing Impairment  Vision Impairment : No  Hearing Impairment : Yes  Hearing Impairment: Both Ears, Hearing Device(s) Available  Does Pt Need Special Equipment for the Hearing Impaired?: No         Advance Directive  Advance Directive?: DPOA for Health Care  Durable Power of  Name and Contact : (Spouse, Stormy Kirkpatrick)    Domestic Abuse  Have you ever been the victim of abuse or violence?: No  Physical Abuse or Sexual Abuse: No  Verbal Abuse or Emotional Abuse: No  Possible Abuse/Neglect Reported to:: Not Applicable         Discharge Risks or Barriers  Discharge risks or barriers?: No  Patient risk factors: Complex medical needs, Vulnerable adult    Anticipated Discharge Information  Discharge Disposition: Still a Patient (30)  Discharge Address: ([address] [address] [place] [address])  Discharge Contact Phone Number: ([phone])

## 2021-05-07 NOTE — PROGRESS NOTES
[facility] Medicine Daily Progress Note    Date of Service  5/7/2021    Chief Complaint  70 y.o. male admitted 4/27/2021 with weakness and back pain     Hospital Course  70 y.o. male with past medical history of hypertension on atenolol, CKD who presented 4/27/2021 as a transfer from [facility] for abnormal labs by PMD. Patient sates that he has been having back pain, low grade fevers, night sweats, and weight loss that started in November 2020. He characterizes his low back pain as migratory and dull, 6/10 in intensity, not relieved with anything exacerbated with movement. His PMD is Dr. Singh. On admission he had a calcium level of 14 and was given IVF and IV 4 mg zolendronic acid on admission. Repeat CT abdomen pelvis done without contrast due to patient's kidney function showed a large retroperitoneal mass surrounding the left kidney and obstructing the left ureter, mass also surrounds the aorta superior mesenteric artery and vein. Urology consulted for hydronephrosis.  S/p cystoscopy with ureteral stent insertion. IR biopsy done 4/29.  Pathology preliminary results aggressive B-cell lymphoma, oncology has been consulted.      Interval Problem Update  Cr stable . He denies any pain and tolerating R-EPOCH well.     Consultants/Specialty  Urology   IR  Oncology   Palliative Care     Code Status  Full Code    Disposition  Continue inpatient for R-EPOCH. Can likely discharge home after     Review of Systems  Review of Systems   Constitutional: Positive for malaise/fatigue. Negative for chills, fever and weight loss.   Respiratory: Negative for cough, shortness of breath and wheezing.    Cardiovascular: Negative for chest pain, palpitations and leg swelling.   Gastrointestinal: Negative for abdominal pain, blood in stool, constipation, diarrhea, nausea and vomiting.   Genitourinary: Negative for dysuria, frequency, hematuria and urgency.   Musculoskeletal: Negative for back pain and myalgias.   Skin: Negative for  rash.   Neurological: Negative for dizziness, focal weakness, weakness and headaches.   Psychiatric/Behavioral: Negative for depression. The patient is not nervous/anxious and does not have insomnia.    All other systems reviewed and are negative.       Physical Exam  Temp:  [36.8 °C (98.2 °F)-37.1 °C (98.8 °F)] 37 °C (98.6 °F)  Pulse:  [78-96] 80  Resp:  [17-18] 18  BP: (136-159)/(64-80) 136/64  SpO2:  [94 %-98 %] 95 %    Physical Exam  Vitals and nursing note reviewed.   Constitutional:       General: He is not in acute distress.     Appearance: He is overweight. He is not ill-appearing.   HENT:      Head: Normocephalic and atraumatic.      Right Ear: External ear normal.      Left Ear: External ear normal.      Nose: No congestion or rhinorrhea.      Mouth/Throat:      Mouth: Mucous membranes are moist.      Pharynx: Oropharynx is clear.   Eyes:      General: No scleral icterus.        Right eye: No discharge.         Left eye: No discharge.      Conjunctiva/sclera: Conjunctivae normal.   Cardiovascular:      Rate and Rhythm: Normal rate and regular rhythm.   Pulmonary:      Effort: Pulmonary effort is normal. No respiratory distress.      Breath sounds: Normal breath sounds. No wheezing.   Abdominal:      General: Abdomen is flat. There is no distension.      Palpations: Abdomen is soft.      Tenderness: There is no abdominal tenderness.   Musculoskeletal:         General: No swelling. Normal range of motion.      Cervical back: Normal range of motion and neck supple. No rigidity.   Skin:     General: Skin is warm and dry.      Coloration: Skin is pale.   Neurological:      Mental Status: He is alert, oriented to person, place, and time and easily aroused. Mental status is at baseline.   Psychiatric:         Attention and Perception: Attention normal.         Mood and Affect: Mood normal.         Speech: Speech normal.         Behavior: Behavior normal. Behavior is cooperative.         Cognition and Memory:  Cognition normal.         Fluids    Intake/Output Summary (Last 24 hours) at 5/7/2021 1011  Last data filed at 5/7/2021 0951  Gross per 24 hour   Intake --   Output 950 ml   Net -950 ml       Laboratory  Recent Labs     05/05/21  0020 05/06/21  0100 05/07/21  0206   WBC 4.1* 6.1 1.9*   RBC 3.39* 3.78* 3.55*   HEMOGLOBIN 8.5* 9.6* 9.0*   HEMATOCRIT 27.4* 30.0* 28.4*   MCV 80.8* 79.4* 80.0*   MCH 25.1* 25.4* 25.4*   MCHC 31.0* 32.0* 31.7*   RDW 43.8 43.4 42.7   PLATELETCT 230 311 220   MPV 10.5 9.9 10.2     Recent Labs     05/05/21  0020 05/06/21  0100 05/07/21  0206   SODIUM 135 136 135   POTASSIUM 3.9 4.1 4.4   CHLORIDE 106 103 101   CO2 23 22 21   GLUCOSE 86 83 149*   BUN 19 19 20   CREATININE 1.52* 1.56* 1.54*   CALCIUM 8.8 8.8 8.5          Lab Results   Component Value Date/Time    HEPAIGM Non-Reactive 05/01/2021 1210    HEPBSAG Non-Reactive 05/01/2021 1210    HEPBCORIGM Non-Reactive 05/01/2021 1210    HEPCAB Non-Reactive 05/01/2021 1210     Results from last 7 days   Lab Units 05/07/21  0206 05/06/21  0100 05/05/21  0020 05/04/21  0317 05/03/21  0033 05/02/21  0304 05/01/21  1210   URIC ACID 107 mg/dL 6.8 7.4 7.4 7.5 7.5 7.7 7.8     Results from last 7 days   Lab Units 05/07/21  0206 05/01/21  1210   LDH TOTAL 115 U/L 209 157                 Imaging  CT-CHEST (THORAX) W/O   Final Result         1.  Large confluent presumed herb mass in the retroperitoneum and upper abdomen, and contiguous with retrocrural mass in keeping with biopsy-proven lymphoma.   2.  Small to moderate left and small right pleural effusions.               CT-SOFT TISSUE NECK W/O   Final Result      1.  No significant neck mass or adenopathy is seen on this noncontrast study.   2.  Bilateral pleural effusions.      IR-PICC LINE PLACEMENT W/ GUIDANCE > AGE 5   Final Result                  Ultrasound-guided PICC placement performed by qualified nursing staff as    above.          EC-ECHOCARDIOGRAM COMPLETE W/O CONT   Final Result       CT-NEEDLE BX-ABD-RETROPERITONL   Final Result      1.  CT GUIDED RIGHT RETROPERITONEAL BIOPSY. MASSIVE RIGHT-SIDED PARA-AORTIC ADENOPATHY WAS TARGETED.      DX-CYSTO FLUORO > 1 HOUR   Final Result      Cysto fluoroscopy utilized for 36 seconds         INTERPRETING LOCATION: [address], [place], [address]      CT-ABDOMEN-PELVIS W/O   Final Result      1.  Very large retroperitoneal mass surrounding LEFT kidney and obstructing LEFT ureter.  Mass also surrounds the aorta, superior mesenteric artery and vein.   2.  Nonobstructing RIGHT kidney stone.   3.  Minimal ascites.   4.  Evaluation is limited due to lack of IV contrast.      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)  Assessment & Plan  Started R-EPOCH 5/6, continue   Monitor for tumor lysis syndrome with Q12 BMP, phos, LDH, uric acid   Monitor renal function closely  Symptom management     Retroperitoneal mass  Assessment & Plan  Prelim pathology positive for aggressive B-cell lymphoma, pending final results   s/p biopsy   -echo: 65% EF  -bone marrow bx completed, results pending  -uric acid, LDH within normal limits, hepatitis, and HIV negative  -back pain management  -PICC line in place       Acute kidney injury superimposed on CKD (HCC)  Assessment & Plan  Probably secondary to left sided hydronephrosis causing post obstruction failure  S/p ureteral stent   Monitor Cr closely during chemotherapy   Maintain hydration    Anemia of chronic disease- (present on admission)  Assessment & Plan  H/H is stable   Monitor with CBC   Transfuse for Hgb < 7     Hypertension  Assessment & Plan  Continue with atenolol and amlodipine     Hydronephrosis  Assessment & Plan  From mass causing obstruction.  s/p cystoscopy and ureteral stent placement 4/28   improving         VTE prophylaxis: Lovenox     I have performed a  physical exam and reviewed and updated ROS and Plan today (5/7/2021). In review of yesterday's note (5/6/2021), there are no changes except as documented above.     SHEREE Coles.

## 2021-05-07 NOTE — PROGRESS NOTES
Oncology/Hematology Progress Note               Author: Veronica Pond M.D. Date & Time created: 5/7/2021  1:18 PM     Interval History:  No acute events overnight. He is tolerating chemotherapy well. Back pain controlled. Has fatigue. Looking forward to going home.     Review of Systems:  Review of Systems   All other systems reviewed and are negative.      Physical Exam:  Physical Exam  Constitutional:       Appearance: Normal appearance. He is not ill-appearing.   HENT:      Head: Normocephalic and atraumatic.      Right Ear: External ear normal.      Left Ear: External ear normal.      Nose: Nose normal.      Mouth/Throat:      Pharynx: No oropharyngeal exudate.   Eyes:      General: No scleral icterus.     Conjunctiva/sclera: Conjunctivae normal.   Cardiovascular:      Rate and Rhythm: Normal rate and regular rhythm.      Heart sounds: No murmur.   Pulmonary:      Effort: Pulmonary effort is normal. No respiratory distress.      Breath sounds: Normal breath sounds.   Abdominal:      General: Abdomen is flat. Bowel sounds are normal. There is no distension.      Palpations: Abdomen is soft.      Tenderness: There is no abdominal tenderness.   Musculoskeletal:         General: No swelling or tenderness.      Cervical back: Neck supple.   Skin:     General: Skin is warm and dry.   Neurological:      General: No focal deficit present.      Mental Status: He is alert and oriented to person, place, and time.   Psychiatric:         Mood and Affect: Mood normal.         Behavior: Behavior normal.         Thought Content: Thought content normal.         Judgment: Judgment normal.         Labs:          Recent Labs     05/05/21  0020 05/06/21 0100 05/07/21  0206   SODIUM 135 136 135   POTASSIUM 3.9 4.1 4.4   CHLORIDE 106 103 101   CO2 23 22 21   BUN 19 19 20   CREATININE 1.52* 1.56* 1.54*   PHOSPHORUS  --   --  3.9   CALCIUM 8.8 8.8 8.5     Recent Labs     05/05/21  0020 05/06/21  0100 05/07/21  0206   ALTSGPT 6  --    --    ASTSGOT 16  --   --    ALKPHOSPHAT 69  --   --    TBILIRUBIN 0.2  --   --    GLUCOSE 86 83 149*     Recent Labs     21  0206   RBC 3.39* 3.78* 3.55*   HEMOGLOBIN 8.5* 9.6* 9.0*   HEMATOCRIT 27.4* 30.0* 28.4*   PLATELETCT 230 311 220   IRON  --  22*  --    TOTIRONBC  --  170*  --      Recent Labs     21  0206   WBC 4.1* 6.1 1.9*   NEUTSPOLYS 73.00*  --   --    LYMPHOCYTES 7.60*  --   --    MONOCYTES 14.20*  --   --    EOSINOPHILS 4.20  --   --    BASOPHILS 0.50  --   --    ASTSGOT 16  --   --    ALTSGPT 6  --   --    ALKPHOSPHAT 69  --   --    TBILIRUBIN 0.2  --   --      Recent Labs     21  0206   SODIUM 135 136 135   POTASSIUM 3.9 4.1 4.4   CHLORIDE 106 103 101   CO2 23 22 21   GLUCOSE 86 83 149*   BUN 19 19 20   CREATININE 1.52* 1.56* 1.54*   CALCIUM 8.8 8.8 8.5     Hemodynamics:  Temp (24hrs), Av.9 °C (98.5 °F), Min:36.8 °C (98.2 °F), Max:37.1 °C (98.8 °F)  Temperature: 37.1 °C (98.8 °F)  Pulse  Av.1  Min: 61  Max: 96   Blood Pressure : 150/76     Respiratory:    Respiration: 18, Pulse Oximetry: 97 %        RLL Breath Sounds: Diminished, LLL Breath Sounds: Diminished  Fluids:    Intake/Output Summary (Last 24 hours) at 5/3/2021 1253  Last data filed at 2021 1545  Gross per 24 hour   Intake 240 ml   Output 200 ml   Net 40 ml     Weight: 86.2 kg (190 lb 0.6 oz)  GI/Nutrition:  Orders Placed This Encounter   Procedures   • Diet Order Diet: Regular     Standing Status:   Standing     Number of Occurrences:   1     Order Specific Question:   Diet:     Answer:   Regular [1]     Medical Decision Making, by Problem:  Active Hospital Problems    Diagnosis    • *Retroperitoneal mass [R19.00]    • Hypercalcemia [E83.52]    • Acute kidney injury superimposed on CKD (HCC) [N17.9, N18.9]    • Hydronephrosis [N13.30]    • Pleural effusion on left [J90]    • Hypertension [I10]        Assessment and  Plan:  69 y/o otherwise healthy man presented with pain found to have a very large RP mass. This was biopsied on 4/29/21 and showed aggressive B-cell lymphoma, final path pending.    # Aggressive B-cell Lymphoma (CD20 positive), FISH results pending - large RP mass on imaging causing renal dysfunction, s/p stent. Reviewed the diagnosis in detail with the patient and his wife.   - ECHO normal EF of 65%  - PET/CT scan, - pt declines even with sedation, will order CT scans to complete staging imaging, pt understands this is not the preferred staging evaluation for his lymphoma but adamantly declines a PET/CT scan.  - Bone marrow biopsy - completed on 5/3/21, normal prelim  - PICC line placement - completed  - Continue allopurinol  - CT chest and neck normal  - Cycle 1 Day 2 R-EPOCH to start today, Rituximab to be given on Day 5 for cycle 1 due to risk of TLS.  - Decrease dose of etoposide by 25% due to renal dysfunction  - tolerating chemotherapy as expected, continue, SE reviewed, pt in agreement    # Acute Kidney Injury - 2/2 RP mass  - s/p stent placement, creatinine improving, monitor hopefully gets near normal prior to starting chemotherapy, this continues to improve    # Anemia, microcytic - Hemoglobin stable around 9  - monitor    # PPX: acyclovir and allopurinol  - monitor labs q 12 hours    Will Continue to follow   High Complexity, High risk patient  Quality-Core Measures

## 2021-05-07 NOTE — PROGRESS NOTES
Patient is doing well with the chemo infusing. Patient doing well with the saline mouth rinses. Review POC with patient. Call light within reach. Will continue to monitor.

## 2021-05-07 NOTE — CARE PLAN
Problem: Communication  Goal: The ability to communicate needs accurately and effectively will improve  Outcome: PROGRESSING AS EXPECTED  Note: A&Ox4. Communicates needs and calls appropriately.     Problem: Knowledge Deficit  Goal: Knowledge of disease process/condition, treatment plan, diagnostic tests, and medications will improve  Outcome: PROGRESSING AS EXPECTED  Note: D1 of chemo started today.

## 2021-05-07 NOTE — PROGRESS NOTES
Palliative Care Advance Care Planning Progress Note    General: Khalif Kirkpatrick is a 70-year-old male answered from [facility] after being sent from his primary care physician for abnormal labs. He was found to have a large retroperitoneal mass which was biopsied 4/29/2021 and showed aggressive B-cell lymphoma. Bone marrow biopsy 5/3 preliminary results are normal. Patient starting chemotherapy today R-EPOCH under the care of Dr. Pond. Patient was originally seen 5/2/2021 for advance care planning.    Discussion: Patient up brushing teeth, wife at bedside and answered most of questions during visit. Patient is awaiting start of chemotherapy. He is hopeful to do well in discharge Tuesday. He expressed some distress over losing his hair in his beard. His wife shared photos of them together which she has brought in. She confirmed she brought in patient's healthcare directive and should be scanned into record which was confirmed.  To locate the AD, please hover the cursor over the patient's code status to find all linked ACP documents. Patient/wife denied having any questions or needs at this time.    Provided therapeutic communication including open ended questions, therapeutic presence, reflective listening. Palliative care contact information on communication board and encouraged patient/wife to call with any questions or needs.     Outcome: Full code/full treat.  Advance directive obtained and now available in EMR.     Plan: Chemotherapy to be initiated later today.     Please call our team with questions and/or additional needs.    Total visit time was 17 minutes discussing advance care planning.    SHEREE Rajput.  Palliative Care Nurse Practitioner  [phone]

## 2021-05-07 NOTE — PROGRESS NOTES
Chemotherapy Verification - PRIMARY RN  Cycle 1 Day 2  Height = 175.3 cm  Weight = 86.2 kg  BSA = 2.05 m2       Medication: Etoposide  Dose: 35 mg / m2  Calculated Dose: 71.75, Order dose 71.8 mg                             (In mg/m2, AUC, mg/kg)     Medication: Doxorubicin  Dose: 10 mg / m2  Calculated Dose: 20.5 mg, Order dose 20.5 mg                             (In mg/m2, AUC, mg/kg)    Medication: Vincristine  Dose: 0.4 mg / m2  Calculated Dose: 0.82mg, Order dose 0.8 mg                             (In mg/m2, AUC, mg/kg)      I confirm this process was performed independently with the BSA and all final chemotherapy dosing calculations congruent.  Any discrepancies of 10% or greater have been addressed with the chemotherapy pharmacist. The resolution of the discrepancy has been documented in the EPIC progress notes.

## 2021-05-08 LAB
ANION GAP SERPL CALC-SCNC: 10 MMOL/L (ref 7–16)
ANION GAP SERPL CALC-SCNC: 11 MMOL/L (ref 7–16)
BUN SERPL-MCNC: 21 MG/DL (ref 8–22)
BUN SERPL-MCNC: 22 MG/DL (ref 8–22)
CALCIUM SERPL-MCNC: 8.5 MG/DL (ref 8.5–10.5)
CALCIUM SERPL-MCNC: 8.6 MG/DL (ref 8.5–10.5)
CHLORIDE SERPL-SCNC: 106 MMOL/L (ref 96–112)
CHLORIDE SERPL-SCNC: 106 MMOL/L (ref 96–112)
CO2 SERPL-SCNC: 21 MMOL/L (ref 20–33)
CO2 SERPL-SCNC: 23 MMOL/L (ref 20–33)
CREAT SERPL-MCNC: 1.38 MG/DL (ref 0.5–1.4)
CREAT SERPL-MCNC: 1.42 MG/DL (ref 0.5–1.4)
ERYTHROCYTE [DISTWIDTH] IN BLOOD BY AUTOMATED COUNT: 42.1 FL (ref 35.9–50)
GLUCOSE SERPL-MCNC: 154 MG/DL (ref 65–99)
GLUCOSE SERPL-MCNC: 155 MG/DL (ref 65–99)
HCT VFR BLD AUTO: 28.3 % (ref 42–52)
HGB BLD-MCNC: 9 G/DL (ref 14–18)
LDH SERPL L TO P-CCNC: 179 U/L (ref 107–266)
LDH SERPL L TO P-CCNC: 196 U/L (ref 107–266)
MCH RBC QN AUTO: 25.3 PG (ref 27–33)
MCHC RBC AUTO-ENTMCNC: 31.8 G/DL (ref 33.7–35.3)
MCV RBC AUTO: 79.5 FL (ref 81.4–97.8)
PHOSPHATE SERPL-MCNC: 2.6 MG/DL (ref 2.5–4.5)
PHOSPHATE SERPL-MCNC: 3.2 MG/DL (ref 2.5–4.5)
PLATELET # BLD AUTO: 254 K/UL (ref 164–446)
PMV BLD AUTO: 10.3 FL (ref 9–12.9)
POTASSIUM SERPL-SCNC: 3.5 MMOL/L (ref 3.6–5.5)
POTASSIUM SERPL-SCNC: 4 MMOL/L (ref 3.6–5.5)
RBC # BLD AUTO: 3.56 M/UL (ref 4.7–6.1)
SODIUM SERPL-SCNC: 138 MMOL/L (ref 135–145)
SODIUM SERPL-SCNC: 139 MMOL/L (ref 135–145)
URATE SERPL-MCNC: 5.2 MG/DL (ref 2.5–8.3)
URATE SERPL-MCNC: 6.1 MG/DL (ref 2.5–8.3)
WBC # BLD AUTO: 4.9 K/UL (ref 4.8–10.8)

## 2021-05-08 PROCEDURE — 99233 SBSQ HOSP IP/OBS HIGH 50: CPT | Performed by: NURSE PRACTITIONER

## 2021-05-08 PROCEDURE — 700102 HCHG RX REV CODE 250 W/ 637 OVERRIDE(OP): Performed by: INTERNAL MEDICINE

## 2021-05-08 PROCEDURE — 83615 LACTATE (LD) (LDH) ENZYME: CPT

## 2021-05-08 PROCEDURE — 700102 HCHG RX REV CODE 250 W/ 637 OVERRIDE(OP): Performed by: NURSE PRACTITIONER

## 2021-05-08 PROCEDURE — 700105 HCHG RX REV CODE 258: Performed by: INTERNAL MEDICINE

## 2021-05-08 PROCEDURE — 84550 ASSAY OF BLOOD/URIC ACID: CPT | Mod: 91

## 2021-05-08 PROCEDURE — A9270 NON-COVERED ITEM OR SERVICE: HCPCS | Performed by: NURSE PRACTITIONER

## 2021-05-08 PROCEDURE — A9270 NON-COVERED ITEM OR SERVICE: HCPCS | Performed by: INTERNAL MEDICINE

## 2021-05-08 PROCEDURE — 700102 HCHG RX REV CODE 250 W/ 637 OVERRIDE(OP): Performed by: STUDENT IN AN ORGANIZED HEALTH CARE EDUCATION/TRAINING PROGRAM

## 2021-05-08 PROCEDURE — 80048 BASIC METABOLIC PNL TOTAL CA: CPT

## 2021-05-08 PROCEDURE — 85027 COMPLETE CBC AUTOMATED: CPT

## 2021-05-08 PROCEDURE — 770004 HCHG ROOM/CARE - ONCOLOGY PRIVATE *

## 2021-05-08 PROCEDURE — 700111 HCHG RX REV CODE 636 W/ 250 OVERRIDE (IP): Performed by: INTERNAL MEDICINE

## 2021-05-08 PROCEDURE — 700111 HCHG RX REV CODE 636 W/ 250 OVERRIDE (IP): Performed by: STUDENT IN AN ORGANIZED HEALTH CARE EDUCATION/TRAINING PROGRAM

## 2021-05-08 PROCEDURE — A9270 NON-COVERED ITEM OR SERVICE: HCPCS | Performed by: STUDENT IN AN ORGANIZED HEALTH CARE EDUCATION/TRAINING PROGRAM

## 2021-05-08 PROCEDURE — 84100 ASSAY OF PHOSPHORUS: CPT

## 2021-05-08 RX ORDER — POTASSIUM CHLORIDE 20 MEQ/1
20 TABLET, EXTENDED RELEASE ORAL ONCE
Status: COMPLETED | OUTPATIENT
Start: 2021-05-08 | End: 2021-05-08

## 2021-05-08 RX ORDER — ONDANSETRON 2 MG/ML
8 INJECTION INTRAMUSCULAR; INTRAVENOUS ONCE
Status: COMPLETED | OUTPATIENT
Start: 2021-05-08 | End: 2021-05-08

## 2021-05-08 RX ORDER — CARBOXYMETHYLCELLULOSE SODIUM 5 MG/ML
1 SOLUTION/ DROPS OPHTHALMIC PRN
Status: DISCONTINUED | OUTPATIENT
Start: 2021-05-08 | End: 2021-05-12 | Stop reason: HOSPADM

## 2021-05-08 RX ADMIN — OXYCODONE 5 MG: 5 TABLET ORAL at 20:57

## 2021-05-08 RX ADMIN — DOCUSATE SODIUM 50 MG AND SENNOSIDES 8.6 MG 2 TABLET: 8.6; 5 TABLET, FILM COATED ORAL at 18:03

## 2021-05-08 RX ADMIN — ATENOLOL 50 MG: 50 TABLET ORAL at 09:32

## 2021-05-08 RX ADMIN — FAMOTIDINE 20 MG: 20 TABLET ORAL at 04:49

## 2021-05-08 RX ADMIN — FAMOTIDINE 20 MG: 20 TABLET ORAL at 18:03

## 2021-05-08 RX ADMIN — AMLODIPINE BESYLATE 10 MG: 10 TABLET ORAL at 04:49

## 2021-05-08 RX ADMIN — ENOXAPARIN SODIUM 40 MG: 40 INJECTION SUBCUTANEOUS at 04:48

## 2021-05-08 RX ADMIN — ACYCLOVIR 400 MG: 800 TABLET ORAL at 04:49

## 2021-05-08 RX ADMIN — ACYCLOVIR 400 MG: 800 TABLET ORAL at 18:03

## 2021-05-08 RX ADMIN — ONDANSETRON 8 MG: 2 INJECTION INTRAMUSCULAR; INTRAVENOUS at 18:03

## 2021-05-08 RX ADMIN — VINCRISTINE SULFATE: 1 INJECTION, SOLUTION INTRAVENOUS at 17:59

## 2021-05-08 RX ADMIN — LORAZEPAM 1 MG: 1 TABLET ORAL at 20:57

## 2021-05-08 RX ADMIN — DOCUSATE SODIUM 50 MG AND SENNOSIDES 8.6 MG 2 TABLET: 8.6; 5 TABLET, FILM COATED ORAL at 04:49

## 2021-05-08 RX ADMIN — PREDNISONE 120 MG: 20 TABLET ORAL at 18:03

## 2021-05-08 RX ADMIN — PREDNISONE 120 MG: 20 TABLET ORAL at 04:49

## 2021-05-08 RX ADMIN — ALLOPURINOL 300 MG: 300 TABLET ORAL at 04:49

## 2021-05-08 RX ADMIN — POTASSIUM CHLORIDE 20 MEQ: 1500 TABLET, EXTENDED RELEASE ORAL at 18:05

## 2021-05-08 ASSESSMENT — ENCOUNTER SYMPTOMS
DIZZINESS: 0
NAUSEA: 0
BACK PAIN: 0
FOCAL WEAKNESS: 0
INSOMNIA: 0
BLOOD IN STOOL: 0
DIARRHEA: 0
CHILLS: 0
COUGH: 0
WHEEZING: 0
HEADACHES: 0
VOMITING: 0
ABDOMINAL PAIN: 0
SHORTNESS OF BREATH: 0
DEPRESSION: 0
NERVOUS/ANXIOUS: 0
MYALGIAS: 0
WEIGHT LOSS: 0
WEAKNESS: 0
PALPITATIONS: 0
FEVER: 0
CONSTIPATION: 0

## 2021-05-08 ASSESSMENT — PAIN DESCRIPTION - PAIN TYPE: TYPE: ACUTE PAIN

## 2021-05-08 ASSESSMENT — FIBROSIS 4 INDEX: FIB4 SCORE: 1.8

## 2021-05-08 NOTE — PROGRESS NOTES
"Pharmacy Chemotherapy Verification    Patient Name: Khalif Kirkpatrick     Diagnosis: Aggressive B Cell Lymphoma    Protocol: R-EPOCH, 21-day cycle for 6 cycles  Starting with Dose Level 1, exceptions outlined below   RiTUXimab-pvvr 375 mg/m2 IV on   Day 5    -5/6/21 - Day 5 for Cycle 1 due to risk of TLS for Cycle 1 per Dr. Pond  Etoposide  IV continuous infusion over 24 hours daily on Days 1 - 4   -5/6/21 - Dose reduced to 35 mg/m2 due to renal impairment  DOXOrubicin 10 mg/m2 IV continuous infusion over 24 hours daily on Days 1 - 4  VinCRIStine 0.4 mg/m2 IV continuous infusion over 24 hour daily on Days 1 - 4  Cyclophosphamide 750 mg/m2 IV over 30 minutes on Day 5  Prednisone 60 mg/m2 PO twice daily on Days 1 - 5    Dose Adjustment based on twice weekly CBCs, at least 3 days apart:  Vimal measurements Dose-adjustment   If vimal ANC > 0.5 x 109/L Increase 1 dose level  in etoposide, doxorubicin, and cyclophosphamide above last cycle   If vimal ANC < 0.5 x 109/L on 1 or 2 measurements Same dose(s) as last cycle   If vimal ANC < 0.5 x 109/L on at least 3 measurements  Decrease 1 dose level in etoposide, doxorubicin, and cyclophosphamide below last cycle   OR     If vimal platelet count < 25 x 109/L on 1 measurement Decrease 1 dose level in etoposide, doxorubicin, and cyclophosphamide below last cycle     • Dose adjustments above starting dose level (level 1) apply to etoposide, doxorubicin and cyclophosphamide  • Dose adjustments below starting dose level (level 1) apply to cyclophosphamide only.   NCCN Guidelines for B-Cell Lymphomas V.1.2019.  Abimael HUSSEIN, et al. Blood. 2002;99(8):2685-93.  Abimael HUSSEIN, et al. J Clin Oncol. 2008;26(16):2717-24.    Allergies: Ibuprofen       /67   Pulse 62   Temp 37.1 °C (98.8 °F) (Temporal)   Resp 17   Ht 1.753 m (5' 9\")   Wt 85.3 kg (188 lb 0.8 oz)   SpO2 99%   BMI 27.77 kg/m²  Body surface area is 2.04 meters squared.     Weight Type Height Weight BSA   Most recent " 175.3 cm 85.3 kg 2.04 m²     ECHO 5/2/21  LVEF 65%      5/1/2021 12:10   Hepatitis B Surface Antigen Non-Reactive   Hepatitis B Cors Ab,IgM Non-Reactive     Labs reviewed 5/8/21 are within acceptable parameters for treatment. Potassium repleted.     Drug Order   (Drug name, dose, route, IV Fluid & volume, frequency, number of doses) Cycle 1, Day 3 of 5      Previous treatment: N/A     Medication = Etoposide (TOPOSAR)   Base Dose = 35 mg/m2  Calc Dose: Base Dose x 2.04 m2 = 71.4 mg  Final Dose = 71.4 mg  Route = IV  Fluid & Volume = NS 1,000 mL  Admin Duration = Over 24 hours   Admixed with DOXOrubicin and vinCRIStine    Days 1 - 4       <10% difference, okay to treat with final dose   Medication = DOXOrubicin (ADRIAMYCIN)  Base Dose = 10 mg/m2  Calc Dose:Base Dose x 2.04 m2 = 20.4 mg  Final Dose = 20.4 mg  Route = IV  Fluid & Volume = NS 1,000 mL  Admin Duration = Over 24 hours   Admixed with etoposide and vinCRIStine     Days 1 - 4      <10% difference, okay to treat with final dose   Medication = vinCRIStine (ONCOVIN)  Base Dose = 0.4 mg/m2  Calc Dose: Base Dose x 2.04 m2 = 0.816 mg  Final Dose = 0.8 mg  Route = IV  Fluid & Volume = NS 1,000 mL  Admin Duration = Over 24 hours   Admixed with etoposide and DOXOorubicin     Days 1 - 4      <10% difference, okay to treat with final dose   By my signature below, I confirm this process was performed independently with the BSA and all final chemotherapy dosing calculations congruent. I have reviewed the above chemotherapy order and that my calculation of the final dose and BSA (when applicable) corroborate those calculations of the  pharmacist. Discrepancies of 10% or greater in the written dose have been addressed and documented within the [facility] Progress notes.    Ameena Colorado, PharmD, BCPS

## 2021-05-08 NOTE — PROGRESS NOTES
Chemotherapy Verification - SECONDARY RN       Height = 175.3 cm  Weight = 85.3 kg  BSA = 2.04 m2       Medication: Etoposide  Dose: 35 mg/m2  Calculated Dose: 71.4 mg                      Ordered dose: 71.8 mg       (In mg/m2, AUC, mg/kg)     Medication: Doxorubicin  Dose: 10 mg/m2  Calculated Dose: 20.4 mg                      Ordered dose: 20.5 mg       (In mg/m2, AUC, mg/kg)    Medication: Vincristine  Dose: 0.4 mg/m2  Calculated Dose: 0.8 mg                      Ordered dose: 0.8 mg       (In mg/m2, AUC, mg/kg)      I confirm that this process was performed independently.

## 2021-05-08 NOTE — CARE PLAN
Problem: Venous Thromboembolism (VTW)/Deep Vein Thrombosis (DVT) Prevention:  Goal: Patient will participate in Venous Thrombosis (VTE)/Deep Vein Thrombosis (DVT)Prevention Measures  Outcome: PROGRESSING AS EXPECTED  Note: Lovenox ordered and in use. Pt ambulates in room to bathroom multiple times a day.      Problem: Pain Management  Goal: Pain level will decrease to patient's comfort goal  Outcome: PROGRESSING AS EXPECTED  Note: PRN pain available. Denies any intervention at this time.

## 2021-05-08 NOTE — PROGRESS NOTES
Chemotherapy Verification - PRIMARY RN      Height = 175.3cm  Weight = 85.3kg  BSA = 2.04 m2       Medication: etoposide  Dose: 35 mg/m2  Calculated Dose: 71.4 mg (ordered dose 71.8 mg)                             (In mg/m2, AUC, mg/kg)     Medication: doxorubicin  Dose: 10 mg/m2  Calculated Dose: 20.4 mg (ordered dose 20.5 mg)                             (In mg/m2, AUC, mg/kg)    Medication: vincristine  Dose: 0.4 mg/m2  Calculated Dose: 0.82 mg (ordered dose 0.8 mg)                             (In mg/m2, AUC, mg/kg)      I confirm this process was performed independently with the BSA and all final chemotherapy dosing calculations congruent.  Any discrepancies of 10% or greater have been addressed with the chemotherapy pharmacist. The resolution of the discrepancy has been documented in the EPIC progress notes.

## 2021-05-08 NOTE — PROGRESS NOTES
Oncology/Hematology Progress Note               Author: Veronica Pond M.D. Date & Time created: 5/8/2021  1:11 PM     Interval History:  No acute events overnight. He has fatigue but otherwise feels he is tolerating therapy well. No nausea, good appetite. Pt wife feels he is doing well. She feels his cognition is much better.    Review of Systems:  Review of Systems   All other systems reviewed and are negative.      Physical Exam:  Physical Exam  Constitutional:       Appearance: Normal appearance. He is not ill-appearing.   HENT:      Head: Normocephalic and atraumatic.      Right Ear: External ear normal.      Left Ear: External ear normal.      Nose: Nose normal.      Mouth/Throat:      Pharynx: No oropharyngeal exudate.   Eyes:      General: No scleral icterus.     Conjunctiva/sclera: Conjunctivae normal.   Cardiovascular:      Rate and Rhythm: Normal rate and regular rhythm.      Heart sounds: No murmur.   Pulmonary:      Effort: Pulmonary effort is normal. No respiratory distress.      Breath sounds: Normal breath sounds.   Abdominal:      General: Abdomen is flat. Bowel sounds are normal. There is no distension.      Palpations: Abdomen is soft.      Tenderness: There is no abdominal tenderness.   Musculoskeletal:         General: No swelling or tenderness.      Cervical back: Neck supple.   Skin:     General: Skin is warm and dry.   Neurological:      General: No focal deficit present.      Mental Status: He is alert and oriented to person, place, and time.   Psychiatric:         Mood and Affect: Mood normal.         Behavior: Behavior normal.         Thought Content: Thought content normal.         Judgment: Judgment normal.         Labs:          Recent Labs     05/06/21  0100 05/07/21  0206 05/08/21  0139   SODIUM 136 135 139   POTASSIUM 4.1 4.4 4.0   CHLORIDE 103 101 106   CO2 22 21 23   BUN 19 20 22   CREATININE 1.56* 1.54* 1.38   PHOSPHORUS  --  3.9 3.2   CALCIUM 8.8 8.5 8.5     Recent Labs      21  02021  0139   GLUCOSE 83 149* 154*     Recent Labs     21  013   RBC 3.78* 3.55* 3.56*   HEMOGLOBIN 9.6* 9.0* 9.0*   HEMATOCRIT 30.0* 28.4* 28.3*   PLATELETCT 311 220 254   IRON 22*  --   --    TOTIRONBC 170*  --   --      Recent Labs     21  02021  013   WBC 6.1 1.9* 4.9     Recent Labs     21  013   SODIUM 136 135 139   POTASSIUM 4.1 4.4 4.0   CHLORIDE 103 101 106   CO2 22 21 23   GLUCOSE 83 149* 154*   BUN 19 20 22   CREATININE 1.56* 1.54* 1.38   CALCIUM 8.8 8.5 8.5     Hemodynamics:  Temp (24hrs), Av °C (98.6 °F), Min:36.8 °C (98.2 °F), Max:37.2 °C (98.9 °F)  Temperature: 37.1 °C (98.8 °F)  Pulse  Av.7  Min: 61  Max: 96   Blood Pressure : 143/67     Respiratory:    Respiration: 17, Pulse Oximetry: 99 %        RLL Breath Sounds: Diminished, LLL Breath Sounds: Diminished  Fluids:    Intake/Output Summary (Last 24 hours) at 5/3/2021 1253  Last data filed at 2021 1545  Gross per 24 hour   Intake 240 ml   Output 200 ml   Net 40 ml     Weight: 85.3 kg (188 lb 0.8 oz)  GI/Nutrition:  Orders Placed This Encounter   Procedures   • Diet Order Diet: Regular     Standing Status:   Standing     Number of Occurrences:   1     Order Specific Question:   Diet:     Answer:   Regular [1]     Medical Decision Making, by Problem:  Active Hospital Problems    Diagnosis    • *Retroperitoneal mass [R19.00]    • Hypercalcemia [E83.52]    • Acute kidney injury superimposed on CKD (HCC) [N17.9, N18.9]    • Hydronephrosis [N13.30]    • Pleural effusion on left [J90]    • Hypertension [I10]        Assessment and Plan:  69 y/o otherwise healthy man presented with pain found to have a very large RP mass. This was biopsied on 21 and showed aggressive B-cell lymphoma, final path pending.    # Aggressive B-cell Lymphoma (CD20 positive), FISH results pending - large RP mass on imaging causing  renal dysfunction, s/p stent. Reviewed the diagnosis in detail with the patient and his wife.   - ECHO normal EF of 65%  - PET/CT scan, - pt declines even with sedation, will order CT scans to complete staging imaging, pt understands this is not the preferred staging evaluation for his lymphoma but adamantly declines a PET/CT scan.  - Bone marrow biopsy - completed on 5/3/21, normal prelim  - PICC line placement - completed  - Continue allopurinol  - CT chest and neck normal  - Cycle 1 Day 3 R-EPOCH to start later today, Rituximab to be given on Day 5 for cycle 1 due to risk of TLS.  - Decrease dose of etoposide by 25% due to renal dysfunction  - tolerating chemotherapy as expected, continue, SE reviewed, pt in agreement    # Acute Kidney Injury - 2/2 RP mass  - s/p stent placement, creatinine improving, monitor hopefully gets near normal prior to starting chemotherapy, this continues to improve    # Anemia, microcytic - Hemoglobin stable around 9  - monitor    # PPX: acyclovir and allopurinol  - monitor labs q 12 hours - no issues    Will Continue to follow   High Complexity, High risk patient  Quality-Core Measures

## 2021-05-08 NOTE — PROGRESS NOTES
[facility] Medicine Daily Progress Note    Date of Service  5/8/2021    Chief Complaint  70 y.o. male admitted 4/27/2021 with weakness and back pain     Hospital Course  70 y.o. male with past medical history of hypertension on atenolol, CKD who presented 4/27/2021 as a transfer from [facility] for abnormal labs by PMD. Patient sates that he has been having back pain, low grade fevers, night sweats, and weight loss that started in November 2020. He characterizes his low back pain as migratory and dull, 6/10 in intensity, not relieved with anything exacerbated with movement. His PMD is Dr. Singh. On admission he had a calcium level of 14 and was given IVF and IV 4 mg zolendronic acid on admission. Repeat CT abdomen pelvis done without contrast due to patient's kidney function showed a large retroperitoneal mass surrounding the left kidney and obstructing the left ureter, mass also surrounds the aorta superior mesenteric artery and vein. Urology consulted for hydronephrosis.  S/p cystoscopy with ureteral stent insertion. IR biopsy done 4/29.  Pathology preliminary results aggressive B-cell lymphoma, oncology has been consulted.      Interval Problem Update  Patient in a calm and pleasant mood. He denies any complaints of pain. Creatinine 1.38. He does have mild hyperglycemia (-154) , which is likely related to prednisone. Will hold off on insulin for now and monitor closely. He has also had elevated blood pressures with SBP in the 150s today. Again, likely exacerbated by steroids. Will consider adjusting antihypertensives if persists .    Consultants/Specialty  Urology   IR  Oncology   Palliative Care     Code Status  Full Code    Disposition  Continue inpatient for R-EPOCH. Can likely discharge home on Tuesday after regimen complete     Review of Systems  Review of Systems   Constitutional: Negative for chills, fever, malaise/fatigue and weight loss.   Respiratory: Negative for cough, shortness of breath and  wheezing.    Cardiovascular: Negative for chest pain, palpitations and leg swelling.   Gastrointestinal: Negative for abdominal pain, blood in stool, constipation, diarrhea, nausea and vomiting.   Genitourinary: Negative for dysuria, frequency, hematuria and urgency.   Musculoskeletal: Negative for back pain and myalgias.   Skin: Negative for rash.   Neurological: Negative for dizziness, focal weakness, weakness and headaches.   Psychiatric/Behavioral: Negative for depression. The patient is not nervous/anxious and does not have insomnia.    All other systems reviewed and are negative.       Physical Exam  Temp:  [36.8 °C (98.2 °F)-37.2 °C (98.9 °F)] 37 °C (98.6 °F)  Pulse:  [62-78] 74  Resp:  [17-18] 18  BP: (146-156)/(71-76) 152/71  SpO2:  [95 %-98 %] 95 %    Physical Exam  Vitals and nursing note reviewed.   Constitutional:       General: He is not in acute distress.     Appearance: He is overweight. He is not ill-appearing.   HENT:      Head: Normocephalic and atraumatic.      Right Ear: External ear normal.      Left Ear: External ear normal.      Nose: No congestion or rhinorrhea.      Mouth/Throat:      Mouth: Mucous membranes are moist.      Pharynx: Oropharynx is clear.   Eyes:      General: No scleral icterus.        Right eye: No discharge.         Left eye: No discharge.      Conjunctiva/sclera: Conjunctivae normal.   Cardiovascular:      Rate and Rhythm: Normal rate and regular rhythm.   Pulmonary:      Effort: Pulmonary effort is normal. No respiratory distress.      Breath sounds: Normal breath sounds. No wheezing.   Abdominal:      General: Abdomen is flat. There is no distension.      Palpations: Abdomen is soft.      Tenderness: There is no abdominal tenderness.   Musculoskeletal:         General: No swelling. Normal range of motion.      Cervical back: Normal range of motion and neck supple. No rigidity.   Skin:     General: Skin is warm and dry.      Coloration: Skin is pale.   Neurological:       Mental Status: He is alert, oriented to person, place, and time and easily aroused. Mental status is at baseline.   Psychiatric:         Attention and Perception: Attention normal.         Mood and Affect: Mood normal.         Speech: Speech normal.         Behavior: Behavior normal. Behavior is cooperative.         Cognition and Memory: Cognition normal.         Fluids    Intake/Output Summary (Last 24 hours) at 5/8/2021 0939  Last data filed at 5/7/2021 1830  Gross per 24 hour   Intake 360 ml   Output 950 ml   Net -590 ml       Laboratory  Recent Labs     05/06/21  0100 05/07/21  0206 05/08/21  0139   WBC 6.1 1.9* 4.9   RBC 3.78* 3.55* 3.56*   HEMOGLOBIN 9.6* 9.0* 9.0*   HEMATOCRIT 30.0* 28.4* 28.3*   MCV 79.4* 80.0* 79.5*   MCH 25.4* 25.4* 25.3*   MCHC 32.0* 31.7* 31.8*   RDW 43.4 42.7 42.1   PLATELETCT 311 220 254   MPV 9.9 10.2 10.3     Recent Labs     05/06/21  0100 05/07/21  0206 05/08/21  0139   SODIUM 136 135 139   POTASSIUM 4.1 4.4 4.0   CHLORIDE 103 101 106   CO2 22 21 23   GLUCOSE 83 149* 154*   BUN 19 20 22   CREATININE 1.56* 1.54* 1.38   CALCIUM 8.8 8.5 8.5          Lab Results   Component Value Date/Time    HEPAIGM Non-Reactive 05/01/2021 1210    HEPBSAG Non-Reactive 05/01/2021 1210    HEPBCORIGM Non-Reactive 05/01/2021 1210    HEPCAB Non-Reactive 05/01/2021 1210     Results from last 7 days   Lab Units 05/08/21  0139 05/07/21  0206 05/06/21  0100 05/05/21  0020 05/04/21  0317 05/03/21  0033 05/02/21  0304 05/01/21  1210   URIC ACID 107 mg/dL 6.1 6.8 7.4 7.4 7.5 7.5 7.7 7.8     Results from last 7 days   Lab Units 05/08/21  0139 05/07/21  0206 05/01/21  1210   LDH TOTAL 115 U/L 179 209 157                 Imaging  CT-CHEST (THORAX) W/O   Final Result         1.  Large confluent presumed hebr mass in the retroperitoneum and upper abdomen, and contiguous with retrocrural mass in keeping with biopsy-proven lymphoma.   2.  Small to moderate left and small right pleural effusions.               CT-SOFT  TISSUE NECK W/O   Final Result      1.  No significant neck mass or adenopathy is seen on this noncontrast study.   2.  Bilateral pleural effusions.      IR-PICC LINE PLACEMENT W/ GUIDANCE > AGE 5   Final Result                  Ultrasound-guided PICC placement performed by qualified nursing staff as    above.          EC-ECHOCARDIOGRAM COMPLETE W/O CONT   Final Result      CT-NEEDLE BX-ABD-RETROPERITONL   Final Result      1.  CT GUIDED RIGHT RETROPERITONEAL BIOPSY. MASSIVE RIGHT-SIDED PARA-AORTIC ADENOPATHY WAS TARGETED.      DX-CYSTO FLUORO > 1 HOUR   Final Result      Cysto fluoroscopy utilized for 36 seconds         INTERPRETING LOCATION: [facility], [place], [address]      CT-ABDOMEN-PELVIS W/O   Final Result      1.  Very large retroperitoneal mass surrounding LEFT kidney and obstructing LEFT ureter.  Mass also surrounds the aorta, superior mesenteric artery and vein.   2.  Nonobstructing RIGHT kidney stone.   3.  Minimal ascites.   4.  Evaluation is limited due to lack of IV contrast.      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)  Assessment & Plan  Started R-EPOCH 5/6 (last dose on 5/10)   Monitor for tumor lysis syndrome with Q12 BMP, phos, LDH, uric acid   Monitor renal function closely  Symptom management     Retroperitoneal mass  Assessment & Plan  Prelim pathology positive for aggressive B-cell lymphoma, pending final results   s/p biopsy   -echo: 65% EF  -bone marrow bx completed, results pending  -uric acid, LDH within normal limits, hepatitis, and HIV negative  -back pain management  -PICC line in place       Acute kidney injury superimposed on CKD (HCC)  Assessment & Plan  Probably secondary to left sided hydronephrosis causing post obstruction failure  S/p ureteral stent   Monitor Cr closely during chemotherapy   Maintain hydration    Anemia of  chronic disease- (present on admission)  Assessment & Plan  H/H trending down secondary to chemotherapy   Monitor with CBC   Transfuse for Hgb < 7     Hypertension  Assessment & Plan  Continue with atenolol and amlodipine  Monitor VS per protocol and adjust as needed      Hydronephrosis  Assessment & Plan  From mass causing obstruction.  s/p cystoscopy and ureteral stent placement 4/28   improving         VTE prophylaxis: Lovenox , ambulation     I have performed a physical exam and reviewed and updated ROS and Plan today (5/8/2021). In review of yesterday's note (5/7/2021), there are no changes except as documented above.     SHEREE Coles.

## 2021-05-08 NOTE — PROGRESS NOTES
Chemotherapy Verification - SECONDARY RN  Cycle 1 Day 2       Height = 175.3 cm  Weight = 86.2 kg  BSA = 2.05 m2       Medication: Etoposide  Dose: 35 mg/m2  Calculated Dose: 71.75 mg (71.8 mg ordered)                             (In mg/m2, AUC, mg/kg)     Medication: Doxorubicin  Dose: 10 mg/m2  Calculated Dose: 20.5 mg (20.5 mg ordered)                             (In mg/m2, AUC, mg/kg)    Medication: Vincristine  Dose: 0.4 mg/m2  Calculated Dose: 0.82 mg (0.8 mg ordered)                             (In mg/m2, AUC, mg/kg)      I confirm that this process was performed independently.

## 2021-05-09 LAB
ALBUMIN SERPL BCP-MCNC: 3.5 G/DL (ref 3.2–4.9)
ALBUMIN/GLOB SERPL: 1.3 G/DL
ALP SERPL-CCNC: 73 U/L (ref 30–99)
ALT SERPL-CCNC: 9 U/L (ref 2–50)
ANION GAP SERPL CALC-SCNC: 10 MMOL/L (ref 7–16)
ANION GAP SERPL CALC-SCNC: 9 MMOL/L (ref 7–16)
AST SERPL-CCNC: 15 U/L (ref 12–45)
BILIRUB SERPL-MCNC: 0.2 MG/DL (ref 0.1–1.5)
BUN SERPL-MCNC: 21 MG/DL (ref 8–22)
BUN SERPL-MCNC: 22 MG/DL (ref 8–22)
CALCIUM SERPL-MCNC: 8.2 MG/DL (ref 8.5–10.5)
CALCIUM SERPL-MCNC: 8.4 MG/DL (ref 8.5–10.5)
CHLORIDE SERPL-SCNC: 105 MMOL/L (ref 96–112)
CHLORIDE SERPL-SCNC: 105 MMOL/L (ref 96–112)
CO2 SERPL-SCNC: 22 MMOL/L (ref 20–33)
CO2 SERPL-SCNC: 23 MMOL/L (ref 20–33)
CREAT SERPL-MCNC: 1.28 MG/DL (ref 0.5–1.4)
CREAT SERPL-MCNC: 1.33 MG/DL (ref 0.5–1.4)
ERYTHROCYTE [DISTWIDTH] IN BLOOD BY AUTOMATED COUNT: 41.2 FL (ref 35.9–50)
GLOBULIN SER CALC-MCNC: 2.7 G/DL (ref 1.9–3.5)
GLUCOSE SERPL-MCNC: 132 MG/DL (ref 65–99)
GLUCOSE SERPL-MCNC: 139 MG/DL (ref 65–99)
HCT VFR BLD AUTO: 30.6 % (ref 42–52)
HGB BLD-MCNC: 9.9 G/DL (ref 14–18)
LDH SERPL L TO P-CCNC: 177 U/L (ref 107–266)
LDH SERPL L TO P-CCNC: 192 U/L (ref 107–266)
MCH RBC QN AUTO: 25.3 PG (ref 27–33)
MCHC RBC AUTO-ENTMCNC: 32.4 G/DL (ref 33.7–35.3)
MCV RBC AUTO: 78.3 FL (ref 81.4–97.8)
PHOSPHATE SERPL-MCNC: 2.2 MG/DL (ref 2.5–4.5)
PHOSPHATE SERPL-MCNC: 2.7 MG/DL (ref 2.5–4.5)
PLATELET # BLD AUTO: 338 K/UL (ref 164–446)
PMV BLD AUTO: 9.9 FL (ref 9–12.9)
POTASSIUM SERPL-SCNC: 3.7 MMOL/L (ref 3.6–5.5)
POTASSIUM SERPL-SCNC: 3.7 MMOL/L (ref 3.6–5.5)
PROT SERPL-MCNC: 6.2 G/DL (ref 6–8.2)
RBC # BLD AUTO: 3.91 M/UL (ref 4.7–6.1)
SODIUM SERPL-SCNC: 137 MMOL/L (ref 135–145)
SODIUM SERPL-SCNC: 137 MMOL/L (ref 135–145)
URATE SERPL-MCNC: 4.4 MG/DL (ref 2.5–8.3)
URATE SERPL-MCNC: 5 MG/DL (ref 2.5–8.3)
WBC # BLD AUTO: 8.4 K/UL (ref 4.8–10.8)

## 2021-05-09 PROCEDURE — A9270 NON-COVERED ITEM OR SERVICE: HCPCS | Performed by: INTERNAL MEDICINE

## 2021-05-09 PROCEDURE — 700102 HCHG RX REV CODE 250 W/ 637 OVERRIDE(OP): Performed by: NURSE PRACTITIONER

## 2021-05-09 PROCEDURE — 99233 SBSQ HOSP IP/OBS HIGH 50: CPT | Performed by: NURSE PRACTITIONER

## 2021-05-09 PROCEDURE — 700102 HCHG RX REV CODE 250 W/ 637 OVERRIDE(OP): Performed by: INTERNAL MEDICINE

## 2021-05-09 PROCEDURE — 85027 COMPLETE CBC AUTOMATED: CPT

## 2021-05-09 PROCEDURE — A9270 NON-COVERED ITEM OR SERVICE: HCPCS | Performed by: STUDENT IN AN ORGANIZED HEALTH CARE EDUCATION/TRAINING PROGRAM

## 2021-05-09 PROCEDURE — 770004 HCHG ROOM/CARE - ONCOLOGY PRIVATE *

## 2021-05-09 PROCEDURE — 84550 ASSAY OF BLOOD/URIC ACID: CPT

## 2021-05-09 PROCEDURE — 700102 HCHG RX REV CODE 250 W/ 637 OVERRIDE(OP): Performed by: STUDENT IN AN ORGANIZED HEALTH CARE EDUCATION/TRAINING PROGRAM

## 2021-05-09 PROCEDURE — 80053 COMPREHEN METABOLIC PANEL: CPT

## 2021-05-09 PROCEDURE — 80048 BASIC METABOLIC PNL TOTAL CA: CPT

## 2021-05-09 PROCEDURE — 700105 HCHG RX REV CODE 258: Performed by: INTERNAL MEDICINE

## 2021-05-09 PROCEDURE — 700111 HCHG RX REV CODE 636 W/ 250 OVERRIDE (IP): Mod: JG | Performed by: INTERNAL MEDICINE

## 2021-05-09 PROCEDURE — 83615 LACTATE (LD) (LDH) ENZYME: CPT | Mod: 91

## 2021-05-09 PROCEDURE — A9270 NON-COVERED ITEM OR SERVICE: HCPCS | Performed by: NURSE PRACTITIONER

## 2021-05-09 PROCEDURE — 700111 HCHG RX REV CODE 636 W/ 250 OVERRIDE (IP): Performed by: STUDENT IN AN ORGANIZED HEALTH CARE EDUCATION/TRAINING PROGRAM

## 2021-05-09 PROCEDURE — 84100 ASSAY OF PHOSPHORUS: CPT | Mod: 91

## 2021-05-09 RX ORDER — ONDANSETRON 2 MG/ML
8 INJECTION INTRAMUSCULAR; INTRAVENOUS ONCE
Status: COMPLETED | OUTPATIENT
Start: 2021-05-09 | End: 2021-05-09

## 2021-05-09 RX ADMIN — ONDANSETRON 8 MG: 2 INJECTION INTRAMUSCULAR; INTRAVENOUS at 18:02

## 2021-05-09 RX ADMIN — ACYCLOVIR 400 MG: 800 TABLET ORAL at 04:54

## 2021-05-09 RX ADMIN — LORAZEPAM 1 MG: 1 TABLET ORAL at 20:38

## 2021-05-09 RX ADMIN — ENOXAPARIN SODIUM 40 MG: 40 INJECTION SUBCUTANEOUS at 04:54

## 2021-05-09 RX ADMIN — DOCUSATE SODIUM 50 MG AND SENNOSIDES 8.6 MG 2 TABLET: 8.6; 5 TABLET, FILM COATED ORAL at 18:02

## 2021-05-09 RX ADMIN — FAMOTIDINE 20 MG: 20 TABLET ORAL at 18:06

## 2021-05-09 RX ADMIN — PREDNISONE 120 MG: 20 TABLET ORAL at 18:03

## 2021-05-09 RX ADMIN — FAMOTIDINE 20 MG: 20 TABLET ORAL at 04:54

## 2021-05-09 RX ADMIN — DIBASIC SODIUM PHOSPHATE, MONOBASIC POTASSIUM PHOSPHATE AND MONOBASIC SODIUM PHOSPHATE 250 MG: 852; 155; 130 TABLET ORAL at 18:04

## 2021-05-09 RX ADMIN — ACYCLOVIR 400 MG: 800 TABLET ORAL at 18:03

## 2021-05-09 RX ADMIN — ALLOPURINOL 300 MG: 300 TABLET ORAL at 04:54

## 2021-05-09 RX ADMIN — PREDNISONE 120 MG: 20 TABLET ORAL at 04:54

## 2021-05-09 RX ADMIN — AMLODIPINE BESYLATE 10 MG: 10 TABLET ORAL at 04:54

## 2021-05-09 RX ADMIN — OXYCODONE 5 MG: 5 TABLET ORAL at 20:38

## 2021-05-09 RX ADMIN — DOCUSATE SODIUM 50 MG AND SENNOSIDES 8.6 MG 2 TABLET: 8.6; 5 TABLET, FILM COATED ORAL at 04:54

## 2021-05-09 RX ADMIN — VINCRISTINE SULFATE: 1 INJECTION, SOLUTION INTRAVENOUS at 17:48

## 2021-05-09 RX ADMIN — ATENOLOL 50 MG: 50 TABLET ORAL at 09:12

## 2021-05-09 ASSESSMENT — ENCOUNTER SYMPTOMS
FEVER: 0
BACK PAIN: 0
SHORTNESS OF BREATH: 0
HEADACHES: 0
WHEEZING: 0
VOMITING: 0
DIZZINESS: 0
FOCAL WEAKNESS: 0
WEIGHT LOSS: 0
WEAKNESS: 0
CHILLS: 0
NAUSEA: 0
NERVOUS/ANXIOUS: 0
CONSTIPATION: 0
DEPRESSION: 0
INSOMNIA: 0
COUGH: 0
DIARRHEA: 0
PALPITATIONS: 0
ABDOMINAL PAIN: 0
BLOOD IN STOOL: 0
MYALGIAS: 0

## 2021-05-09 ASSESSMENT — PAIN DESCRIPTION - PAIN TYPE
TYPE: ACUTE PAIN
TYPE: ACUTE PAIN

## 2021-05-09 NOTE — PROGRESS NOTES
Oncology/Hematology Progress Note               Author: Veronica Pond M.D. Date & Time created: 5/9/2021  12:52 PM     Interval History:  No acute events overnight. He feels good. No nausea. Does have a bit of fatigue.    Review of Systems:  Review of Systems   All other systems reviewed and are negative.      Physical Exam:  Physical Exam  Constitutional:       Appearance: Normal appearance. He is not ill-appearing.   HENT:      Head: Normocephalic and atraumatic.      Right Ear: External ear normal.      Left Ear: External ear normal.      Nose: Nose normal.      Mouth/Throat:      Pharynx: No oropharyngeal exudate.   Eyes:      General: No scleral icterus.     Conjunctiva/sclera: Conjunctivae normal.   Cardiovascular:      Rate and Rhythm: Normal rate and regular rhythm.      Heart sounds: No murmur.   Pulmonary:      Effort: Pulmonary effort is normal. No respiratory distress.      Breath sounds: Normal breath sounds.   Abdominal:      General: Abdomen is flat. Bowel sounds are normal. There is no distension.      Palpations: Abdomen is soft.      Tenderness: There is no abdominal tenderness.   Musculoskeletal:         General: No swelling or tenderness.      Cervical back: Neck supple.   Skin:     General: Skin is warm and dry.   Neurological:      General: No focal deficit present.      Mental Status: He is alert and oriented to person, place, and time.   Psychiatric:         Mood and Affect: Mood normal.         Behavior: Behavior normal.         Thought Content: Thought content normal.         Judgment: Judgment normal.         Labs:          Recent Labs     05/08/21 0139 05/08/21  1238 05/09/21  0107   SODIUM 139 138 137   POTASSIUM 4.0 3.5* 3.7   CHLORIDE 106 106 105   CO2 23 21 23   BUN 22 21 22   CREATININE 1.38 1.42* 1.28   PHOSPHORUS 3.2 2.6 2.7   CALCIUM 8.5 8.6 8.4*     Recent Labs     05/08/21 0139 05/08/21  1238 05/09/21  0107   ALTSGPT  --   --  9   ASTSGOT  --   --  15   ALKPHOSPHAT  --    --  73   TBILIRUBIN  --   --  0.2   GLUCOSE 154* 155* 132*     Recent Labs     21  0206 21  0139 21  0107   RBC 3.55* 3.56* 3.91*   HEMOGLOBIN 9.0* 9.0* 9.9*   HEMATOCRIT 28.4* 28.3* 30.6*   PLATELETCT 220 254 338     Recent Labs     21  0206 21  0139 21  0107   WBC 1.9* 4.9 8.4   ASTSGOT  --   --  15   ALTSGPT  --   --  9   ALKPHOSPHAT  --   --  73   TBILIRUBIN  --   --  0.2     Recent Labs     21  0139 21  1238 21  0107   SODIUM 139 138 137   POTASSIUM 4.0 3.5* 3.7   CHLORIDE 106 106 105   CO2 23 21 23   GLUCOSE 154* 155* 132*   BUN 22 21 22   CREATININE 1.38 1.42* 1.28   CALCIUM 8.5 8.6 8.4*     Hemodynamics:  Temp (24hrs), Av.8 °C (98.3 °F), Min:36.5 °C (97.7 °F), Max:37.2 °C (98.9 °F)  Temperature: 36.6 °C (97.9 °F)  Pulse  Av.3  Min: 57  Max: 96   Blood Pressure : 140/74     Respiratory:    Respiration: 18, Pulse Oximetry: 98 %        RLL Breath Sounds: Diminished, LLL Breath Sounds: Diminished  Fluids:    Intake/Output Summary (Last 24 hours) at 5/3/2021 1253  Last data filed at 2021 1545  Gross per 24 hour   Intake 240 ml   Output 200 ml   Net 40 ml        GI/Nutrition:  Orders Placed This Encounter   Procedures   • Diet Order Diet: Regular     Standing Status:   Standing     Number of Occurrences:   1     Order Specific Question:   Diet:     Answer:   Regular [1]     Medical Decision Making, by Problem:  Active Hospital Problems    Diagnosis    • *Retroperitoneal mass [R19.00]    • Hypercalcemia [E83.52]    • Acute kidney injury superimposed on CKD (HCC) [N17.9, N18.9]    • Hydronephrosis [N13.30]    • Pleural effusion on left [J90]    • Hypertension [I10]        Assessment and Plan:  71 y/o otherwise healthy man presented with pain found to have a very large RP mass. This was biopsied on 21 and showed aggressive B-cell lymphoma, final path pending.    # Aggressive B-cell Lymphoma (CD20 positive), FISH results pending - large RP mass on  imaging causing renal dysfunction, s/p stent. Reviewed the diagnosis in detail with the patient and his wife.   - ECHO normal EF of 65%  - PET/CT scan, - pt declines even with sedation, will order CT scans to complete staging imaging, pt understands this is not the preferred staging evaluation for his lymphoma but adamantly declines a PET/CT scan.  - Bone marrow biopsy - completed on 5/3/21, normal prelim  - PICC line placement - completed  - Continue allopurinol  - CT chest and neck normal  - Cycle 1 Day 4 R-EPOCH to start later today, Rituximab to be given on Day 5 for cycle 1 due to risk of TLS.  - Decrease dose of etoposide by 25% due to renal dysfunction  - tolerating chemotherapy as expected, continue, SE reviewed, pt in agreement    # Acute Kidney Injury - 2/2 RP mass  - s/p stent placement, creatinine improving, monitor hopefully gets near normal prior to starting chemotherapy, this continues to improve    # Anemia, microcytic - Hemoglobin stable around 9  - monitor    # PPX: acyclovir and allopurinol  - monitor labs q 12 hours - no issues    Will Continue to follow   High Complexity, High risk patient  Quality-Core Measures

## 2021-05-09 NOTE — PROGRESS NOTES
"Pharmacy Chemotherapy Verification    Patient Name: Khalif Kirkpatrick     Diagnosis: Aggressive B Cell Lymphoma    Protocol: R-EPOCH, 21-day cycle for 6 cycles  Starting with Dose Level 1, exceptions outlined below   RiTUXimab-pvvr 375 mg/m2 IV on   Day 5    -5/6/21 - Day 5 for Cycle 1 due to risk of TLS for Cycle 1 per Dr. Pond  Etoposide  IV continuous infusion over 24 hours daily on Days 1 - 4   -5/6/21 - Dose reduced to 35 mg/m2 due to renal impairment  DOXOrubicin 10 mg/m2 IV continuous infusion over 24 hours daily on Days 1 - 4  VinCRIStine 0.4 mg/m2 IV continuous infusion over 24 hour daily on Days 1 - 4  Cyclophosphamide 750 mg/m2 IV over 30 minutes on Day 5  Prednisone 60 mg/m2 PO twice daily on Days 1 - 5    Dose Adjustment based on twice weekly CBCs, at least 3 days apart:  Vimal measurements Dose-adjustment   If vimal ANC > 0.5 x 109/L Increase 1 dose level  in etoposide, doxorubicin, and cyclophosphamide above last cycle   If vimal ANC < 0.5 x 109/L on 1 or 2 measurements Same dose(s) as last cycle   If vimal ANC < 0.5 x 109/L on at least 3 measurements  Decrease 1 dose level in etoposide, doxorubicin, and cyclophosphamide below last cycle   OR     If vimal platelet count < 25 x 109/L on 1 measurement Decrease 1 dose level in etoposide, doxorubicin, and cyclophosphamide below last cycle     • Dose adjustments above starting dose level (level 1) apply to etoposide, doxorubicin and cyclophosphamide  • Dose adjustments below starting dose level (level 1) apply to cyclophosphamide only.   NCCN Guidelines for B-Cell Lymphomas V.1.2019.  Abimael HUSSEIN, et al. Blood. 2002;99(8):2685-93.  Abimael HUSSEIN, et al. J Clin Oncol. 2008;26(16):2717-24.    Allergies: Ibuprofen       /74   Pulse 65   Temp 36.6 °C (97.9 °F) (Temporal)   Resp 18   Ht 1.753 m (5' 9\")   Wt 85.3 kg (188 lb 0.8 oz)   SpO2 98%   BMI 27.77 kg/m²  Body surface area is 2.04 meters squared.     Weight Type Height Weight BSA   Most recent " 175.3 cm 85.3 kg 2.04 m²     ECHO 5/2/21  LVEF 65%      5/1/2021 12:10   Hepatitis B Surface Antigen Non-Reactive   Hepatitis B Cors Ab,IgM Non-Reactive     Labs reviewed 5/9/21 are within acceptable parameters for treatment. Plan for conservative oral Phosphorus repletion.     Drug Order   (Drug name, dose, route, IV Fluid & volume, frequency, number of doses) Cycle 1, Day 4 of 5      Previous treatment: N/A     Medication = Etoposide (TOPOSAR)   Base Dose = 35 mg/m2  Calc Dose: Base Dose x 2.04 m2 = 71.4 mg  Final Dose = 71.4 mg  Route = IV  Fluid & Volume = NS 1,000 mL  Admin Duration = Over 24 hours   Admixed with DOXOrubicin and vinCRIStine    Days 1 - 4       <10% difference, okay to treat with final dose   Medication = DOXOrubicin (ADRIAMYCIN)  Base Dose = 10 mg/m2  Calc Dose:Base Dose x 2.04 m2 = 20.4 mg  Final Dose = 20.4 mg  Route = IV  Fluid & Volume = NS 1,000 mL  Admin Duration = Over 24 hours   Admixed with etoposide and vinCRIStine     Days 1 - 4      <10% difference, okay to treat with final dose   Medication = vinCRIStine (ONCOVIN)  Base Dose = 0.4 mg/m2  Calc Dose: Base Dose x 2.04 m2 = 0.816 mg  Final Dose = 0.8 mg  Route = IV  Fluid & Volume = NS 1,000 mL  Admin Duration = Over 24 hours   Admixed with etoposide and DOXOorubicin     Days 1 - 4      <10% difference, okay to treat with final dose   By my signature below, I confirm this process was performed independently with the BSA and all final chemotherapy dosing calculations congruent. I have reviewed the above chemotherapy order and that my calculation of the final dose and BSA (when applicable) corroborate those calculations of the  pharmacist. Discrepancies of 10% or greater in the written dose have been addressed and documented within the [facility] Progress notes.    Ameena Colorado, RejiD, BCPS

## 2021-05-09 NOTE — PROGRESS NOTES
[facility] Medicine Daily Progress Note    Date of Service  5/9/2021    Chief Complaint  70 y.o. male admitted 4/27/2021 with weakness and back pain     Hospital Course  70 y.o. male with past medical history of hypertension on atenolol, CKD who presented 4/27/2021 as a transfer from [facility] for abnormal labs by PMD. Patient sates that he has been having back pain, low grade fevers, night sweats, and weight loss that started in November 2020. He characterizes his low back pain as migratory and dull, 6/10 in intensity, not relieved with anything exacerbated with movement. His PMD is Dr. Singh. On admission he had a calcium level of 14 and was given IVF and IV 4 mg zolendronic acid on admission. Repeat CT abdomen pelvis done without contrast due to patient's kidney function showed a large retroperitoneal mass surrounding the left kidney and obstructing the left ureter, mass also surrounds the aorta superior mesenteric artery and vein. Urology consulted for hydronephrosis.  S/p cystoscopy with ureteral stent insertion. IR biopsy done 4/29.  Pathology preliminary results aggressive B-cell lymphoma, oncology has been consulted.      Interval Problem Update  Creatinine 1.28. He denies any urinary symptoms. H/H stable. Continue here for chemotherapy     Consultants/Specialty  Urology   IR  Oncology   Palliative Care     Code Status  Full Code    Disposition  Continue inpatient for R-EPOCH. Can likely discharge home on Tuesday after regimen complete     Review of Systems  Review of Systems   Constitutional: Negative for chills, fever, malaise/fatigue and weight loss.   Respiratory: Negative for cough, shortness of breath and wheezing.    Cardiovascular: Negative for chest pain, palpitations and leg swelling.   Gastrointestinal: Negative for abdominal pain, blood in stool, constipation, diarrhea, nausea and vomiting.   Genitourinary: Negative for dysuria, frequency, hematuria and urgency.   Musculoskeletal: Negative  for back pain and myalgias.   Neurological: Negative for dizziness, focal weakness, weakness and headaches.   Psychiatric/Behavioral: Negative for depression. The patient is not nervous/anxious and does not have insomnia.    All other systems reviewed and are negative.       Physical Exam  Temp:  [36.5 °C (97.7 °F)-37.2 °C (98.9 °F)] 36.6 °C (97.9 °F)  Pulse:  [57-70] 65  Resp:  [17-18] 18  BP: (140-159)/(74-81) 140/74  SpO2:  [97 %-98 %] 98 %    Physical Exam  Vitals and nursing note reviewed.   Constitutional:       General: He is not in acute distress.     Appearance: He is overweight. He is not ill-appearing.   HENT:      Head: Normocephalic and atraumatic.      Right Ear: External ear normal.      Left Ear: External ear normal.      Nose: No congestion or rhinorrhea.      Mouth/Throat:      Mouth: Mucous membranes are moist.      Pharynx: Oropharynx is clear.   Eyes:      General: No scleral icterus.        Right eye: No discharge.         Left eye: No discharge.      Conjunctiva/sclera: Conjunctivae normal.   Cardiovascular:      Rate and Rhythm: Normal rate and regular rhythm.   Pulmonary:      Effort: Pulmonary effort is normal. No respiratory distress.      Breath sounds: Normal breath sounds. No wheezing.   Abdominal:      General: Abdomen is flat. There is no distension.      Palpations: Abdomen is soft.      Tenderness: There is no abdominal tenderness.   Musculoskeletal:         General: No swelling. Normal range of motion.      Cervical back: Normal range of motion and neck supple. No rigidity.   Skin:     General: Skin is warm and dry.      Coloration: Skin is pale.   Neurological:      Mental Status: He is alert, oriented to person, place, and time and easily aroused. Mental status is at baseline.   Psychiatric:         Attention and Perception: Attention normal.         Mood and Affect: Mood normal.         Speech: Speech normal.         Behavior: Behavior normal. Behavior is cooperative.          Cognition and Memory: Cognition normal.         Fluids  No intake or output data in the 24 hours ending 05/09/21 1242    Laboratory  Recent Labs     05/07/21  0206 05/08/21  0139 05/09/21  0107   WBC 1.9* 4.9 8.4   RBC 3.55* 3.56* 3.91*   HEMOGLOBIN 9.0* 9.0* 9.9*   HEMATOCRIT 28.4* 28.3* 30.6*   MCV 80.0* 79.5* 78.3*   MCH 25.4* 25.3* 25.3*   MCHC 31.7* 31.8* 32.4*   RDW 42.7 42.1 41.2   PLATELETCT 220 254 338   MPV 10.2 10.3 9.9     Recent Labs     05/08/21  0139 05/08/21  1238 05/09/21  0107   SODIUM 139 138 137   POTASSIUM 4.0 3.5* 3.7   CHLORIDE 106 106 105   CO2 23 21 23   GLUCOSE 154* 155* 132*   BUN 22 21 22   CREATININE 1.38 1.42* 1.28   CALCIUM 8.5 8.6 8.4*          Lab Results   Component Value Date/Time    HEPAIGM Non-Reactive 05/01/2021 1210    HEPBSAG Non-Reactive 05/01/2021 1210    HEPBCORIGM Non-Reactive 05/01/2021 1210    HEPCAB Non-Reactive 05/01/2021 1210     Results from last 7 days   Lab Units 05/09/21  0107 05/08/21  1238 05/08/21  0139 05/07/21  0206 05/06/21  0100 05/05/21  0020 05/04/21  0317 05/03/21  0033   URIC ACID 107 mg/dL 5.0 5.2 6.1 6.8 7.4 7.4 7.5 7.5     Results from last 7 days   Lab Units 05/09/21  0107 05/08/21  1238 05/08/21  0139 05/07/21  0206   LDH TOTAL 115 U/L 177 196 179 209                 Imaging  CT-CHEST (THORAX) W/O   Final Result         1.  Large confluent presumed herb mass in the retroperitoneum and upper abdomen, and contiguous with retrocrural mass in keeping with biopsy-proven lymphoma.   2.  Small to moderate left and small right pleural effusions.               CT-SOFT TISSUE NECK W/O   Final Result      1.  No significant neck mass or adenopathy is seen on this noncontrast study.   2.  Bilateral pleural effusions.      IR-PICC LINE PLACEMENT W/ GUIDANCE > AGE 5   Final Result                  Ultrasound-guided PICC placement performed by qualified nursing staff as    above.          EC-ECHOCARDIOGRAM COMPLETE W/O CONT   Final Result      CT-NEEDLE  BX-ABD-RETROPERITONL   Final Result      1.  CT GUIDED RIGHT RETROPERITONEAL BIOPSY. MASSIVE RIGHT-SIDED PARA-AORTIC ADENOPATHY WAS TARGETED.      DX-CYSTO FLUORO > 1 HOUR   Final Result      Cysto fluoroscopy utilized for 36 seconds         INTERPRETING LOCATION: [address], [place], [address]      CT-ABDOMEN-PELVIS W/O   Final Result      1.  Very large retroperitoneal mass surrounding LEFT kidney and obstructing LEFT ureter.  Mass also surrounds the aorta, superior mesenteric artery and vein.   2.  Nonobstructing RIGHT kidney stone.   3.  Minimal ascites.   4.  Evaluation is limited due to lack of IV contrast.      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)  Assessment & Plan  Started R-EPOCH 5/6 (last dose on 5/10)   Monitor for tumor lysis syndrome with Q12 BMP, phos, LDH, uric acid   Monitor renal function closely  Symptom management     Retroperitoneal mass  Assessment & Plan  Prelim pathology positive for aggressive B-cell lymphoma, pending final results   s/p biopsy   -echo: 65% EF  -bone marrow bx completed, results pending  -uric acid, LDH within normal limits, hepatitis, and HIV negative  -back pain management  -PICC line in place       Acute kidney injury superimposed on CKD (HCC)  Assessment & Plan  Probably secondary to left sided hydronephrosis causing post obstruction failure  S/p ureteral stent   Monitor Cr closely during chemotherapy   Maintain hydration    Anemia of chronic disease- (present on admission)  Assessment & Plan  H/H trending down secondary to chemotherapy   Monitor with CBC   Transfuse for Hgb < 7     Hypertension  Assessment & Plan  Continue with atenolol and amlodipine  Monitor VS per protocol and adjust as needed      Hydronephrosis  Assessment & Plan  From mass causing obstruction.  s/p cystoscopy and ureteral stent placement 4/28    improving         VTE prophylaxis: Lovenox     I have performed a physical exam and reviewed and updated ROS and Plan today (5/9/2021). In review of yesterday's note (5/8/2021), there are no changes except as documented above.     SHEREE Coles.

## 2021-05-09 NOTE — PROGRESS NOTES
Chemotherapy Verification - SECONDARY RN       Height = 175.3 cm  Weight = 85.3 kg  BSA = 2.03 m2      Medication: Etoposide  Dose: 35 mg/m2  Calculated Dose: 71.33 mg    (ordered = 71.4 mg)                            (In mg/m2, AUC, mg/kg)     Medication: Doxorubicin  Dose: 10 mg/m2  Calculated Dose: 20.3 mg    (ordered = 20.4 mg)                            (In mg/m2, AUC, mg/kg)    Medication: Vincristine  Dose: 0.8 mg/m2  Calculated Dose: 0.812    (ordered = 0.8 mg)                            (In mg/m2, AUC, mg/kg)            I confirm that this process was performed independently.

## 2021-05-09 NOTE — CARE PLAN
Problem: Bowel/Gastric:  Goal: Normal bowel function is maintained or improved  Outcome: PROGRESSING AS EXPECTED  Note: Pt having normal BM. Stool softeners in use.      Problem: Pain Management  Goal: Pain level will decrease to patient's comfort goal  Outcome: PROGRESSING AS EXPECTED  Note: PRN pain medication in use when pt reports pain level.

## 2021-05-09 NOTE — PROGRESS NOTES
Chemotherapy Verification - PRIMARY RN      Height = 175.3cm  Weight = 85.3kg  BSA = 2.04m       Medication: etoposide  Dose: 35mg/m2  Calculated Dose: 71.4mg (ordered dose 71.4 mg)                             (In mg/m2, AUC, mg/kg)     Medication: doxorubicin  Dose: 10mg/m2  Calculated Dose: 20.4mg (ordered dose 20.4mg)                             (In mg/m2, AUC, mg/kg)    Medication: vincristine  Dose: 0.4mg/m2  Calculated Dose: 0.82mg (ordered dose 0.8mg)                             (In mg/m2, AUC, mg/kg)      I confirm this process was performed independently with the BSA and all final chemotherapy dosing calculations congruent.  Any discrepancies of 10% or greater have been addressed with the chemotherapy pharmacist. The resolution of the discrepancy has been documented in the EPIC progress notes.

## 2021-05-10 PROBLEM — E83.39 HYPOPHOSPHATEMIA: Status: ACTIVE | Noted: 2021-05-10

## 2021-05-10 LAB
ANION GAP SERPL CALC-SCNC: 8 MMOL/L (ref 7–16)
BUN SERPL-MCNC: 22 MG/DL (ref 8–22)
CALCIUM SERPL-MCNC: 7.8 MG/DL (ref 8.5–10.5)
CHLORIDE SERPL-SCNC: 106 MMOL/L (ref 96–112)
CO2 SERPL-SCNC: 24 MMOL/L (ref 20–33)
CREAT SERPL-MCNC: 1.19 MG/DL (ref 0.5–1.4)
ERYTHROCYTE [DISTWIDTH] IN BLOOD BY AUTOMATED COUNT: 40.9 FL (ref 35.9–50)
GLUCOSE SERPL-MCNC: 127 MG/DL (ref 65–99)
HCT VFR BLD AUTO: 29.1 % (ref 42–52)
HGB BLD-MCNC: 9.5 G/DL (ref 14–18)
MCH RBC QN AUTO: 25.3 PG (ref 27–33)
MCHC RBC AUTO-ENTMCNC: 32.6 G/DL (ref 33.7–35.3)
MCV RBC AUTO: 77.6 FL (ref 81.4–97.8)
PHOSPHATE SERPL-MCNC: 2.5 MG/DL (ref 2.5–4.5)
PHOSPHATE SERPL-MCNC: 2.7 MG/DL (ref 2.5–4.5)
PLATELET # BLD AUTO: 251 K/UL (ref 164–446)
PMV BLD AUTO: 9.7 FL (ref 9–12.9)
POTASSIUM SERPL-SCNC: 3.4 MMOL/L (ref 3.6–5.5)
RBC # BLD AUTO: 3.75 M/UL (ref 4.7–6.1)
SODIUM SERPL-SCNC: 138 MMOL/L (ref 135–145)
URATE SERPL-MCNC: 3.6 MG/DL (ref 2.5–8.3)
URATE SERPL-MCNC: 4.2 MG/DL (ref 2.5–8.3)
WBC # BLD AUTO: 5.9 K/UL (ref 4.8–10.8)

## 2021-05-10 PROCEDURE — 84100 ASSAY OF PHOSPHORUS: CPT

## 2021-05-10 PROCEDURE — 85027 COMPLETE CBC AUTOMATED: CPT

## 2021-05-10 PROCEDURE — A9270 NON-COVERED ITEM OR SERVICE: HCPCS | Performed by: INTERNAL MEDICINE

## 2021-05-10 PROCEDURE — 80048 BASIC METABOLIC PNL TOTAL CA: CPT

## 2021-05-10 PROCEDURE — 700102 HCHG RX REV CODE 250 W/ 637 OVERRIDE(OP): Performed by: STUDENT IN AN ORGANIZED HEALTH CARE EDUCATION/TRAINING PROGRAM

## 2021-05-10 PROCEDURE — 700102 HCHG RX REV CODE 250 W/ 637 OVERRIDE(OP): Performed by: INTERNAL MEDICINE

## 2021-05-10 PROCEDURE — 700111 HCHG RX REV CODE 636 W/ 250 OVERRIDE (IP): Mod: JG | Performed by: INTERNAL MEDICINE

## 2021-05-10 PROCEDURE — A9270 NON-COVERED ITEM OR SERVICE: HCPCS | Performed by: NURSE PRACTITIONER

## 2021-05-10 PROCEDURE — 99233 SBSQ HOSP IP/OBS HIGH 50: CPT | Performed by: NURSE PRACTITIONER

## 2021-05-10 PROCEDURE — 700105 HCHG RX REV CODE 258: Performed by: INTERNAL MEDICINE

## 2021-05-10 PROCEDURE — A9270 NON-COVERED ITEM OR SERVICE: HCPCS | Performed by: STUDENT IN AN ORGANIZED HEALTH CARE EDUCATION/TRAINING PROGRAM

## 2021-05-10 PROCEDURE — 770004 HCHG ROOM/CARE - ONCOLOGY PRIVATE *

## 2021-05-10 PROCEDURE — 84550 ASSAY OF BLOOD/URIC ACID: CPT

## 2021-05-10 PROCEDURE — 700102 HCHG RX REV CODE 250 W/ 637 OVERRIDE(OP): Performed by: NURSE PRACTITIONER

## 2021-05-10 PROCEDURE — 700111 HCHG RX REV CODE 636 W/ 250 OVERRIDE (IP): Performed by: STUDENT IN AN ORGANIZED HEALTH CARE EDUCATION/TRAINING PROGRAM

## 2021-05-10 RX ORDER — DIPHENHYDRAMINE HYDROCHLORIDE 50 MG/ML
25 INJECTION INTRAMUSCULAR; INTRAVENOUS PRN
Status: ACTIVE | OUTPATIENT
Start: 2021-05-11 | End: 2021-05-12

## 2021-05-10 RX ORDER — ONDANSETRON 2 MG/ML
8 INJECTION INTRAMUSCULAR; INTRAVENOUS ONCE
Status: COMPLETED | OUTPATIENT
Start: 2021-05-10 | End: 2021-05-10

## 2021-05-10 RX ORDER — POTASSIUM CHLORIDE 20 MEQ/1
20 TABLET, EXTENDED RELEASE ORAL ONCE
Status: COMPLETED | OUTPATIENT
Start: 2021-05-10 | End: 2021-05-10

## 2021-05-10 RX ORDER — ACETAMINOPHEN 325 MG/1
650 TABLET ORAL PRN
Status: ACTIVE | OUTPATIENT
Start: 2021-05-11 | End: 2021-05-12

## 2021-05-10 RX ORDER — DIPHENHYDRAMINE HYDROCHLORIDE 50 MG/ML
50 INJECTION INTRAMUSCULAR; INTRAVENOUS ONCE
Status: COMPLETED | OUTPATIENT
Start: 2021-05-11 | End: 2021-05-11

## 2021-05-10 RX ORDER — MEPERIDINE HYDROCHLORIDE 25 MG/ML
25 INJECTION INTRAMUSCULAR; INTRAVENOUS; SUBCUTANEOUS PRN
Status: ACTIVE | OUTPATIENT
Start: 2021-05-11 | End: 2021-05-12

## 2021-05-10 RX ORDER — ACETAMINOPHEN 325 MG/1
650 TABLET ORAL ONCE
Status: COMPLETED | OUTPATIENT
Start: 2021-05-11 | End: 2021-05-11

## 2021-05-10 RX ADMIN — ALLOPURINOL 300 MG: 300 TABLET ORAL at 06:05

## 2021-05-10 RX ADMIN — DOCUSATE SODIUM 50 MG AND SENNOSIDES 8.6 MG 2 TABLET: 8.6; 5 TABLET, FILM COATED ORAL at 17:10

## 2021-05-10 RX ADMIN — ENOXAPARIN SODIUM 40 MG: 40 INJECTION SUBCUTANEOUS at 06:05

## 2021-05-10 RX ADMIN — ATENOLOL 50 MG: 50 TABLET ORAL at 10:12

## 2021-05-10 RX ADMIN — ONDANSETRON 8 MG: 2 INJECTION INTRAMUSCULAR; INTRAVENOUS at 17:24

## 2021-05-10 RX ADMIN — PREDNISONE 120 MG: 20 TABLET ORAL at 17:10

## 2021-05-10 RX ADMIN — CYCLOPHOSPHAMIDE 1530 MG: 2 INJECTION, POWDER, FOR SOLUTION INTRAVENOUS; ORAL at 17:49

## 2021-05-10 RX ADMIN — POTASSIUM CHLORIDE 20 MEQ: 1500 TABLET, EXTENDED RELEASE ORAL at 17:09

## 2021-05-10 RX ADMIN — LORAZEPAM 1 MG: 1 TABLET ORAL at 21:17

## 2021-05-10 RX ADMIN — PREDNISONE 120 MG: 20 TABLET ORAL at 06:05

## 2021-05-10 RX ADMIN — FAMOTIDINE 20 MG: 20 TABLET ORAL at 06:05

## 2021-05-10 RX ADMIN — FAMOTIDINE 20 MG: 20 TABLET ORAL at 17:09

## 2021-05-10 RX ADMIN — DIBASIC SODIUM PHOSPHATE, MONOBASIC POTASSIUM PHOSPHATE AND MONOBASIC SODIUM PHOSPHATE 250 MG: 852; 155; 130 TABLET ORAL at 11:58

## 2021-05-10 RX ADMIN — OXYCODONE 5 MG: 5 TABLET ORAL at 21:17

## 2021-05-10 RX ADMIN — ACYCLOVIR 400 MG: 800 TABLET ORAL at 06:05

## 2021-05-10 RX ADMIN — ACYCLOVIR 400 MG: 800 TABLET ORAL at 17:10

## 2021-05-10 RX ADMIN — DIBASIC SODIUM PHOSPHATE, MONOBASIC POTASSIUM PHOSPHATE AND MONOBASIC SODIUM PHOSPHATE 250 MG: 852; 155; 130 TABLET ORAL at 06:05

## 2021-05-10 RX ADMIN — AMLODIPINE BESYLATE 10 MG: 10 TABLET ORAL at 06:05

## 2021-05-10 RX ADMIN — DOCUSATE SODIUM 50 MG AND SENNOSIDES 8.6 MG 2 TABLET: 8.6; 5 TABLET, FILM COATED ORAL at 06:05

## 2021-05-10 ASSESSMENT — ENCOUNTER SYMPTOMS
CONSTIPATION: 0
WHEEZING: 0
HEARTBURN: 0
NAUSEA: 0
WEAKNESS: 0
INSOMNIA: 0
PALPITATIONS: 0
CLAUDICATION: 0
MYALGIAS: 0
EYES NEGATIVE: 1
FEVER: 0
DEPRESSION: 0
DIZZINESS: 0
NERVOUS/ANXIOUS: 0
HEADACHES: 0
HEMOPTYSIS: 0
ABDOMINAL PAIN: 0
DIARRHEA: 0
WEIGHT LOSS: 0
BRUISES/BLEEDS EASILY: 0
BACK PAIN: 0
SHORTNESS OF BREATH: 0
VOMITING: 0
COUGH: 0
CHILLS: 0
NECK PAIN: 0
SPUTUM PRODUCTION: 0
ORTHOPNEA: 0
BLOOD IN STOOL: 0
FOCAL WEAKNESS: 0

## 2021-05-10 ASSESSMENT — PAIN DESCRIPTION - PAIN TYPE
TYPE: ACUTE PAIN

## 2021-05-10 ASSESSMENT — LIFESTYLE VARIABLES: SUBSTANCE_ABUSE: 0

## 2021-05-10 NOTE — PROGRESS NOTES
Chemotherapy Verification - SECONDARY RN       Height = 175.3cm  Weight = 85.3kg  BSA = 2.04m2       Medication: Cytoxan  Dose: 750mg/m2  Calculated Dose: 1530mg                             (In mg/m2, AUC, mg/kg)         I confirm that this process was performed independently.

## 2021-05-10 NOTE — PROGRESS NOTES
[facility] Medicine Daily Progress Note    Date of Service  5/10/2021    Chief Complaint  70 y.o. male admitted 4/27/2021 with weakness and back pain     Hospital Course  70 y.o. male with past medical history of hypertension on atenolol, CKD who presented 4/27/2021 as a transfer from [facility] for abnormal labs by PMD. Patient sates that he has been having back pain, low grade fevers, night sweats, and weight loss that started in November 2020. He characterizes his low back pain as migratory and dull, 6/10 in intensity, not relieved with anything exacerbated with movement. His PMD is Dr. Singh. On admission he had a calcium level of 14 and was given IVF and IV 4 mg zolendronic acid on admission. Repeat CT abdomen pelvis done without contrast due to patient's kidney function showed a large retroperitoneal mass surrounding the left kidney and obstructing the left ureter, mass also surrounds the aorta superior mesenteric artery and vein. Urology consulted for hydronephrosis.  S/p cystoscopy with ureteral stent insertion. IR biopsy done 4/29.  Pathology preliminary results aggressive B-cell lymphoma, oncology has been consulted.      Interval Problem Update  Creatinine stable at 1.33. Phos 2.2, repleted PO. SBPs 140-163, which is likely related to prednisone. Patient denies HA, dizziness or vision changes. Will continue to monitor for now.     Consultants/Specialty  Urology   IR  Oncology   Palliative Care     Code Status  Full Code    Disposition  Continue inpatient for R-EPOCH. Can likely discharge home on Tuesday afternoon, after regimen complete     Review of Systems  Review of Systems   Constitutional: Negative for chills, fever, malaise/fatigue and weight loss.   Respiratory: Negative for cough, shortness of breath and wheezing.    Cardiovascular: Negative for chest pain, palpitations and leg swelling.   Gastrointestinal: Negative for abdominal pain, blood in stool, constipation, diarrhea, nausea and  vomiting.   Genitourinary: Negative for dysuria, frequency, hematuria and urgency.   Musculoskeletal: Negative for back pain and myalgias.   Neurological: Negative for dizziness, focal weakness, weakness and headaches.   Psychiatric/Behavioral: Negative for depression. The patient is not nervous/anxious and does not have insomnia.    All other systems reviewed and are negative.       Physical Exam  Temp:  [36.7 °C (98 °F)-37.3 °C (99.2 °F)] 36.7 °C (98 °F)  Pulse:  [59-73] 66  Resp:  [16-18] 16  BP: (155-163)/(73-83) 163/73  SpO2:  [98 %-99 %] 99 %    Physical Exam  Vitals and nursing note reviewed.   Constitutional:       General: He is not in acute distress.     Appearance: He is overweight. He is not ill-appearing.   HENT:      Head: Normocephalic and atraumatic.      Right Ear: External ear normal.      Left Ear: External ear normal.      Nose: No congestion or rhinorrhea.      Mouth/Throat:      Mouth: Mucous membranes are moist.      Pharynx: Oropharynx is clear.   Eyes:      General: No scleral icterus.        Right eye: No discharge.         Left eye: No discharge.      Conjunctiva/sclera: Conjunctivae normal.   Cardiovascular:      Rate and Rhythm: Normal rate and regular rhythm.   Pulmonary:      Effort: Pulmonary effort is normal. No respiratory distress.      Breath sounds: Normal breath sounds. No wheezing.   Abdominal:      General: Abdomen is flat. There is no distension.      Palpations: Abdomen is soft.      Tenderness: There is no abdominal tenderness.   Musculoskeletal:         General: No swelling. Normal range of motion.      Cervical back: Normal range of motion and neck supple. No rigidity.   Skin:     General: Skin is warm and dry.      Coloration: Skin is pale.   Neurological:      Mental Status: He is alert, oriented to person, place, and time and easily aroused. Mental status is at baseline.   Psychiatric:         Attention and Perception: Attention normal.         Mood and Affect: Mood  normal.         Speech: Speech normal.         Behavior: Behavior normal. Behavior is cooperative.         Cognition and Memory: Cognition normal.         Fluids  No intake or output data in the 24 hours ending 05/10/21 1004    Laboratory  Recent Labs     05/08/21  0139 05/09/21  0107 05/10/21  0221   WBC 4.9 8.4 5.9   RBC 3.56* 3.91* 3.75*   HEMOGLOBIN 9.0* 9.9* 9.5*   HEMATOCRIT 28.3* 30.6* 29.1*   MCV 79.5* 78.3* 77.6*   MCH 25.3* 25.3* 25.3*   MCHC 31.8* 32.4* 32.6*   RDW 42.1 41.2 40.9   PLATELETCT 254 338 251   MPV 10.3 9.9 9.7     Recent Labs     05/08/21  1238 05/09/21  0107 05/09/21  1214   SODIUM 138 137 137   POTASSIUM 3.5* 3.7 3.7   CHLORIDE 106 105 105   CO2 21 23 22   GLUCOSE 155* 132* 139*   BUN 21 22 21   CREATININE 1.42* 1.28 1.33   CALCIUM 8.6 8.4* 8.2*          Lab Results   Component Value Date/Time    HEPAIGM Non-Reactive 05/01/2021 1210    HEPBSAG Non-Reactive 05/01/2021 1210    HEPBCORIGM Non-Reactive 05/01/2021 1210    HEPCAB Non-Reactive 05/01/2021 1210     Results from last 7 days   Lab Units 05/10/21  0221 05/09/21  1214 05/09/21  0107 05/08/21  1238 05/08/21  0139 05/07/21  0206 05/06/21  0100 05/05/21  0020   URIC ACID 107 mg/dL 4.2 4.4 5.0 5.2 6.1 6.8 7.4 7.4     Results from last 7 days   Lab Units 05/09/21  1214 05/09/21  0107 05/08/21  1238 05/08/21  0139 05/07/21  0206   LDH TOTAL 115 U/L 192 177 196 179 209                 Imaging  CT-CHEST (THORAX) W/O   Final Result         1.  Large confluent presumed herb mass in the retroperitoneum and upper abdomen, and contiguous with retrocrural mass in keeping with biopsy-proven lymphoma.   2.  Small to moderate left and small right pleural effusions.               CT-SOFT TISSUE NECK W/O   Final Result      1.  No significant neck mass or adenopathy is seen on this noncontrast study.   2.  Bilateral pleural effusions.      IR-PICC LINE PLACEMENT W/ GUIDANCE > AGE 5   Final Result                  Ultrasound-guided PICC placement  performed by qualified nursing staff as    above.          EC-ECHOCARDIOGRAM COMPLETE W/O CONT   Final Result      CT-NEEDLE BX-ABD-RETROPERITONL   Final Result      1.  CT GUIDED RIGHT RETROPERITONEAL BIOPSY. MASSIVE RIGHT-SIDED PARA-AORTIC ADENOPATHY WAS TARGETED.      DX-CYSTO FLUORO > 1 HOUR   Final Result      Cysto fluoroscopy utilized for 36 seconds         INTERPRETING LOCATION: [address], [place], [address]      CT-ABDOMEN-PELVIS W/O   Final Result      1.  Very large retroperitoneal mass surrounding LEFT kidney and obstructing LEFT ureter.  Mass also surrounds the aorta, superior mesenteric artery and vein.   2.  Nonobstructing RIGHT kidney stone.   3.  Minimal ascites.   4.  Evaluation is limited due to lack of IV contrast.      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)  Assessment & Plan  Started R-EPOCH 5/6 , will complete course tomorrow morning and should be able to discharge after   Monitor for tumor lysis syndrome with Q12 BMP, phos, LDH, uric acid   Monitor renal function closely  Symptom management     Retroperitoneal mass  Assessment & Plan  Prelim pathology positive for aggressive B-cell lymphoma, pending final results   s/p biopsy   -echo: 65% EF  -bone marrow bx completed, results pending  -uric acid, LDH within normal limits, hepatitis, and HIV negative  -back pain management  -PICC line in place       Acute kidney injury superimposed on CKD (HCC)  Assessment & Plan  Probably secondary to left sided hydronephrosis causing post obstruction failure  S/p ureteral stent   Monitor Cr closely during chemotherapy   Maintain hydration    Hypophosphatemia  Assessment & Plan  K 2.2   Repleted PO   Monitor on BMP     Anemia of chronic disease- (present on admission)  Assessment & Plan  H/H trending down secondary to chemotherapy   Monitor with CBC    Transfuse for Hgb < 7     Hypertension  Assessment & Plan  Uncontrolled, SBPs 140-160s   Continue with atenolol and amlodipine  Will hold off on any medication adjustments as patient is asymptomatic and I suspect this will resolve once chemotherapy completed and off of prednisone     Hydronephrosis  Assessment & Plan  From mass causing obstruction.  s/p cystoscopy and ureteral stent placement 4/28   improving         VTE prophylaxis: Lovenox     I have performed a physical exam and reviewed and updated ROS and Plan today (5/10/2021). In review of yesterday's note (5/9/2021), there are no changes except as documented above.     SHEREE Coles.

## 2021-05-10 NOTE — CARE PLAN
Problem: Communication  Goal: The ability to communicate needs accurately and effectively will improve  Outcome: PROGRESSING AS EXPECTED  Note: Patient able to communicate needs accurately and effectively as they arise.      Problem: Infection  Goal: Will remain free from infection  Outcome: PROGRESSING AS EXPECTED  Note: Patient afebrile, no s/sx infection. Hand hygiene and infection prevention protocols in place.

## 2021-05-10 NOTE — PROGRESS NOTES
"Pharmacy Chemotherapy Verification    Patient Name: Khalif Kirkpatrick   Diagnosis: Aggressive B Cell Lymphoma    Protocol: R-EPOCH,   Starting with Dose Level 1, exceptions outlined below   RiTUXimab-pvvr 375 mg/m2 IV on   Day 5   -5/6/21 - Day 5 for Cycle 1 due to risk of TLS for Cycle 1 per Dr. Pond  Etoposide  IV continuous infusion over 24 hours daily on Days 1 - 4  -5/6/21 - Dose reduced to 35 mg/m2 due to renal impairment  DOXOrubicin 10 mg/m2 IV continuous infusion over 24 hours daily on Days 1 - 4  VinCRIStine 0.4 mg/m2 IV continuous infusion over 24 hour daily on Days 1 - 4  Cyclophosphamide 750 mg/m2 IV over 30 minutes on Day 5  Prednisone 60 mg/m2 PO twice daily on Days 1 - 5  21-day cycle for 6 cycles    Dose Adjustment based on twice weekly CBCs, at least 3 days apart:  Vimal measurements Dose-adjustment   If vimal ANC > 0.5 x 109/L Increase 1 dose level  in etoposide, doxorubicin, and cyclophosphamide above last cycle   If vimal ANC < 0.5 x 109/L on 1 or 2 measurements Same dose(s) as last cycle   If vimal ANC < 0.5 x 109/L on at least 3 measurements  Decrease 1 dose level in etoposide, doxorubicin, and cyclophosphamide below last cycle   OR     If vimal platelet count < 25 x 109/L on 1 measurement Decrease 1 dose level in etoposide, doxorubicin, and cyclophosphamide below last cycle     • Dose adjustments above starting dose level (level 1) apply to etoposide, doxorubicin and cyclophosphamide  • Dose adjustments below starting dose level (level 1) apply to cyclophosphamide only.   NCCN Guidelines for B-Cell Lymphomas V.1.2019.  Abimael HUSSEIN, et al. Blood. 2002;99(8):2685-93.  Abimael HUSSEIN, et al. J Clin Oncol. 2008;26(16):2717-24.    Allergies: Ibuprofen       BP (!) 163/73   Pulse 66   Temp 36.7 °C (98 °F) (Temporal)   Resp 16   Ht 1.753 m (5' 9\")   Wt 85.3 kg (188 lb 0.8 oz)   SpO2 99%   BMI 27.77 kg/m²  Body surface area is 2.04 meters squared.     ECHO 5/2/21  LVEF 65%      5/1/2021 12:10   "   Hepatitis B Surface Antigen Non-Reactive   Hepatitis B Cors Ab,IgM Non-Reactive     All lab results 5/10/21 within treatment parameters.     Drug Order   (Drug name, dose, route, IV Fluid & volume, frequency, number of doses) Cycle 1, Day 5 of 5      Previous treatment: N/A     Medication = cyclophosphamide(cytoxan)  Base Dose = 750mg/m2  Calc Dose: Base Dose x 2.04 m2 = 1530mg  Final Dose = 1530mg  Route = IV  Fluid & Volume =   Admin Duration = Over 30 min         <10% difference, okay to treat with final dose   Medication = rituximab (ruxience)  Base Dose = 375mg/m2  Calc Dose:Base Dose x 2.04 m2 = 765mg  Final Dose = 700 mg  Route = IV  Fluid & Volume = NS 250mL  Admin Duration = Per rate sheet   In am 5/11/21 per Dr. Minor      <10% difference, okay to treat with final dose   By my signature below, I confirm this process was performed independently with the BSA and all final chemotherapy dosing calculations congruent. I have reviewed the above chemotherapy order and that my calculation of the final dose and BSA (when applicable) corroborate those calculations of the  pharmacist. Discrepancies of 10% or greater in the written dose have been addressed and documented within the [facility] Progress notes.    Sang Whitaker, RejiD

## 2021-05-10 NOTE — PROGRESS NOTES
Chemotherapy Verification - PRIMARY RN      Height = 175.3cm  Weight = 85.3kg  BSA = 2.04m2       Medication: Cyclophosphamide  Dose: 750mg/m2  Calculated Dose: 1530mg (ordered dose: 1530mg)                             (In mg/m2, AUC, mg/kg)       I confirm this process was performed independently with the BSA and all final chemotherapy dosing calculations congruent.  Any discrepancies of 10% or greater have been addressed with the chemotherapy pharmacist. The resolution of the discrepancy has been documented in the EPIC progress notes.

## 2021-05-10 NOTE — PROGRESS NOTES
Oncology/Hematology Progress Note               Author: Johnson Minor M.D. Date & Time created: 5/10/2021  11:58 AM     Interval History:  No events overnight, he has been tolerating treatment fairly well, results from fish studies pending.  There is no shortness of breath, chest pain, cough.    PMHx, PSurgHx, SocHx, FAMHx: Reviewed and unchanged    Review of Systems:  Review of Systems   Constitutional: Positive for malaise/fatigue. Negative for chills and fever.   HENT: Negative.    Eyes: Negative.    Respiratory: Negative for cough, hemoptysis, sputum production and shortness of breath.    Cardiovascular: Negative for chest pain, palpitations, orthopnea, claudication and leg swelling.   Gastrointestinal: Negative for heartburn, nausea and vomiting.   Genitourinary: Negative for dysuria, frequency and urgency.   Musculoskeletal: Negative for back pain, myalgias and neck pain.   Skin: Negative.    Neurological: Negative for dizziness and headaches.   Endo/Heme/Allergies: Does not bruise/bleed easily.   Psychiatric/Behavioral: Negative for depression, substance abuse and suicidal ideas.   All other systems reviewed and are negative.      Physical Exam:  Physical Exam  Constitutional:       Appearance: Normal appearance. He is not ill-appearing.   HENT:      Head: Normocephalic and atraumatic.      Right Ear: External ear normal.      Left Ear: External ear normal.      Nose: Nose normal.      Mouth/Throat:      Mouth: Mucous membranes are moist.   Eyes:      General: No scleral icterus.     Conjunctiva/sclera: Conjunctivae normal.   Cardiovascular:      Rate and Rhythm: Normal rate and regular rhythm.      Heart sounds: No murmur.   Pulmonary:      Effort: Pulmonary effort is normal. No respiratory distress.      Breath sounds: Normal breath sounds.   Abdominal:      General: Abdomen is flat. Bowel sounds are normal.      Palpations: Abdomen is soft.      Tenderness: There is no abdominal tenderness.    Musculoskeletal:         General: No swelling or tenderness.      Cervical back: Normal range of motion and neck supple.   Skin:     General: Skin is warm and dry.   Neurological:      General: No focal deficit present.      Mental Status: He is alert and oriented to person, place, and time.   Psychiatric:         Mood and Affect: Mood normal.         Behavior: Behavior normal.         Thought Content: Thought content normal.         Judgment: Judgment normal.         Labs:          Recent Labs     05/08/21  1238 05/08/21  1238 05/09/21  0107 05/09/21  1214 05/10/21  0221   SODIUM 138  --  137 137  --    POTASSIUM 3.5*  --  3.7 3.7  --    CHLORIDE 106  --  105 105  --    CO2   --    --    BUN   --    --    CREATININE 1.42*  --  1.28 1.33  --    PHOSPHORUS 2.6   < > 2.7 2.2* 2.7   CALCIUM 8.6  --  8.4* 8.2*  --     < > = values in this interval not displayed.     Recent Labs     21   ALTSGPT  --  9  --    ASTSGOT  --  15  --    ALKPHOSPHAT  --  73  --    TBILIRUBIN  --  0.2  --    GLUCOSE 155* 132* 139*     Recent Labs     05/08/21  0139 05/09/21  0107 05/10/21  0221   RBC 3.56* 3.91* 3.75*   HEMOGLOBIN 9.0* 9.9* 9.5*   HEMATOCRIT 28.3* 30.6* 29.1*   PLATELETCT 254 338 251     Recent Labs     05/08/21  0139 05/09/21  0107 05/10/21  022   WBC 4.9 8.4 5.9   ASTSGOT  --  15  --    ALTSGPT  --  9  --    ALKPHOSPHAT  --  73  --    TBILIRUBIN  --  0.2  --      Recent Labs     21  121   SODIUM 138 137 137   POTASSIUM 3.5* 3.7 3.7   CHLORIDE 106 105 105   CO2    GLUCOSE 155* 132* 139*   BUN    CREATININE 1.42* 1.28 1.33   CALCIUM 8.6 8.4* 8.2*     Hemodynamics:  Temp (24hrs), Av.1 °C (98.7 °F), Min:36.7 °C (98 °F), Max:37.3 °C (99.2 °F)  Temperature: 36.7 °C (98 °F)  Pulse  Av.1  Min: 57  Max: 96   Blood Pressure : (!) 163/73     Respiratory:    Respiration: 16, Pulse Oximetry: 99 %        RLL Breath  Sounds: Diminished, LLL Breath Sounds: Diminished  Fluids:    Intake/Output Summary (Last 24 hours) at 5/3/2021 1253  Last data filed at 5/2/2021 1545  Gross per 24 hour   Intake 240 ml   Output 200 ml   Net 40 ml        GI/Nutrition:  Orders Placed This Encounter   Procedures   • Diet Order Diet: Regular     Standing Status:   Standing     Number of Occurrences:   1     Order Specific Question:   Diet:     Answer:   Regular [1]     Medical Decision Making, by Problem:  Active Hospital Problems    Diagnosis    • *Retroperitoneal mass [R19.00]    • Hypercalcemia [E83.52]    • Acute kidney injury superimposed on CKD (HCC) [N17.9, N18.9]    • Hydronephrosis [N13.30]    • Pleural effusion on left [J90]    • Hypertension [I10]        Assessment and Plan:  69 y/o otherwise healthy man presented with pain found to have a very large RP mass. This was biopsied on 4/29/21 and showed aggressive B-cell lymphoma, final path pending.    # Aggressive B-cell Lymphoma (CD20 positive), FISH results pending - large RP mass on imaging causing renal dysfunction, s/p stent. Reviewed the diagnosis in detail with the patient and his wife.   - ECHO normal EF of 65%  - PET/CT scan, - pt declines even with sedation, will order CT scans to complete staging imaging, pt understands this is not the preferred staging evaluation for his lymphoma but adamantly declines a PET/CT scan.  - Bone marrow biopsy - completed on 5/3/21, normal prelim  - PICC line placement - completed  - Continue allopurinol  - CT chest and neck normal  -5/6/21 Cycle 1 R-EPOCH , Rituximab to be given on Day 5 for cycle 1 due to risk of TLS.  - Decrease dose of etoposide by 25% due to renal dysfunction      # Acute Kidney Injury - 2/2 RP mass  - s/p stent placement, creatinine improving, monitor hopefully gets near normal prior to starting chemotherapy, this continues to improve    # Anemia, microcytic - Hemoglobin stable around 9  - monitor    # PPX: acyclovir and  allopurinol  - monitor labs q 12 hours - no issues      Plan  -We will proceed with day 5/5 today.  Will be starting Rituxan and day 6 tomorrow at 9 AM, discharge planning tomorrow  -Continue with allopurinol,  -prophylactic antibiotics as outpatient  -upon discharge patient will need to follow with Infusion for blood checks Monday, Wednesday and Fridays  -Fish panel pending  -Continue checking daily labs  -Kidney function stable    High complexity, complex decision making      Quality-Core Measures

## 2021-05-10 NOTE — CARE PLAN
Problem: Discharge Barriers/Planning  Goal: Patient's continuum of care needs will be met  Outcome: PROGRESSING AS EXPECTED  Intervention: Assess potential discharge barriers on admission and throughout hospital stay  Note: Pt can d/c tomorrow after he finishes his chemotherapy if medically cleared.  Will make patient a [facility] appointment in [facility]     Problem: Pain Management  Goal: Pain level will decrease to patient's comfort goal  Outcome: PROGRESSING AS EXPECTED  Intervention: Follow pain managment plan developed in collaboration with patient and Interdisciplinary Team  Note: Pt denies c/o pain currently  PRN medications available if needed

## 2021-05-11 PROBLEM — E83.39 HYPOPHOSPHATEMIA: Status: RESOLVED | Noted: 2021-05-10 | Resolved: 2021-05-11

## 2021-05-11 PROBLEM — N18.9 ACUTE KIDNEY INJURY SUPERIMPOSED ON CKD (HCC): Status: RESOLVED | Noted: 2021-04-28 | Resolved: 2021-05-11

## 2021-05-11 PROBLEM — N17.9 ACUTE KIDNEY INJURY SUPERIMPOSED ON CKD (HCC): Status: RESOLVED | Noted: 2021-04-28 | Resolved: 2021-05-11

## 2021-05-11 PROBLEM — N13.30 HYDRONEPHROSIS: Status: RESOLVED | Noted: 2021-04-28 | Resolved: 2021-05-11

## 2021-05-11 LAB
ANION GAP SERPL CALC-SCNC: 11 MMOL/L (ref 7–16)
BASOPHILS # BLD AUTO: 0.2 % (ref 0–1.8)
BASOPHILS # BLD: 0.01 K/UL (ref 0–0.12)
BUN SERPL-MCNC: 23 MG/DL (ref 8–22)
CALCIUM SERPL-MCNC: 7.4 MG/DL (ref 8.5–10.5)
CHLORIDE SERPL-SCNC: 104 MMOL/L (ref 96–112)
CO2 SERPL-SCNC: 23 MMOL/L (ref 20–33)
CREAT SERPL-MCNC: 1.13 MG/DL (ref 0.5–1.4)
EKG IMPRESSION: NORMAL
EOSINOPHIL # BLD AUTO: 0 K/UL (ref 0–0.51)
EOSINOPHIL NFR BLD: 0 % (ref 0–6.9)
ERYTHROCYTE [DISTWIDTH] IN BLOOD BY AUTOMATED COUNT: 40.6 FL (ref 35.9–50)
GLUCOSE SERPL-MCNC: 147 MG/DL (ref 65–99)
HCT VFR BLD AUTO: 28.6 % (ref 42–52)
HGB BLD-MCNC: 9.3 G/DL (ref 14–18)
IMM GRANULOCYTES # BLD AUTO: 0.04 K/UL (ref 0–0.11)
IMM GRANULOCYTES NFR BLD AUTO: 0.9 % (ref 0–0.9)
LYMPHOCYTES # BLD AUTO: 0.21 K/UL (ref 1–4.8)
LYMPHOCYTES NFR BLD: 4.6 % (ref 22–41)
MAGNESIUM SERPL-MCNC: 1.8 MG/DL (ref 1.5–2.5)
MCH RBC QN AUTO: 24.9 PG (ref 27–33)
MCHC RBC AUTO-ENTMCNC: 32.5 G/DL (ref 33.7–35.3)
MCV RBC AUTO: 76.7 FL (ref 81.4–97.8)
MONOCYTES # BLD AUTO: 0.04 K/UL (ref 0–0.85)
MONOCYTES NFR BLD AUTO: 0.9 % (ref 0–13.4)
NEUTROPHILS # BLD AUTO: 4.26 K/UL (ref 1.82–7.42)
NEUTROPHILS NFR BLD: 93.4 % (ref 44–72)
NRBC # BLD AUTO: 0 K/UL
NRBC BLD-RTO: 0 /100 WBC
PHOSPHATE SERPL-MCNC: 2.3 MG/DL (ref 2.5–4.5)
PHOSPHATE SERPL-MCNC: 2.7 MG/DL (ref 2.5–4.5)
PLATELET # BLD AUTO: 246 K/UL (ref 164–446)
PMV BLD AUTO: 9.8 FL (ref 9–12.9)
POTASSIUM SERPL-SCNC: 3 MMOL/L (ref 3.6–5.5)
RBC # BLD AUTO: 3.73 M/UL (ref 4.7–6.1)
SODIUM SERPL-SCNC: 138 MMOL/L (ref 135–145)
TROPONIN T SERPL-MCNC: 12 NG/L (ref 6–19)
URATE SERPL-MCNC: 3.2 MG/DL (ref 2.5–8.3)
URATE SERPL-MCNC: 3.5 MG/DL (ref 2.5–8.3)
WBC # BLD AUTO: 4.6 K/UL (ref 4.8–10.8)

## 2021-05-11 PROCEDURE — 700102 HCHG RX REV CODE 250 W/ 637 OVERRIDE(OP): Performed by: INTERNAL MEDICINE

## 2021-05-11 PROCEDURE — 700102 HCHG RX REV CODE 250 W/ 637 OVERRIDE(OP): Performed by: STUDENT IN AN ORGANIZED HEALTH CARE EDUCATION/TRAINING PROGRAM

## 2021-05-11 PROCEDURE — A9270 NON-COVERED ITEM OR SERVICE: HCPCS | Performed by: STUDENT IN AN ORGANIZED HEALTH CARE EDUCATION/TRAINING PROGRAM

## 2021-05-11 PROCEDURE — 80048 BASIC METABOLIC PNL TOTAL CA: CPT

## 2021-05-11 PROCEDURE — 99233 SBSQ HOSP IP/OBS HIGH 50: CPT | Performed by: INTERNAL MEDICINE

## 2021-05-11 PROCEDURE — 770004 HCHG ROOM/CARE - ONCOLOGY PRIVATE *

## 2021-05-11 PROCEDURE — 700111 HCHG RX REV CODE 636 W/ 250 OVERRIDE (IP): Mod: JG | Performed by: INTERNAL MEDICINE

## 2021-05-11 PROCEDURE — 84550 ASSAY OF BLOOD/URIC ACID: CPT

## 2021-05-11 PROCEDURE — 83735 ASSAY OF MAGNESIUM: CPT

## 2021-05-11 PROCEDURE — RXMED WILLOW AMBULATORY MEDICATION CHARGE: Performed by: INTERNAL MEDICINE

## 2021-05-11 PROCEDURE — A9270 NON-COVERED ITEM OR SERVICE: HCPCS | Performed by: INTERNAL MEDICINE

## 2021-05-11 PROCEDURE — 700105 HCHG RX REV CODE 258: Performed by: INTERNAL MEDICINE

## 2021-05-11 PROCEDURE — 84100 ASSAY OF PHOSPHORUS: CPT

## 2021-05-11 PROCEDURE — 93005 ELECTROCARDIOGRAM TRACING: CPT | Performed by: INTERNAL MEDICINE

## 2021-05-11 PROCEDURE — 85025 COMPLETE CBC W/AUTO DIFF WBC: CPT

## 2021-05-11 PROCEDURE — 84484 ASSAY OF TROPONIN QUANT: CPT

## 2021-05-11 PROCEDURE — 93010 ELECTROCARDIOGRAM REPORT: CPT | Performed by: INTERNAL MEDICINE

## 2021-05-11 PROCEDURE — 700111 HCHG RX REV CODE 636 W/ 250 OVERRIDE (IP): Performed by: STUDENT IN AN ORGANIZED HEALTH CARE EDUCATION/TRAINING PROGRAM

## 2021-05-11 PROCEDURE — 700111 HCHG RX REV CODE 636 W/ 250 OVERRIDE (IP): Performed by: INTERNAL MEDICINE

## 2021-05-11 RX ORDER — 0.9 % SODIUM CHLORIDE 0.9 %
3 VIAL (ML) INJECTION PRN
Status: CANCELLED | OUTPATIENT
Start: 2021-05-12

## 2021-05-11 RX ORDER — 0.9 % SODIUM CHLORIDE 0.9 %
10 VIAL (ML) INJECTION PRN
Status: CANCELLED | OUTPATIENT
Start: 2021-05-12

## 2021-05-11 RX ORDER — ACYCLOVIR 800 MG/1
400 TABLET ORAL 2 TIMES DAILY
Status: COMPLETED | OUTPATIENT
Start: 2021-05-11 | End: 2021-05-12

## 2021-05-11 RX ORDER — HEPARIN SODIUM (PORCINE) LOCK FLUSH IV SOLN 100 UNIT/ML 100 UNIT/ML
500 SOLUTION INTRAVENOUS PRN
Status: CANCELLED | OUTPATIENT
Start: 2021-05-12

## 2021-05-11 RX ORDER — FLUCONAZOLE 100 MG/1
100 TABLET ORAL DAILY
Status: COMPLETED | OUTPATIENT
Start: 2021-05-11 | End: 2021-05-11

## 2021-05-11 RX ORDER — SODIUM CHLORIDE 9 MG/ML
INJECTION, SOLUTION INTRAVENOUS CONTINUOUS
Status: CANCELLED | OUTPATIENT
Start: 2021-05-12

## 2021-05-11 RX ORDER — FLUCONAZOLE 100 MG/1
100 TABLET ORAL DAILY
Qty: 21 TABLET | Refills: 0 | Status: SHIPPED | OUTPATIENT
Start: 2021-05-11

## 2021-05-11 RX ORDER — ACYCLOVIR 400 MG/1
400 TABLET ORAL 2 TIMES DAILY
Qty: 42 TABLET | Refills: 0 | Status: SHIPPED | OUTPATIENT
Start: 2021-05-11

## 2021-05-11 RX ORDER — ACETAMINOPHEN 325 MG/1
650 TABLET ORAL ONCE
Status: CANCELLED | OUTPATIENT
Start: 2021-05-12

## 2021-05-11 RX ORDER — POTASSIUM CHLORIDE 7.45 MG/ML
10 INJECTION INTRAVENOUS
Status: COMPLETED | OUTPATIENT
Start: 2021-05-11 | End: 2021-05-11

## 2021-05-11 RX ORDER — 0.9 % SODIUM CHLORIDE 0.9 %
VIAL (ML) INJECTION PRN
Status: CANCELLED | OUTPATIENT
Start: 2021-05-12

## 2021-05-11 RX ORDER — LEVOFLOXACIN 500 MG/1
500 TABLET, FILM COATED ORAL EVERY 24 HOURS
Status: COMPLETED | OUTPATIENT
Start: 2021-05-11 | End: 2021-05-11

## 2021-05-11 RX ORDER — LEVOFLOXACIN 500 MG/1
500 TABLET, FILM COATED ORAL DAILY
Qty: 21 TABLET | Refills: 0 | Status: SHIPPED | OUTPATIENT
Start: 2021-05-11

## 2021-05-11 RX ORDER — DIPHENHYDRAMINE HYDROCHLORIDE 50 MG/ML
25 INJECTION INTRAMUSCULAR; INTRAVENOUS PRN
Status: CANCELLED | OUTPATIENT
Start: 2021-05-12

## 2021-05-11 RX ORDER — ACETAMINOPHEN 325 MG/1
650 TABLET ORAL PRN
Status: CANCELLED | OUTPATIENT
Start: 2021-05-12

## 2021-05-11 RX ADMIN — FLUCONAZOLE 100 MG: 100 TABLET ORAL at 14:00

## 2021-05-11 RX ADMIN — POTASSIUM CHLORIDE 10 MEQ: 7.46 INJECTION, SOLUTION INTRAVENOUS at 17:01

## 2021-05-11 RX ADMIN — OXYCODONE 5 MG: 5 TABLET ORAL at 21:13

## 2021-05-11 RX ADMIN — POTASSIUM CHLORIDE 10 MEQ: 7.46 INJECTION, SOLUTION INTRAVENOUS at 18:02

## 2021-05-11 RX ADMIN — DOCUSATE SODIUM 50 MG AND SENNOSIDES 8.6 MG 2 TABLET: 8.6; 5 TABLET, FILM COATED ORAL at 05:48

## 2021-05-11 RX ADMIN — PREDNISONE 120 MG: 20 TABLET ORAL at 05:48

## 2021-05-11 RX ADMIN — LORAZEPAM 1 MG: 1 TABLET ORAL at 21:13

## 2021-05-11 RX ADMIN — POTASSIUM CHLORIDE 10 MEQ: 7.46 INJECTION, SOLUTION INTRAVENOUS at 21:12

## 2021-05-11 RX ADMIN — ACYCLOVIR 400 MG: 800 TABLET ORAL at 05:48

## 2021-05-11 RX ADMIN — POTASSIUM CHLORIDE 10 MEQ: 7.46 INJECTION, SOLUTION INTRAVENOUS at 19:54

## 2021-05-11 RX ADMIN — FAMOTIDINE 20 MG: 20 TABLET ORAL at 17:01

## 2021-05-11 RX ADMIN — ACYCLOVIR 400 MG: 800 TABLET ORAL at 17:01

## 2021-05-11 RX ADMIN — SODIUM CHLORIDE 700 MG: 9 INJECTION, SOLUTION INTRAVENOUS at 11:16

## 2021-05-11 RX ADMIN — DOCUSATE SODIUM 50 MG AND SENNOSIDES 8.6 MG 2 TABLET: 8.6; 5 TABLET, FILM COATED ORAL at 17:01

## 2021-05-11 RX ADMIN — LEVOFLOXACIN 500 MG: 500 TABLET, FILM COATED ORAL at 14:00

## 2021-05-11 RX ADMIN — ACETAMINOPHEN 650 MG: 325 TABLET, FILM COATED ORAL at 10:25

## 2021-05-11 RX ADMIN — AMLODIPINE BESYLATE 10 MG: 10 TABLET ORAL at 05:48

## 2021-05-11 RX ADMIN — DIPHENHYDRAMINE HYDROCHLORIDE 50 MG: 50 INJECTION INTRAMUSCULAR; INTRAVENOUS at 10:26

## 2021-05-11 RX ADMIN — FAMOTIDINE 20 MG: 20 TABLET ORAL at 05:48

## 2021-05-11 RX ADMIN — ALLOPURINOL 300 MG: 300 TABLET ORAL at 05:48

## 2021-05-11 RX ADMIN — ENOXAPARIN SODIUM 40 MG: 40 INJECTION SUBCUTANEOUS at 05:48

## 2021-05-11 RX ADMIN — ATENOLOL 50 MG: 50 TABLET ORAL at 10:25

## 2021-05-11 ASSESSMENT — ENCOUNTER SYMPTOMS
HEMOPTYSIS: 0
NEUROLOGICAL NEGATIVE: 1
FEVER: 0
HEARTBURN: 0
PSYCHIATRIC NEGATIVE: 1
RESPIRATORY NEGATIVE: 1
NECK PAIN: 0
GASTROINTESTINAL NEGATIVE: 1
PALPITATIONS: 0
VOMITING: 0
CARDIOVASCULAR NEGATIVE: 1
COUGH: 0
BRUISES/BLEEDS EASILY: 0
MYALGIAS: 0
CLAUDICATION: 0
ORTHOPNEA: 0
HEADACHES: 0
NAUSEA: 0
DIZZINESS: 0
SHORTNESS OF BREATH: 0
BACK PAIN: 0
DEPRESSION: 0
MUSCULOSKELETAL NEGATIVE: 1
CHILLS: 0
SPUTUM PRODUCTION: 0
EYES NEGATIVE: 1

## 2021-05-11 ASSESSMENT — PAIN DESCRIPTION - PAIN TYPE
TYPE: ACUTE PAIN
TYPE: ACUTE PAIN

## 2021-05-11 ASSESSMENT — LIFESTYLE VARIABLES: SUBSTANCE_ABUSE: 0

## 2021-05-11 NOTE — PROGRESS NOTES
Chemotherapy Verification - SECONDARY RN       Height = 175.3cm  Weight = 85.3kg  BSA = 2.04m2       Medication: Rituximab  Dose: 375mg/m2  Calculated Dose: 765mg (700mg=ordered dose)                             (In mg/m2, AUC, mg/kg)         I confirm that this process was performed independently.

## 2021-05-11 NOTE — PROGRESS NOTES
Oncology/Hematology Progress Note               Author: Johnson Minor M.D. Date & Time created: 5/11/2021  12:35 PM     Interval History:  Overall no new complaints, he will be finishing with Rituxan today, the patient will be ready to discharge home today or tomorrow, he lives in [facility].  FISH panel negative for double/triple hit.  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 Negative for chills and fever.   HENT: Negative.    Eyes: Negative.    Respiratory: Negative for cough, hemoptysis, sputum production and shortness of breath.    Cardiovascular: Negative for chest pain, palpitations, orthopnea, claudication and leg swelling.   Gastrointestinal: Negative for heartburn, nausea and vomiting.   Genitourinary: Negative for dysuria, frequency and urgency.   Musculoskeletal: Negative for back pain, myalgias and neck pain.   Skin: Negative.    Neurological: Negative for dizziness and headaches.   Endo/Heme/Allergies: Does not bruise/bleed easily.   Psychiatric/Behavioral: Negative for depression, substance abuse and suicidal ideas.   All other systems reviewed and are negative.      Physical Exam:  Physical Exam  Constitutional:       Appearance: Normal appearance. He is not ill-appearing.   HENT:      Head: Normocephalic and atraumatic.      Right Ear: External ear normal.      Left Ear: External ear normal.      Nose: Nose normal.      Mouth/Throat:      Mouth: Mucous membranes are moist.   Eyes:      General: No scleral icterus.     Conjunctiva/sclera: Conjunctivae normal.   Cardiovascular:      Rate and Rhythm: Normal rate and regular rhythm.      Heart sounds: No murmur.   Pulmonary:      Effort: Pulmonary effort is normal. No respiratory distress.      Breath sounds: Normal breath sounds.   Abdominal:      General: Abdomen is flat. Bowel sounds are normal.      Palpations:  Abdomen is soft.      Tenderness: There is no abdominal tenderness.   Musculoskeletal:         General: No swelling or tenderness.      Cervical back: Normal range of motion and neck supple.   Skin:     General: Skin is warm and dry.   Neurological:      General: No focal deficit present.      Mental Status: He is alert and oriented to person, place, and time.   Psychiatric:         Mood and Affect: Mood normal.         Behavior: Behavior normal.         Thought Content: Thought content normal.         Judgment: Judgment normal.         Labs:          Recent Labs     05/09/21  0107 05/09/21  0107 05/09/21  1214 05/09/21  1214 05/10/21  0221 05/10/21  1209 05/11/21  0122   SODIUM 137  --  137  --   --  138  --    POTASSIUM 3.7  --  3.7  --   --  3.4*  --    CHLORIDE 105  --  105  --   --  106  --    CO2 23  --  22  --   --  24  --    BUN 22  --  21  --   --  22  --    CREATININE 1.28  --  1.33  --   --  1.19  --    PHOSPHORUS 2.7   < > 2.2*   < > 2.7 2.5 2.7   CALCIUM 8.4*  --  8.2*  --   --  7.8*  --     < > = values in this interval not displayed.     Recent Labs     05/09/21  0107 05/09/21  1214 05/10/21  1209   ALTSGPT 9  --   --    ASTSGOT 15  --   --    ALKPHOSPHAT 73  --   --    TBILIRUBIN 0.2  --   --    GLUCOSE 132* 139* 127*     Recent Labs     05/09/21  0107 05/10/21  0221 05/11/21  0122   RBC 3.91* 3.75* 3.73*   HEMOGLOBIN 9.9* 9.5* 9.3*   HEMATOCRIT 30.6* 29.1* 28.6*   PLATELETCT 338 251 246     Recent Labs     05/09/21  0107 05/10/21  0221 05/11/21  0122   WBC 8.4 5.9 4.6*   NEUTSPOLYS  --   --  93.40*   LYMPHOCYTES  --   --  4.60*   MONOCYTES  --   --  0.90   EOSINOPHILS  --   --  0.00   BASOPHILS  --   --  0.20   ASTSGOT 15  --   --    ALTSGPT 9  --   --    ALKPHOSPHAT 73  --   --    TBILIRUBIN 0.2  --   --      Recent Labs     05/09/21  0107 05/09/21  1214 05/10/21  1209   SODIUM 137 137 138   POTASSIUM 3.7 3.7 3.4*   CHLORIDE 105 105 106   CO2 23 22 24   GLUCOSE 132* 139* 127*   BUN 22 21 22    CREATININE 1.28 1.33 1.19   CALCIUM 8.4* 8.2* 7.8*     Hemodynamics:  Temp (24hrs), Av.1 °C (98.8 °F), Min:36.9 °C (98.5 °F), Max:37.2 °C (99 °F)  Temperature: 37 °C (98.6 °F)  Pulse  Av.7  Min: 57  Max: 96   Blood Pressure : 151/78     Respiratory:    Respiration: 20, Pulse Oximetry: 98 %        RLL Breath Sounds: Diminished, LLL Breath Sounds: Diminished  Fluids:    Intake/Output Summary (Last 24 hours) at 5/3/2021 1253  Last data filed at 2021 1545  Gross per 24 hour   Intake 240 ml   Output 200 ml   Net 40 ml        GI/Nutrition:  Orders Placed This Encounter   Procedures   • Diet Order Diet: Regular     Standing Status:   Standing     Number of Occurrences:   1     Order Specific Question:   Diet:     Answer:   Regular [1]     Medical Decision Making, by Problem:  Active Hospital Problems    Diagnosis    • *Retroperitoneal mass [R19.00]    • Hypercalcemia [E83.52]    • Acute kidney injury superimposed on CKD (HCC) [N17.9, N18.9]    • Hydronephrosis [N13.30]    • Pleural effusion on left [J90]    • Hypertension [I10]        Assessment and Plan:  71 y/o otherwise healthy man presented with pain found to have a very large RP mass. This was biopsied on 21 and showed aggressive B-cell lymphoma, final path pending.    # Aggressive B-cell Lymphoma (CD20 positive), FISH results pending - large RP mass on imaging causing renal dysfunction, s/p stent. Reviewed the diagnosis in detail with the patient and his wife.   - ECHO normal EF of 65%  - PET/CT scan, - pt declines even with sedation, will order CT scans to complete staging imaging, pt understands this is not the preferred staging evaluation for his lymphoma but adamantly declines a PET/CT scan.  - Bone marrow biopsy - completed on 5/3/21, normal prelim  - PICC line placement - completed  - Continue allopurinol  - CT chest and neck normal  -21 Cycle 1 R-EPOCH , Rituximab to be given on Day 5 for cycle 1 due to risk of TLS.  - Decrease dose of  etoposide by 25% due to renal dysfunction  -FISH BCL-2 rearrangement, MYC rearrangement or amplification not detected (no double/triple hit    # Acute Kidney Injury - 2/2 RP mass  - s/p stent placement, creatinine improving, monitor hopefully gets near normal prior to starting chemotherapy, this continues to improve    # Anemia, microcytic - Hemoglobin stable around 9  - monitor    # PPX: acyclovir and allopurinol  - monitor labs q 12 hours - no issues      Plan  -The patient is receiving day 6 of Rituxan today.  Then he will be ready to be discharged, he is scheduled for Neulasta at 2 PM tomorrow in the outpatient setting.  -Arrange blood checks and possible transfusion twice a week at [facility] outpatient infusion  -Prophylactic antibiotics with Levaquin 500 mg daily, acyclovir 400 mg twice daily, levofloxacin 100 mg daily  -Continue on allopurinol  -We will arrange follow-up with Dr. Barahona as an outpatient  -Discussed with patient and wife recent FISH studies, none double/triple hit he will most likely continue with R-CHOP chemotherapy as an outpatient  -Recommendations given to Dr. Smith    High complexity, complex decision making      Quality-Core Measures

## 2021-05-11 NOTE — DISCHARGE SUMMARY
Discharge Summary    CHIEF COMPLAINT ON ADMISSION  Chief Complaint   Patient presents with   • Weakness   • Back Pain       Reason for Admission  EMS     Admission Date  4/27/2021    CODE STATUS  Full Code    HPI & HOSPITAL COURSE  70-year-old male with history of hypertension, CKD.  Transferred from [facility] due to abnormal labs (calcium of 14) per primary care physician.  He complained of back pain, low-grade fevers, night sweats, and weight loss since November 2020.    On admission he was given IV fluids with zoledronic acid.  CT abdomen pelvis showed a large retroperitoneal mass surrounding the left kidney, and obstructing the left ureter.  Mass also was found to the aorta, superior mesenteric artery and vein.  He was noted to have hydronephrosis.  Urology were consulted.  Patient underwent cystoscopy with ureteral stent placement.  He underwent IR biopsy on 4/29/2021.  Biopsy showed aggressive B-cell lymphoma.  Final pathology is pending.  Oncology were consulted.  Chemotherapy was started on 5/6/2021. Patient received Rituxan on 5/11/2021.    Echocardiogram from 5/2/2021 showed LVEF of ~ 65%.      B-cell lymphoma  Patient will receive Neulasta today. Per oncology, continue fluconazole 100 mg daily for 3 weeks, acyclovir 400 mg twice daily for 3 weeks, Levaquin 500 mg daily for 3 weeks. Will need labs 2 x a week.     Essential hypertension  Continue home atenolol.  Follow with outpatient PCP in 1 to 2 days    Hypokalemia  Potassium has been replaced. Continue biweekly blood draws. Magnesium was normal    Therefore, he is discharged in guarded and stable condition to home with close outpatient follow-up.    Discharge Date  5/12/2021    FOLLOW UP ITEMS POST DISCHARGE  Oncology infusion on 5/12/2021    DISCHARGE DIAGNOSES  Active Problems:    Retroperitoneal mass POA: Unknown    Hypertension POA: Unknown    Anemia of chronic disease POA: Yes    Diffuse large B-cell lymphoma of lymph nodes of multiple regions  (HCC) POA: Unknown  Resolved Problems:    Hypercalcemia POA: Unknown    Acute kidney injury superimposed on CKD (HCC) POA: Unknown    Hydronephrosis POA: Unknown    Pleural effusion on left POA: Unknown    Hypokalemia POA: Unknown    Hypophosphatemia POA: Unknown      FOLLOW UP  Future Appointments   Date Time Provider Department Center   5/12/2021  2[facility]     No follow-up provider specified.    MEDICATIONS ON DISCHARGE     Medication List      START taking these medications      Instructions   acyclovir 400 MG tablet  Commonly known as: Zovirax   Take 1 tablet by mouth 2 times a day.  Dose: 400 mg     fluconazole 100 MG Tabs  Commonly known as: DIFLUCAN   Take 1 tablet by mouth every day.  Dose: 100 mg     levoFLOXacin 500 MG tablet  Commonly known as: LEVAQUIN   Take 1 tablet by mouth every day.  Dose: 500 mg        CONTINUE taking these medications      Instructions   allopurinol 100 MG Tabs  Commonly known as: ZYLOPRIM   Take 100 mg by mouth 2 times a day.  Dose: 100 mg     atenolol 50 MG Tabs  Commonly known as: TENORMIN   Take 50 mg by mouth every day.  Dose: 50 mg     cyclobenzaprine 10 mg Tabs  Commonly known as: Flexeril   Take 10 mg by mouth 3 times a day as needed for Muscle Spasms.  Dose: 10 mg     MULTIVITAMIN PO   Take 1 tablet by mouth every day.  Dose: 1 tablet     OMEPRAZOLE PO   Take 1 capsule by mouth every day. Pt was unable to recall dosage/time taken.  Dose: 1 capsule            Allergies  Allergies   Allergen Reactions   • Ibuprofen      Facial Swelling       DIET  Orders Placed This Encounter   Procedures   • Diet Order Diet: Regular     Standing Status:   Standing     Number of Occurrences:   1     Order Specific Question:   Diet:     Answer:   Regular [1]       ACTIVITY  As tolerated.      CONSULTATIONS  Oncology    PROCEDURES  IR biopsy    LABORATORY  Lab Results   Component Value Date    SODIUM 138 05/12/2021    POTASSIUM 3.1 (L) 05/12/2021    CHLORIDE  106 05/12/2021    CO2 25 05/12/2021    GLUCOSE 87 05/12/2021    BUN 23 (H) 05/12/2021    CREATININE 1.01 05/12/2021        Lab Results   Component Value Date    WBC 5.3 05/12/2021    HEMOGLOBIN 8.6 (L) 05/12/2021    HEMATOCRIT 27.0 (L) 05/12/2021    PLATELETCT 175 05/12/2021        Total time of the discharge process exceeds 34 minutes.

## 2021-05-11 NOTE — PROGRESS NOTES
Hospital Medicine Daily Progress Note    Date of Service  5/11/2021    Chief Complaint  70-year-old male with history of hypertension, CKD.  Transferred from [facility] due to abnormal labs (calcium of 14) per primary care physician.  He complained of back pain, low-grade fevers, night sweats, and weight loss since November 2020.    Hospital Course  On admission he was given IV fluids with zoledronic acid.  CT abdomen pelvis showed a large retroperitoneal mass surrounding the left kidney, and obstructing the left ureter.  Mass also was found to the aorta, superior mesenteric artery and vein.  He was noted to have hydronephrosis.  Urology were consulted.  Patient underwent cystoscopy with ureteral stent placement.  He underwent IR biopsy on 4/29/2021.  Biopsy showed aggressive B-cell lymphoma.  Final pathology is pending.  Oncology were consulted.  Chemotherapy was started on 5/6/2021.    Echocardiogram from 5/2/2021 showed LVEF of ~ 65%    Interval Problem Update  Patient received Rituxan today.  He was planned for discharge today, due to starting Neulasta tomorrow discharge was postponed to 5/12/2020 and per oncology recommendations as patient lives far away.    Consultants/Specialty  Oncology    Code Status  Full Code    Disposition  Home on 5/12/2020    Review of Systems  Review of Systems   Constitutional: Positive for malaise/fatigue.   HENT: Negative.    Eyes: Negative.    Respiratory: Negative.    Cardiovascular: Negative.    Gastrointestinal: Negative.    Genitourinary: Negative.    Musculoskeletal: Negative.    Skin: Negative.    Neurological: Negative.    Endo/Heme/Allergies: Negative.    Psychiatric/Behavioral: Negative.         Physical Exam  Temp:  [36.9 °C (98.5 °F)-37.2 °C (99 °F)] 37.1 °C (98.8 °F)  Pulse:  [60-75] 69  Resp:  [16-18] 18  BP: (143-168)/(73-81) 143/76  SpO2:  [96 %-100 %] 98 %    Physical Exam  Constitutional:       General: He is not in acute distress.     Appearance: He is not  ill-appearing.   HENT:      Head: Normocephalic.      Nose: Nose normal.      Mouth/Throat:      Mouth: Mucous membranes are moist.   Eyes:      Pupils: Pupils are equal, round, and reactive to light.   Cardiovascular:      Rate and Rhythm: Normal rate.   Pulmonary:      Effort: Pulmonary effort is normal.   Abdominal:      Palpations: Abdomen is soft.   Musculoskeletal:      Cervical back: Normal range of motion.      Right lower leg: No edema.      Left lower leg: No edema.   Skin:     General: Skin is warm.   Neurological:      General: No focal deficit present.   Psychiatric:         Mood and Affect: Mood normal.         Fluids  No intake or output data in the 24 hours ending 05/11/21 1151    Laboratory  Recent Labs     05/09/21  0107 05/10/21  0221 05/11/21  0122   WBC 8.4 5.9 4.6*   RBC 3.91* 3.75* 3.73*   HEMOGLOBIN 9.9* 9.5* 9.3*   HEMATOCRIT 30.6* 29.1* 28.6*   MCV 78.3* 77.6* 76.7*   MCH 25.3* 25.3* 24.9*   MCHC 32.4* 32.6* 32.5*   RDW 41.2 40.9 40.6   PLATELETCT 338 251 246   MPV 9.9 9.7 9.8     Recent Labs     05/09/21  0107 05/09/21  1214 05/10/21  1209   SODIUM 137 137 138   POTASSIUM 3.7 3.7 3.4*   CHLORIDE 105 105 106   CO2 23 22 24   GLUCOSE 132* 139* 127*   BUN 22 21 22   CREATININE 1.28 1.33 1.19   CALCIUM 8.4* 8.2* 7.8*                   Imaging  CT-CHEST (THORAX) W/O   Final Result         1.  Large confluent presumed herb mass in the retroperitoneum and upper abdomen, and contiguous with retrocrural mass in keeping with biopsy-proven lymphoma.   2.  Small to moderate left and small right pleural effusions.               CT-SOFT TISSUE NECK W/O   Final Result      1.  No significant neck mass or adenopathy is seen on this noncontrast study.   2.  Bilateral pleural effusions.      IR-PICC LINE PLACEMENT W/ GUIDANCE > AGE 5   Final Result                  Ultrasound-guided PICC placement performed by qualified nursing staff as    above.          EC-ECHOCARDIOGRAM COMPLETE W/O CONT   Final Result       CT-NEEDLE BX-ABD-RETROPERITONL   Final Result      1.  CT GUIDED RIGHT RETROPERITONEAL BIOPSY. MASSIVE RIGHT-SIDED PARA-AORTIC ADENOPATHY WAS TARGETED.      DX-CYSTO FLUORO > 1 HOUR   Final Result      Cysto fluoroscopy utilized for 36 seconds         INTERPRETING LOCATION: [address], [place], [address]      CT-ABDOMEN-PELVIS W/O   Final Result      1.  Very large retroperitoneal mass surrounding LEFT kidney and obstructing LEFT ureter.  Mass also surrounds the aorta, superior mesenteric artery and vein.   2.  Nonobstructing RIGHT kidney stone.   3.  Minimal ascites.   4.  Evaluation is limited due to lack of IV contrast.      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)  Assessment & Plan  Completed Rituxan today.  To start Neulasta on 5/12/2021.  Start fluconazole, acyclovir, levofloxacin. Monitor labs.  Continue outpatient oncology follow-up    Retroperitoneal mass  Assessment & Plan  Preliminary pathology results consistent with aggressive B-cell lymphoma.  Bone marrow biopsy results pending.    Anemia of chronic disease- (present on admission)  Assessment & Plan  Secondary to chemotherapy.  Continue biweekly blood test.  Transfuse for hemoglobin less than 7    Hypertension  Assessment & Plan  Continue home medications         VTE prophylaxis: Lovenox

## 2021-05-11 NOTE — PROGRESS NOTES
Chemotherapy Verification - PRIMARY RN      Height = 175.3cm  Weight = 85.3kg  BSA = 2.04m2       Medication: rituximab (ruxience)  Dose: 375mg/m2    Calculated Dose: 765mg    Ordered dose: 700mg                               (In mg/m2, AUC, mg/kg)         I confirm this process was performed independently with the BSA and all final chemotherapy dosing calculations congruent.  Any discrepancies of 10% or greater have been addressed with the chemotherapy pharmacist. The resolution of the discrepancy has been documented in the EPIC progress notes.

## 2021-05-11 NOTE — DISCHARGE PLANNING
Anticipated Discharge Disposition: Home with help    Action: LSW received update from bedside RN Veronica that pt can dc today. Team was notified prior to rounds that pt will require 3x week CBC labs done with possible transfusion in [facility]. @1015 LSW contacted [facility] to schedule, they had to schedule to leadership to get scheduled and call me back. LSW received voicemail after rounds from Pilar @ [facility] stating they were able to book the pt for the appointments, Dr. Minor stated that pt can go 2x a week instaed due to living remotely.     Betsy Gaxiola stated she contacted leadership in [facility] to have it scheduled as well and received updated appointments in [facility] as well.     Barriers to Discharge: None    Plan: LSW to assist as needed

## 2021-05-11 NOTE — DISCHARGE INSTRUCTIONS
Please follow-up with oncology infusion on 5/12/2021 for Neulasta    Please follow up with infusion center on 5/12/2021 to set up blood checks twice a week     Please follow with your primary care physician within 1 to 2 days    Start Levaquin 500 mg daily for 3 weeks, acyclovir 400 mg twice a day for 3 weeks, fluconazole 100 mg daily with 3 weeks.    Please return to the emergency room immediately if you have any fevers chills or other concerning symptoms.      Discharge Instructions    Discharged to home by car with relative. Discharged via wheelchair, hospital escort: Yes.  Special equipment needed: Not Applicable    Be sure to schedule a follow-up appointment with your primary care doctor or any specialists as instructed.     Discharge Plan:        I understand that a diet low in cholesterol, fat, and sodium is recommended for good health. Unless I have been given specific instructions below for another diet, I accept this instruction as my diet prescription.   Other diet: Regular    Special Instructions: None    · Is patient discharged on Warfarin / Coumadin?   No     Depression / Suicide Risk    As you are discharged from this [facility] facility, it is important to learn how to keep safe from harming yourself.    Recognize the warning signs:  · Abrupt changes in personality, positive or negative- including increase in energy   · Giving away possessions  · Change in eating patterns- significant weight changes-  positive or negative  · Change in sleeping patterns- unable to sleep or sleeping all the time   · Unwillingness or inability to communicate  · Depression  · Unusual sadness, discouragement and loneliness  · Talk of wanting to die  · Neglect of personal appearance   · Rebelliousness- reckless behavior  · Withdrawal from people/activities they love  · Confusion- inability to concentrate     If you or a loved one observes any of these behaviors or has concerns about self-harm, here's what you can  do:  · Talk about it- your feelings and reasons for harming yourself  · Remove any means that you might use to hurt yourself (examples: pills, rope, extension cords, firearm)  · Get professional help from the community (Mental Health, Substance Abuse, psychological counseling)  · Do not be alone:Call your Safe Contact- someone whom you trust who will be there for you.  · Call your local CRISIS HOTLINE 280-0564 or 759-458-7845  · Call your local Children's Mobile Crisis Response Team [place] (363) 676-5986 or www.[employer]  · Call the toll free National Suicide Prevention Hotlines   · National Suicide Prevention Lifeline 274-870-NUOI (8794)  · National Hope Line Network 800-SUICIDE (658-1525)

## 2021-05-11 NOTE — CARE PLAN
Problem: Knowledge Deficit  Goal: Knowledge of disease process/condition, treatment plan, diagnostic tests, and medications will improve  Outcome: PROGRESSING AS EXPECTED  Intervention: Assess knowledge level of disease process/condition, treatment plan, diagnostic tests, and medications       Problem: Discharge Barriers/Planning  Goal: Patient's continuum of care needs will be met  Outcome: PROGRESSING AS EXPECTED  Intervention: Assess potential discharge barriers on admission and throughout hospital stay  Note: DC 5/12

## 2021-05-12 ENCOUNTER — PHARMACY VISIT (OUTPATIENT)
Dept: PHARMACY | Facility: MEDICAL CENTER | Age: 71
End: 2021-05-12
Payer: COMMERCIAL

## 2021-05-12 ENCOUNTER — OUTPATIENT INFUSION SERVICES (OUTPATIENT)
Dept: ONCOLOGY | Facility: MEDICAL CENTER | Age: 71
End: 2021-05-12
Attending: INTERNAL MEDICINE
Payer: MEDICARE

## 2021-05-12 VITALS
RESPIRATION RATE: 16 BRPM | WEIGHT: 188.05 LBS | HEART RATE: 61 BPM | SYSTOLIC BLOOD PRESSURE: 140 MMHG | OXYGEN SATURATION: 100 % | BODY MASS INDEX: 27.85 KG/M2 | DIASTOLIC BLOOD PRESSURE: 71 MMHG | TEMPERATURE: 99.2 F | HEIGHT: 69 IN

## 2021-05-12 VITALS
WEIGHT: 186.07 LBS | HEIGHT: 68 IN | DIASTOLIC BLOOD PRESSURE: 62 MMHG | HEART RATE: 77 BPM | BODY MASS INDEX: 28.2 KG/M2 | RESPIRATION RATE: 18 BRPM | TEMPERATURE: 98 F | SYSTOLIC BLOOD PRESSURE: 116 MMHG | OXYGEN SATURATION: 98 %

## 2021-05-12 DIAGNOSIS — C83.38 DIFFUSE LARGE B-CELL LYMPHOMA OF LYMPH NODES OF MULTIPLE REGIONS (HCC): ICD-10-CM

## 2021-05-12 LAB
ALBUMIN SERPL BCP-MCNC: 2.9 G/DL (ref 3.2–4.9)
ALBUMIN/GLOB SERPL: 1.4 G/DL
ALP SERPL-CCNC: 55 U/L (ref 30–99)
ALT SERPL-CCNC: 11 U/L (ref 2–50)
ANION GAP SERPL CALC-SCNC: 7 MMOL/L (ref 7–16)
AST SERPL-CCNC: 9 U/L (ref 12–45)
BASOPHILS # BLD AUTO: 0 % (ref 0–1.8)
BASOPHILS # BLD: 0 K/UL (ref 0–0.12)
BILIRUB SERPL-MCNC: 0.4 MG/DL (ref 0.1–1.5)
BUN SERPL-MCNC: 23 MG/DL (ref 8–22)
CALCIUM SERPL-MCNC: 7.3 MG/DL (ref 8.5–10.5)
CHLORIDE SERPL-SCNC: 106 MMOL/L (ref 96–112)
CO2 SERPL-SCNC: 25 MMOL/L (ref 20–33)
CREAT SERPL-MCNC: 1.01 MG/DL (ref 0.5–1.4)
EOSINOPHIL # BLD AUTO: 0.03 K/UL (ref 0–0.51)
EOSINOPHIL NFR BLD: 0.6 % (ref 0–6.9)
ERYTHROCYTE [DISTWIDTH] IN BLOOD BY AUTOMATED COUNT: 41.1 FL (ref 35.9–50)
GLOBULIN SER CALC-MCNC: 2.1 G/DL (ref 1.9–3.5)
GLUCOSE SERPL-MCNC: 87 MG/DL (ref 65–99)
HCT VFR BLD AUTO: 27 % (ref 42–52)
HGB BLD-MCNC: 8.6 G/DL (ref 14–18)
IMM GRANULOCYTES # BLD AUTO: 0.04 K/UL (ref 0–0.11)
IMM GRANULOCYTES NFR BLD AUTO: 0.8 % (ref 0–0.9)
LYMPHOCYTES # BLD AUTO: 0.12 K/UL (ref 1–4.8)
LYMPHOCYTES NFR BLD: 2.3 % (ref 22–41)
MCH RBC QN AUTO: 24.7 PG (ref 27–33)
MCHC RBC AUTO-ENTMCNC: 31.9 G/DL (ref 33.7–35.3)
MCV RBC AUTO: 77.6 FL (ref 81.4–97.8)
MONOCYTES # BLD AUTO: 0 K/UL (ref 0–0.85)
MONOCYTES NFR BLD AUTO: 0 % (ref 0–13.4)
NEUTROPHILS # BLD AUTO: 5.07 K/UL (ref 1.82–7.42)
NEUTROPHILS NFR BLD: 96.3 % (ref 44–72)
NRBC # BLD AUTO: 0 K/UL
NRBC BLD-RTO: 0 /100 WBC
PHOSPHATE SERPL-MCNC: 1.7 MG/DL (ref 2.5–4.5)
PHOSPHATE SERPL-MCNC: 1.8 MG/DL (ref 2.5–4.5)
PLATELET # BLD AUTO: 175 K/UL (ref 164–446)
PMV BLD AUTO: 9.9 FL (ref 9–12.9)
POTASSIUM SERPL-SCNC: 3.1 MMOL/L (ref 3.6–5.5)
PROT SERPL-MCNC: 5 G/DL (ref 6–8.2)
RBC # BLD AUTO: 3.48 M/UL (ref 4.7–6.1)
SODIUM SERPL-SCNC: 138 MMOL/L (ref 135–145)
URATE SERPL-MCNC: 3.4 MG/DL (ref 2.5–8.3)
URATE SERPL-MCNC: 3.5 MG/DL (ref 2.5–8.3)
WBC # BLD AUTO: 5.3 K/UL (ref 4.8–10.8)

## 2021-05-12 PROCEDURE — A9270 NON-COVERED ITEM OR SERVICE: HCPCS | Performed by: INTERNAL MEDICINE

## 2021-05-12 PROCEDURE — 700102 HCHG RX REV CODE 250 W/ 637 OVERRIDE(OP): Performed by: INTERNAL MEDICINE

## 2021-05-12 PROCEDURE — 99239 HOSP IP/OBS DSCHRG MGMT >30: CPT | Performed by: INTERNAL MEDICINE

## 2021-05-12 PROCEDURE — 700111 HCHG RX REV CODE 636 W/ 250 OVERRIDE (IP): Performed by: STUDENT IN AN ORGANIZED HEALTH CARE EDUCATION/TRAINING PROGRAM

## 2021-05-12 PROCEDURE — 96372 THER/PROPH/DIAG INJ SC/IM: CPT

## 2021-05-12 PROCEDURE — 700111 HCHG RX REV CODE 636 W/ 250 OVERRIDE (IP): Mod: TB | Performed by: INTERNAL MEDICINE

## 2021-05-12 PROCEDURE — 80053 COMPREHEN METABOLIC PANEL: CPT

## 2021-05-12 PROCEDURE — A9270 NON-COVERED ITEM OR SERVICE: HCPCS | Performed by: STUDENT IN AN ORGANIZED HEALTH CARE EDUCATION/TRAINING PROGRAM

## 2021-05-12 PROCEDURE — RXMED WILLOW AMBULATORY MEDICATION CHARGE: Performed by: INTERNAL MEDICINE

## 2021-05-12 PROCEDURE — 85025 COMPLETE CBC W/AUTO DIFF WBC: CPT

## 2021-05-12 PROCEDURE — 84100 ASSAY OF PHOSPHORUS: CPT

## 2021-05-12 PROCEDURE — 700102 HCHG RX REV CODE 250 W/ 637 OVERRIDE(OP): Performed by: STUDENT IN AN ORGANIZED HEALTH CARE EDUCATION/TRAINING PROGRAM

## 2021-05-12 PROCEDURE — 84550 ASSAY OF BLOOD/URIC ACID: CPT

## 2021-05-12 RX ORDER — POTASSIUM CHLORIDE 7.45 MG/ML
10 INJECTION INTRAVENOUS
Status: DISCONTINUED | OUTPATIENT
Start: 2021-05-12 | End: 2021-05-12 | Stop reason: HOSPADM

## 2021-05-12 RX ORDER — POTASSIUM CHLORIDE 20 MEQ/1
40 TABLET, EXTENDED RELEASE ORAL ONCE
Status: COMPLETED | OUTPATIENT
Start: 2021-05-12 | End: 2021-05-12

## 2021-05-12 RX ORDER — ONDANSETRON 4 MG/1
4 TABLET, FILM COATED ORAL EVERY 8 HOURS PRN
Qty: 20 TABLET | Refills: 0 | Status: SHIPPED | OUTPATIENT
Start: 2021-05-12 | End: 2021-05-17

## 2021-05-12 RX ORDER — POTASSIUM CHLORIDE 20 MEQ/1
40 TABLET, EXTENDED RELEASE ORAL 2 TIMES DAILY
Qty: 2 TABLET | Refills: 0 | Status: SHIPPED | OUTPATIENT
Start: 2021-05-12 | End: 2021-05-13

## 2021-05-12 RX ORDER — LORAZEPAM 1 MG/1
1 TABLET ORAL EVERY 6 HOURS PRN
Qty: 24 TABLET | Refills: 0 | Status: SHIPPED | OUTPATIENT
Start: 2021-05-12 | End: 2021-05-17

## 2021-05-12 RX ORDER — OXYCODONE HYDROCHLORIDE 5 MG/1
5 TABLET ORAL EVERY 6 HOURS PRN
Qty: 24 TABLET | Refills: 0 | Status: SHIPPED | OUTPATIENT
Start: 2021-05-12 | End: 2021-05-17

## 2021-05-12 RX ADMIN — DOCUSATE SODIUM 50 MG AND SENNOSIDES 8.6 MG 2 TABLET: 8.6; 5 TABLET, FILM COATED ORAL at 07:44

## 2021-05-12 RX ADMIN — LORAZEPAM 1 MG: 1 TABLET ORAL at 14:39

## 2021-05-12 RX ADMIN — ATENOLOL 50 MG: 50 TABLET ORAL at 09:55

## 2021-05-12 RX ADMIN — ALLOPURINOL 300 MG: 300 TABLET ORAL at 07:42

## 2021-05-12 RX ADMIN — FAMOTIDINE 20 MG: 20 TABLET ORAL at 07:42

## 2021-05-12 RX ADMIN — ACYCLOVIR 400 MG: 800 TABLET ORAL at 07:42

## 2021-05-12 RX ADMIN — POTASSIUM CHLORIDE 40 MEQ: 1500 TABLET, EXTENDED RELEASE ORAL at 11:54

## 2021-05-12 RX ADMIN — ENOXAPARIN SODIUM 40 MG: 40 INJECTION SUBCUTANEOUS at 07:42

## 2021-05-12 RX ADMIN — AMLODIPINE BESYLATE 10 MG: 10 TABLET ORAL at 07:42

## 2021-05-12 RX ADMIN — PEGFILGRASTIM-CBQV 6 MG: 6 INJECTION, SOLUTION SUBCUTANEOUS at 15:01

## 2021-05-12 ASSESSMENT — FIBROSIS 4 INDEX: FIB4 SCORE: 1.085440840479949042

## 2021-05-12 NOTE — CARE PLAN
The patient is Stable - Low risk of patient condition declining or worsening         Summary of progress made towards problems/goals:  Patient discharging home

## 2021-05-12 NOTE — DISCHARGE PLANNING
Care Transition Team Discharge Planning    Anticipated Discharge Information  Discharge Disposition: Discharged to home/self care (01)  Discharge Address:  ([address] [place] [address])  Discharge Contact Phone Number:  ([phone])       Discharge Plan: Patient is discharging home to his prior living arrangement. He will follow up with Oncology today at 1400 for granix, [facility]. No needs from case management on discharge.     Lara Corona RN,

## 2021-05-12 NOTE — PROGRESS NOTES
Patient presents for Udenyca injection. Plan of care reviewed, questions answered as needed, patient verbalizes understanding. Udenyca given to the patient's LRQ, band aid to site. Patient returns Friday and released in no acute distress with his wife.

## 2021-05-12 NOTE — PROGRESS NOTES
Patient discharged home with wife. Has [facility] appointment today at 1445. Medications delivered to bedside by [facility]. Reviewed instructions, all questions answered. Wife & patient anxious about upcoming doctors appointments & lab draws. Provided with phone numbers. Confirmed lab draws will be twice weekly with [facility], will schedule upon arrival to appointment today.

## 2021-05-12 NOTE — DISCHARGE PLANNING
Meds-to-Beds: Discharge prescription orders listed below delivered to patient's bedside. RN Veronica notified. Verified potassium tablet dose and quantity with RN. Patient and family member counseled. Patient elected to have co-payment billed to patient account.      Khalif Kirkpatrick   Home Medication Instructions GILES:[id number]    Printed on:05/12/21 5167   Medication Information                      acyclovir (ZOVIRAX) 400 MG tablet  Take 1 tablet by mouth 2 times a day.             fluconazole (DIFLUCAN) 100 MG Tab  Take 1 tablet by mouth every day.             levoFLOXacin (LEVAQUIN) 500 MG tablet  Take 1 tablet by mouth every day.             LORazepam (ATIVAN) 1 MG Tab  Take 1 tablet by mouth every 6 hours as needed for Anxiety for up to 5 days.             ondansetron (ZOFRAN) 4 MG Tab tablet  Take 1 tablet by mouth every 8 hours as needed for Nausea/Vomiting for up to 5 days.             oxyCODONE immediate-release (ROXICODONE) 5 MG Tab  Take 1 tablet by mouth every 6 hours as needed for up to 5 days.             potassium chloride SA (KDUR) 20 MEQ Tab CR  Take 2 Tablets by mouth 2 times a day for 1 day.                 Ewelina Lobato, PharmD

## 2021-05-13 ENCOUNTER — APPOINTMENT (OUTPATIENT)
Dept: ONCOLOGY | Facility: MEDICAL CENTER | Age: 71
End: 2021-05-13
Attending: INTERNAL MEDICINE
Payer: MEDICARE

## 2021-05-14 ENCOUNTER — OUTPATIENT INFUSION SERVICES (OUTPATIENT)
Dept: ONCOLOGY | Facility: MEDICAL CENTER | Age: 71
End: 2021-05-14
Attending: INTERNAL MEDICINE
Payer: MEDICARE

## 2021-05-14 VITALS
TEMPERATURE: 98.5 F | HEIGHT: 67 IN | SYSTOLIC BLOOD PRESSURE: 109 MMHG | OXYGEN SATURATION: 98 % | RESPIRATION RATE: 18 BRPM | HEART RATE: 87 BPM | DIASTOLIC BLOOD PRESSURE: 64 MMHG | BODY MASS INDEX: 29.62 KG/M2 | WEIGHT: 188.71 LBS

## 2021-05-14 DIAGNOSIS — C83.38 DIFFUSE LARGE B-CELL LYMPHOMA OF LYMPH NODES OF MULTIPLE REGIONS (HCC): ICD-10-CM

## 2021-05-14 LAB
ANISOCYTOSIS BLD QL SMEAR: ABNORMAL
BASOPHILS # BLD AUTO: 0 % (ref 0–1.8)
BASOPHILS # BLD: 0 K/UL (ref 0–0.12)
EOSINOPHIL # BLD AUTO: 0.09 K/UL (ref 0–0.51)
EOSINOPHIL NFR BLD: 1.7 % (ref 0–6.9)
ERYTHROCYTE [DISTWIDTH] IN BLOOD BY AUTOMATED COUNT: 41.6 FL (ref 35.9–50)
HCT VFR BLD AUTO: 27.1 % (ref 42–52)
HGB BLD-MCNC: 8.8 G/DL (ref 14–18)
LYMPHOCYTES # BLD AUTO: 0.18 K/UL (ref 1–4.8)
LYMPHOCYTES NFR BLD: 3.4 % (ref 22–41)
MANUAL DIFF BLD: NORMAL
MCH RBC QN AUTO: 25.1 PG (ref 27–33)
MCHC RBC AUTO-ENTMCNC: 32.5 G/DL (ref 33.7–35.3)
MCV RBC AUTO: 77.4 FL (ref 81.4–97.8)
MICROCYTES BLD QL SMEAR: ABNORMAL
MONOCYTES # BLD AUTO: 0 K/UL (ref 0–0.85)
MONOCYTES NFR BLD AUTO: 0 % (ref 0–13.4)
MORPHOLOGY BLD-IMP: NORMAL
NEUTROPHILS # BLD AUTO: 5.03 K/UL (ref 1.82–7.42)
NEUTROPHILS NFR BLD: 94.9 % (ref 44–72)
NRBC # BLD AUTO: 0 K/UL
NRBC BLD-RTO: 0 /100 WBC
OVALOCYTES BLD QL SMEAR: NORMAL
PLATELET # BLD AUTO: 117 K/UL (ref 164–446)
PLATELET BLD QL SMEAR: NORMAL
PMV BLD AUTO: 10 FL (ref 9–12.9)
POIKILOCYTOSIS BLD QL SMEAR: NORMAL
RBC # BLD AUTO: 3.5 M/UL (ref 4.7–6.1)
RBC BLD AUTO: PRESENT
WBC # BLD AUTO: 5.3 K/UL (ref 4.8–10.8)

## 2021-05-14 PROCEDURE — 85007 BL SMEAR W/DIFF WBC COUNT: CPT

## 2021-05-14 PROCEDURE — 85027 COMPLETE CBC AUTOMATED: CPT

## 2021-05-14 PROCEDURE — 36592 COLLECT BLOOD FROM PICC: CPT

## 2021-05-14 RX ORDER — 0.9 % SODIUM CHLORIDE 0.9 %
10 VIAL (ML) INJECTION PRN
Status: CANCELLED | OUTPATIENT
Start: 2021-05-14

## 2021-05-14 RX ORDER — ACETAMINOPHEN 325 MG/1
650 TABLET ORAL PRN
Status: CANCELLED | OUTPATIENT
Start: 2021-05-14

## 2021-05-14 RX ORDER — 0.9 % SODIUM CHLORIDE 0.9 %
3 VIAL (ML) INJECTION PRN
Status: CANCELLED | OUTPATIENT
Start: 2021-05-14

## 2021-05-14 RX ORDER — ACETAMINOPHEN 325 MG/1
650 TABLET ORAL ONCE
Status: CANCELLED | OUTPATIENT
Start: 2021-05-14

## 2021-05-14 RX ORDER — HEPARIN SODIUM (PORCINE) LOCK FLUSH IV SOLN 100 UNIT/ML 100 UNIT/ML
500 SOLUTION INTRAVENOUS PRN
Status: CANCELLED | OUTPATIENT
Start: 2021-05-14

## 2021-05-14 RX ORDER — DIPHENHYDRAMINE HYDROCHLORIDE 50 MG/ML
25 INJECTION INTRAMUSCULAR; INTRAVENOUS PRN
Status: CANCELLED | OUTPATIENT
Start: 2021-05-14

## 2021-05-14 RX ORDER — SODIUM CHLORIDE 9 MG/ML
INJECTION, SOLUTION INTRAVENOUS CONTINUOUS
Status: CANCELLED | OUTPATIENT
Start: 2021-05-14

## 2021-05-14 RX ORDER — 0.9 % SODIUM CHLORIDE 0.9 %
VIAL (ML) INJECTION PRN
Status: CANCELLED | OUTPATIENT
Start: 2021-05-14

## 2021-05-14 ASSESSMENT — FIBROSIS 4 INDEX: FIB4 SCORE: 1.085440840479949042

## 2021-05-14 NOTE — PROGRESS NOTES
Patient to [facility] for labs and possible blood. PICC line in right arm, double lumen flushed with + blood return, labs drawn. HGB 8.8, Platelets 117K patient does not meet parameters for blood transfusion. PICC line flushed with + blood return, claves changed and wrapped in gauze and net sleeve. Patient has future appointment. Patient to home in care of self.

## 2021-05-17 ENCOUNTER — OUTPATIENT INFUSION SERVICES (OUTPATIENT)
Dept: ONCOLOGY | Facility: MEDICAL CENTER | Age: 71
End: 2021-05-17
Attending: INTERNAL MEDICINE
Payer: MEDICARE

## 2021-05-17 VITALS
DIASTOLIC BLOOD PRESSURE: 77 MMHG | BODY MASS INDEX: 27.34 KG/M2 | HEIGHT: 67 IN | RESPIRATION RATE: 18 BRPM | TEMPERATURE: 98 F | OXYGEN SATURATION: 98 % | SYSTOLIC BLOOD PRESSURE: 148 MMHG | HEART RATE: 70 BPM | WEIGHT: 174.16 LBS

## 2021-05-17 DIAGNOSIS — C83.38 DIFFUSE LARGE B-CELL LYMPHOMA OF LYMPH NODES OF MULTIPLE REGIONS (HCC): ICD-10-CM

## 2021-05-17 LAB
ANISOCYTOSIS BLD QL SMEAR: ABNORMAL
BASOPHILS # BLD AUTO: 7.1 % (ref 0–1.8)
BASOPHILS # BLD: 0.06 K/UL (ref 0–0.12)
EOSINOPHIL # BLD AUTO: 0.04 K/UL (ref 0–0.51)
EOSINOPHIL NFR BLD: 5.3 % (ref 0–6.9)
ERYTHROCYTE [DISTWIDTH] IN BLOOD BY AUTOMATED COUNT: 41.7 FL (ref 35.9–50)
HCT VFR BLD AUTO: 26.8 % (ref 42–52)
HGB BLD-MCNC: 8.6 G/DL (ref 14–18)
HYPOCHROMIA BLD QL SMEAR: ABNORMAL
LYMPHOCYTES # BLD AUTO: 0.34 K/UL (ref 1–4.8)
LYMPHOCYTES NFR BLD: 42.5 % (ref 22–41)
MANUAL DIFF BLD: NORMAL
MCH RBC QN AUTO: 24.8 PG (ref 27–33)
MCHC RBC AUTO-ENTMCNC: 32.1 G/DL (ref 33.7–35.3)
MCV RBC AUTO: 77.2 FL (ref 81.4–97.8)
MICROCYTES BLD QL SMEAR: ABNORMAL
MONOCYTES # BLD AUTO: 0.2 K/UL (ref 0–0.85)
MONOCYTES NFR BLD AUTO: 24.8 % (ref 0–13.4)
MORPHOLOGY BLD-IMP: NORMAL
NEUTROPHILS # BLD AUTO: 0.16 K/UL (ref 1.82–7.42)
NEUTROPHILS NFR BLD: 17.7 % (ref 44–72)
NEUTS BAND NFR BLD MANUAL: 1.7 % (ref 0–10)
NRBC # BLD AUTO: 0 K/UL
NRBC BLD-RTO: 0 /100 WBC
PLATELET # BLD AUTO: 76 K/UL (ref 164–446)
PLATELET BLD QL SMEAR: NORMAL
PMV BLD AUTO: 10.7 FL (ref 9–12.9)
RBC # BLD AUTO: 3.47 M/UL (ref 4.7–6.1)
RBC BLD AUTO: PRESENT
WBC # BLD AUTO: 0.8 K/UL (ref 4.8–10.8)
WBC OTHER NFR BLD MANUAL: 0.9 %

## 2021-05-17 PROCEDURE — 36592 COLLECT BLOOD FROM PICC: CPT

## 2021-05-17 PROCEDURE — 85027 COMPLETE CBC AUTOMATED: CPT

## 2021-05-17 PROCEDURE — 85007 BL SMEAR W/DIFF WBC COUNT: CPT

## 2021-05-17 RX ORDER — 0.9 % SODIUM CHLORIDE 0.9 %
10 VIAL (ML) INJECTION PRN
Status: CANCELLED | OUTPATIENT
Start: 2021-05-17

## 2021-05-17 RX ORDER — SODIUM CHLORIDE 9 MG/ML
INJECTION, SOLUTION INTRAVENOUS CONTINUOUS
Status: CANCELLED | OUTPATIENT
Start: 2021-05-17

## 2021-05-17 RX ORDER — ACETAMINOPHEN 325 MG/1
650 TABLET ORAL PRN
Status: CANCELLED | OUTPATIENT
Start: 2021-05-17

## 2021-05-17 RX ORDER — DIPHENHYDRAMINE HYDROCHLORIDE 50 MG/ML
25 INJECTION INTRAMUSCULAR; INTRAVENOUS PRN
Status: CANCELLED | OUTPATIENT
Start: 2021-05-17

## 2021-05-17 RX ORDER — 0.9 % SODIUM CHLORIDE 0.9 %
3 VIAL (ML) INJECTION PRN
Status: CANCELLED | OUTPATIENT
Start: 2021-05-17

## 2021-05-17 RX ORDER — HEPARIN SODIUM (PORCINE) LOCK FLUSH IV SOLN 100 UNIT/ML 100 UNIT/ML
500 SOLUTION INTRAVENOUS PRN
Status: CANCELLED | OUTPATIENT
Start: 2021-05-17

## 2021-05-17 RX ORDER — 0.9 % SODIUM CHLORIDE 0.9 %
VIAL (ML) INJECTION PRN
Status: CANCELLED | OUTPATIENT
Start: 2021-05-17

## 2021-05-17 RX ORDER — ACETAMINOPHEN 325 MG/1
650 TABLET ORAL ONCE
Status: CANCELLED | OUTPATIENT
Start: 2021-05-17

## 2021-05-17 ASSESSMENT — FIBROSIS 4 INDEX: FIB4 SCORE: 1.62

## 2021-05-17 NOTE — PROGRESS NOTES
Patient here for CBC/possible blood products. Right double lumen PICC in place; brisk blood return noted x 2 lumens. Claves changed. Las drawn as ordered. Dressing changed using sterile technique. Nurse Navigator at chair side. Labs reviewed. Hgb = 8.6 and Platelets 76K. Patient does not meet parameters for blood transfusion today. Updated patient on lab results. Reinforced education regarding neutropenic precautions. Next appointment scheduled. Discharged to self care; no apparent distress noted.

## 2021-05-18 ENCOUNTER — PATIENT OUTREACH (OUTPATIENT)
Dept: OTHER | Facility: MEDICAL CENTER | Age: 71
End: 2021-05-18

## 2021-05-18 NOTE — PROGRESS NOTES
Oncology nurse navigator met with patient at the infusion center to introduce self and my role and services.  Oncology nurse navigator received a referral for the patient about some possible transportation issues.  Patient reports that he is commuting from almost [employer] and that his wife is driving him.  He reports that he and his wife are retired and it is about $40 round trip cost for them.  They currently have twice a week appointments in the infusion center for CBC  's and possible transfusions.  He reports that due to his hearing loss he depends on his wife a lot to help him manage his care.  He reports that he does have a follow up with [facility] this Thursday.  Patient denies any other concerns or barriers at this time.  Oncology nurse navigator gave patient introduction letter and a business card.

## 2021-05-19 ENCOUNTER — APPOINTMENT (OUTPATIENT)
Dept: ONCOLOGY | Facility: MEDICAL CENTER | Age: 71
End: 2021-05-19
Attending: INTERNAL MEDICINE
Payer: MEDICARE

## 2021-05-19 ENCOUNTER — PATIENT OUTREACH (OUTPATIENT)
Dept: OTHER | Facility: MEDICAL CENTER | Age: 71
End: 2021-05-19

## 2021-05-19 NOTE — PROGRESS NOTES
Oncology nurse navigator received a call from the patients wife Vale asking direction on his next chemotherapy and when.  Oncology nurse navigator introduced self my role and support services at [facility].  Vale reports that she is confused as to what his next chemotherapy session might be and when.  Vale reports that she has been told that it will be another 5 days in the hospital but then again she was told that it night be a one day session outpatient.  Oncology nurse navigator referred to Dr. Ignacio's discharge note from his last inpatient stay stating that he would be following up at [facility] with Dr. 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  Roland verbalized understanding and then asked about the shot the next day that he gets.  Oncology nurse navigator confirmed that this probably would still have to happen but deferred that back to Dr. Barahona their medical oncologists.  Oncology nurse navigator encouraged her to write these questions down and follow up with them when they meet with Dr. Barahona tomorrow at 430.  Vale asked about more possible side effects from the chemotherapy.  Oncology nurse navigator explained that the fatigue and low blood counts will be the main side effects and that the nausea is usually well controlled with the medication.  Vale asked whom would be prescribing the cancer related medication and Oncology nurse navigator confirmed that Dr. Barahona would be the provider in that role.  Oncology nurse navigator explained that all of his previous medications prescribed by his PCP would continue to go thorough him.  Vale verbalized understanding.  Vale reports that she and her  are both retired and both have a social security income.  The patient reports that he has a [facility] income from [facility] as well.  Vale reports that she also has a 401k.  She reports that the money has been  a strain and that they are getting thorough it.  Oncology nurse navigator also offered lodging assistance but she stated that they have animals that they need to care for at the house.

## 2021-05-20 ENCOUNTER — APPOINTMENT (OUTPATIENT)
Dept: ONCOLOGY | Facility: MEDICAL CENTER | Age: 71
End: 2021-05-20
Attending: INTERNAL MEDICINE
Payer: MEDICARE

## 2021-05-24 ENCOUNTER — OUTPATIENT INFUSION SERVICES (OUTPATIENT)
Dept: ONCOLOGY | Facility: MEDICAL CENTER | Age: 71
End: 2021-05-24
Attending: INTERNAL MEDICINE
Payer: MEDICARE

## 2021-05-24 VITALS
TEMPERATURE: 98 F | SYSTOLIC BLOOD PRESSURE: 147 MMHG | HEART RATE: 72 BPM | BODY MASS INDEX: 26.99 KG/M2 | OXYGEN SATURATION: 98 % | WEIGHT: 171.96 LBS | HEIGHT: 67 IN | RESPIRATION RATE: 18 BRPM | DIASTOLIC BLOOD PRESSURE: 65 MMHG

## 2021-05-24 DIAGNOSIS — C83.38 DIFFUSE LARGE B-CELL LYMPHOMA OF LYMPH NODES OF MULTIPLE REGIONS (HCC): ICD-10-CM

## 2021-05-24 LAB
ANISOCYTOSIS BLD QL SMEAR: ABNORMAL
BASOPHILS # BLD AUTO: 0 % (ref 0–1.8)
BASOPHILS # BLD: 0 K/UL (ref 0–0.12)
EOSINOPHIL # BLD AUTO: 0 K/UL (ref 0–0.51)
EOSINOPHIL NFR BLD: 0 % (ref 0–6.9)
ERYTHROCYTE [DISTWIDTH] IN BLOOD BY AUTOMATED COUNT: 43.9 FL (ref 35.9–50)
HCT VFR BLD AUTO: 27.6 % (ref 42–52)
HGB BLD-MCNC: 8.7 G/DL (ref 14–18)
HYPOCHROMIA BLD QL SMEAR: ABNORMAL
LYMPHOCYTES # BLD AUTO: 0.96 K/UL (ref 1–4.8)
LYMPHOCYTES NFR BLD: 5.2 % (ref 22–41)
MACROCYTES BLD QL SMEAR: ABNORMAL
MANUAL DIFF BLD: NORMAL
MCH RBC QN AUTO: 25.3 PG (ref 27–33)
MCHC RBC AUTO-ENTMCNC: 31.5 G/DL (ref 33.7–35.3)
MCV RBC AUTO: 80.2 FL (ref 81.4–97.8)
MICROCYTES BLD QL SMEAR: ABNORMAL
MONOCYTES # BLD AUTO: 0.31 K/UL (ref 0–0.85)
MONOCYTES NFR BLD AUTO: 1.7 % (ref 0–13.4)
MORPHOLOGY BLD-IMP: NORMAL
MYELOCYTES NFR BLD MANUAL: 0.9 %
NEUTROPHILS # BLD AUTO: 16.96 K/UL (ref 1.82–7.42)
NEUTROPHILS NFR BLD: 92.2 % (ref 44–72)
NRBC # BLD AUTO: 0 K/UL
NRBC BLD-RTO: 0 /100 WBC
OVALOCYTES BLD QL SMEAR: NORMAL
PLATELET # BLD AUTO: 209 K/UL (ref 164–446)
PLATELET BLD QL SMEAR: NORMAL
PMV BLD AUTO: 9.5 FL (ref 9–12.9)
POIKILOCYTOSIS BLD QL SMEAR: NORMAL
RBC # BLD AUTO: 3.44 M/UL (ref 4.7–6.1)
RBC BLD AUTO: PRESENT
WBC # BLD AUTO: 18.4 K/UL (ref 4.8–10.8)

## 2021-05-24 PROCEDURE — 36592 COLLECT BLOOD FROM PICC: CPT

## 2021-05-24 PROCEDURE — 85007 BL SMEAR W/DIFF WBC COUNT: CPT

## 2021-05-24 PROCEDURE — 85027 COMPLETE CBC AUTOMATED: CPT

## 2021-05-24 RX ORDER — 0.9 % SODIUM CHLORIDE 0.9 %
10 VIAL (ML) INJECTION PRN
Status: CANCELLED | OUTPATIENT
Start: 2021-05-24

## 2021-05-24 RX ORDER — HEPARIN SODIUM (PORCINE) LOCK FLUSH IV SOLN 100 UNIT/ML 100 UNIT/ML
500 SOLUTION INTRAVENOUS PRN
Status: CANCELLED | OUTPATIENT
Start: 2021-05-24

## 2021-05-24 RX ORDER — 0.9 % SODIUM CHLORIDE 0.9 %
VIAL (ML) INJECTION PRN
Status: CANCELLED | OUTPATIENT
Start: 2021-05-24

## 2021-05-24 RX ORDER — ACETAMINOPHEN 325 MG/1
650 TABLET ORAL PRN
Status: CANCELLED | OUTPATIENT
Start: 2021-05-24

## 2021-05-24 RX ORDER — DIPHENHYDRAMINE HYDROCHLORIDE 50 MG/ML
25 INJECTION INTRAMUSCULAR; INTRAVENOUS PRN
Status: CANCELLED | OUTPATIENT
Start: 2021-05-24

## 2021-05-24 RX ORDER — SODIUM CHLORIDE 9 MG/ML
INJECTION, SOLUTION INTRAVENOUS CONTINUOUS
Status: CANCELLED | OUTPATIENT
Start: 2021-05-24

## 2021-05-24 RX ORDER — ACETAMINOPHEN 325 MG/1
650 TABLET ORAL ONCE
Status: CANCELLED | OUTPATIENT
Start: 2021-05-24

## 2021-05-24 RX ORDER — 0.9 % SODIUM CHLORIDE 0.9 %
3 VIAL (ML) INJECTION PRN
Status: CANCELLED | OUTPATIENT
Start: 2021-05-24

## 2021-05-24 ASSESSMENT — FIBROSIS 4 INDEX: FIB4 SCORE: 2.5

## 2021-05-24 NOTE — PROGRESS NOTES
Khalif presents to [facility] for PICC  and possible blood transfusion. RUE dual lumen PICC line present, claves changed and brisk blood return observed in each lumen. PICC line dressing then changed in sterile manner and per policy. Brisk blood return observed in each lumen after dressing changed. Hemoglobin 8.7, and platelets 209,000. Khalif does not meet parameters for blood transfusion. Wrapped PICC line in gauze and protective sleeve, Khalif has next appointment scheduled. Khalif discharged home to self care.

## 2021-05-27 ENCOUNTER — APPOINTMENT (OUTPATIENT)
Dept: ONCOLOGY | Facility: MEDICAL CENTER | Age: 71
End: 2021-05-27
Attending: INTERNAL MEDICINE
Payer: MEDICARE

## 2021-05-31 ENCOUNTER — OUTPATIENT INFUSION SERVICES (OUTPATIENT)
Dept: ONCOLOGY | Facility: MEDICAL CENTER | Age: 71
End: 2021-05-31
Attending: INTERNAL MEDICINE
Payer: MEDICARE

## 2021-05-31 VITALS
OXYGEN SATURATION: 100 % | SYSTOLIC BLOOD PRESSURE: 115 MMHG | TEMPERATURE: 97.7 F | HEIGHT: 67 IN | RESPIRATION RATE: 18 BRPM | WEIGHT: 171.96 LBS | HEART RATE: 82 BPM | BODY MASS INDEX: 26.99 KG/M2 | DIASTOLIC BLOOD PRESSURE: 61 MMHG

## 2021-05-31 DIAGNOSIS — C83.38 DIFFUSE LARGE B-CELL LYMPHOMA OF LYMPH NODES OF MULTIPLE REGIONS (HCC): ICD-10-CM

## 2021-05-31 LAB
ANISOCYTOSIS BLD QL SMEAR: ABNORMAL
BASOPHILS # BLD AUTO: 0 % (ref 0–1.8)
BASOPHILS # BLD: 0 K/UL (ref 0–0.12)
EOSINOPHIL # BLD AUTO: 0 K/UL (ref 0–0.51)
EOSINOPHIL NFR BLD: 0 % (ref 0–6.9)
ERYTHROCYTE [DISTWIDTH] IN BLOOD BY AUTOMATED COUNT: 44.7 FL (ref 35.9–50)
GIANT PLATELETS BLD QL SMEAR: NORMAL
HCT VFR BLD AUTO: 26.4 % (ref 42–52)
HGB BLD-MCNC: 8.6 G/DL (ref 14–18)
HYPOCHROMIA BLD QL SMEAR: ABNORMAL
LYMPHOCYTES # BLD AUTO: 0 K/UL (ref 1–4.8)
LYMPHOCYTES NFR BLD: 0 % (ref 22–41)
MACROCYTES BLD QL SMEAR: ABNORMAL
MANUAL DIFF BLD: NORMAL
MCH RBC QN AUTO: 26.1 PG (ref 27–33)
MCHC RBC AUTO-ENTMCNC: 32.6 G/DL (ref 33.7–35.3)
MCV RBC AUTO: 80.2 FL (ref 81.4–97.8)
MICROCYTES BLD QL SMEAR: ABNORMAL
MONOCYTES # BLD AUTO: 0.56 K/UL (ref 0–0.85)
MONOCYTES NFR BLD AUTO: 0.9 % (ref 0–13.4)
MORPHOLOGY BLD-IMP: NORMAL
NEUTROPHILS # BLD AUTO: 61.54 K/UL (ref 1.82–7.42)
NEUTROPHILS NFR BLD: 96.5 % (ref 44–72)
NEUTS BAND NFR BLD MANUAL: 2.6 % (ref 0–10)
NRBC # BLD AUTO: 0 K/UL
NRBC BLD-RTO: 0 /100 WBC
PLATELET # BLD AUTO: 328 K/UL (ref 164–446)
PLATELET BLD QL SMEAR: NORMAL
PMV BLD AUTO: 9.4 FL (ref 9–12.9)
POLYCHROMASIA BLD QL SMEAR: NORMAL
RBC # BLD AUTO: 3.29 M/UL (ref 4.7–6.1)
RBC BLD AUTO: PRESENT
WBC # BLD AUTO: 62.1 K/UL (ref 4.8–10.8)

## 2021-05-31 PROCEDURE — 85027 COMPLETE CBC AUTOMATED: CPT

## 2021-05-31 PROCEDURE — 85007 BL SMEAR W/DIFF WBC COUNT: CPT

## 2021-05-31 PROCEDURE — 36592 COLLECT BLOOD FROM PICC: CPT

## 2021-05-31 RX ORDER — ACETAMINOPHEN 325 MG/1
650 TABLET ORAL ONCE
Status: CANCELLED | OUTPATIENT
Start: 2021-05-31

## 2021-05-31 RX ORDER — 0.9 % SODIUM CHLORIDE 0.9 %
10 VIAL (ML) INJECTION PRN
Status: CANCELLED | OUTPATIENT
Start: 2021-05-31

## 2021-05-31 RX ORDER — SODIUM CHLORIDE 9 MG/ML
INJECTION, SOLUTION INTRAVENOUS CONTINUOUS
Status: CANCELLED | OUTPATIENT
Start: 2021-05-31

## 2021-05-31 RX ORDER — ACETAMINOPHEN 325 MG/1
650 TABLET ORAL PRN
Status: CANCELLED | OUTPATIENT
Start: 2021-05-31

## 2021-05-31 RX ORDER — DIPHENHYDRAMINE HYDROCHLORIDE 50 MG/ML
25 INJECTION INTRAMUSCULAR; INTRAVENOUS PRN
Status: CANCELLED | OUTPATIENT
Start: 2021-05-31

## 2021-05-31 RX ORDER — 0.9 % SODIUM CHLORIDE 0.9 %
3 VIAL (ML) INJECTION PRN
Status: CANCELLED | OUTPATIENT
Start: 2021-05-31

## 2021-05-31 RX ORDER — 0.9 % SODIUM CHLORIDE 0.9 %
VIAL (ML) INJECTION PRN
Status: CANCELLED | OUTPATIENT
Start: 2021-05-31

## 2021-05-31 RX ORDER — HEPARIN SODIUM (PORCINE) LOCK FLUSH IV SOLN 100 UNIT/ML 100 UNIT/ML
500 SOLUTION INTRAVENOUS PRN
Status: CANCELLED | OUTPATIENT
Start: 2021-05-31

## 2021-05-31 ASSESSMENT — FIBROSIS 4 INDEX: FIB4 SCORE: 0.91

## 2021-05-31 NOTE — PROGRESS NOTES
Returns for lab and possible blood as well as picc dssg change.  Lab drawn and sent via PICC.  Saline flush post with cap change.  Counts do not meet parameters to infuse blood.  Message to MD re elevated wbc, pt had neulasta in office post chemo.  Dssg change per protocol.  DC to care of ride.

## 2021-06-03 ENCOUNTER — APPOINTMENT (OUTPATIENT)
Dept: ONCOLOGY | Facility: MEDICAL CENTER | Age: 71
End: 2021-06-03
Attending: INTERNAL MEDICINE
Payer: MEDICARE

## 2021-06-07 ENCOUNTER — OUTPATIENT INFUSION SERVICES (OUTPATIENT)
Dept: ONCOLOGY | Facility: MEDICAL CENTER | Age: 71
End: 2021-06-07
Attending: INTERNAL MEDICINE
Payer: MEDICARE

## 2021-06-07 VITALS
BODY MASS INDEX: 26.64 KG/M2 | OXYGEN SATURATION: 98 % | RESPIRATION RATE: 18 BRPM | HEIGHT: 67 IN | DIASTOLIC BLOOD PRESSURE: 61 MMHG | WEIGHT: 169.75 LBS | TEMPERATURE: 97 F | HEART RATE: 80 BPM | SYSTOLIC BLOOD PRESSURE: 95 MMHG

## 2021-06-07 DIAGNOSIS — C83.38 DIFFUSE LARGE B-CELL LYMPHOMA OF LYMPH NODES OF MULTIPLE REGIONS (HCC): ICD-10-CM

## 2021-06-07 LAB
BASOPHILS # BLD AUTO: 1.2 % (ref 0–1.8)
BASOPHILS # BLD: 0.05 K/UL (ref 0–0.12)
EOSINOPHIL # BLD AUTO: 0.02 K/UL (ref 0–0.51)
EOSINOPHIL NFR BLD: 0.5 % (ref 0–6.9)
ERYTHROCYTE [DISTWIDTH] IN BLOOD BY AUTOMATED COUNT: 61.1 FL (ref 35.9–50)
HCT VFR BLD AUTO: 29.3 % (ref 42–52)
HGB BLD-MCNC: 9.3 G/DL (ref 14–18)
IMM GRANULOCYTES # BLD AUTO: 0.17 K/UL (ref 0–0.11)
IMM GRANULOCYTES NFR BLD AUTO: 4.2 % (ref 0–0.9)
LYMPHOCYTES # BLD AUTO: 0.41 K/UL (ref 1–4.8)
LYMPHOCYTES NFR BLD: 10.2 % (ref 22–41)
MCH RBC QN AUTO: 26.3 PG (ref 27–33)
MCHC RBC AUTO-ENTMCNC: 31.7 G/DL (ref 33.7–35.3)
MCV RBC AUTO: 83 FL (ref 81.4–97.8)
MONOCYTES # BLD AUTO: 0.43 K/UL (ref 0–0.85)
MONOCYTES NFR BLD AUTO: 10.7 % (ref 0–13.4)
NEUTROPHILS # BLD AUTO: 2.93 K/UL (ref 1.82–7.42)
NEUTROPHILS NFR BLD: 73.2 % (ref 44–72)
NRBC # BLD AUTO: 0 K/UL
NRBC BLD-RTO: 0 /100 WBC
PLATELET # BLD AUTO: 180 K/UL (ref 164–446)
PMV BLD AUTO: 9.9 FL (ref 9–12.9)
RBC # BLD AUTO: 3.53 M/UL (ref 4.7–6.1)
WBC # BLD AUTO: 4 K/UL (ref 4.8–10.8)

## 2021-06-07 PROCEDURE — 85025 COMPLETE CBC W/AUTO DIFF WBC: CPT

## 2021-06-07 PROCEDURE — 36592 COLLECT BLOOD FROM PICC: CPT

## 2021-06-07 RX ORDER — DIPHENHYDRAMINE HYDROCHLORIDE 50 MG/ML
25 INJECTION INTRAMUSCULAR; INTRAVENOUS PRN
Status: CANCELLED | OUTPATIENT
Start: 2021-06-07

## 2021-06-07 RX ORDER — HEPARIN SODIUM (PORCINE) LOCK FLUSH IV SOLN 100 UNIT/ML 100 UNIT/ML
500 SOLUTION INTRAVENOUS PRN
Status: CANCELLED | OUTPATIENT
Start: 2021-06-07

## 2021-06-07 RX ORDER — 0.9 % SODIUM CHLORIDE 0.9 %
10 VIAL (ML) INJECTION PRN
Status: CANCELLED | OUTPATIENT
Start: 2021-06-07

## 2021-06-07 RX ORDER — 0.9 % SODIUM CHLORIDE 0.9 %
VIAL (ML) INJECTION PRN
Status: CANCELLED | OUTPATIENT
Start: 2021-06-07

## 2021-06-07 RX ORDER — ACETAMINOPHEN 325 MG/1
650 TABLET ORAL PRN
Status: CANCELLED | OUTPATIENT
Start: 2021-06-07

## 2021-06-07 RX ORDER — 0.9 % SODIUM CHLORIDE 0.9 %
3 VIAL (ML) INJECTION PRN
Status: CANCELLED | OUTPATIENT
Start: 2021-06-07

## 2021-06-07 RX ORDER — ACETAMINOPHEN 325 MG/1
650 TABLET ORAL ONCE
Status: CANCELLED | OUTPATIENT
Start: 2021-06-07

## 2021-06-07 RX ORDER — SODIUM CHLORIDE 9 MG/ML
INJECTION, SOLUTION INTRAVENOUS CONTINUOUS
Status: CANCELLED | OUTPATIENT
Start: 2021-06-07

## 2021-06-07 ASSESSMENT — FIBROSIS 4 INDEX: FIB4 SCORE: 0.58

## 2021-06-07 NOTE — PROGRESS NOTES
Patient here for CBC/possible blood products. Right double lumen PICC in place; brisk blood return noted x 2 lumens. Claves changed. Labs drawn as ordered. Dressing changed using sterile technique. Labs reviewed. Hgb = 9.3 and Platelets 180K. Patient does not meet parameters for blood transfusion today. Updated patient on lab results. Next appointment scheduled. Discharged to self care; no apparent distress noted.

## 2021-06-10 ENCOUNTER — APPOINTMENT (OUTPATIENT)
Dept: ONCOLOGY | Facility: MEDICAL CENTER | Age: 71
End: 2021-06-10
Attending: INTERNAL MEDICINE
Payer: MEDICARE

## 2021-06-14 ENCOUNTER — APPOINTMENT (OUTPATIENT)
Dept: ONCOLOGY | Facility: MEDICAL CENTER | Age: 71
End: 2021-06-14
Attending: INTERNAL MEDICINE
Payer: MEDICARE

## 2021-06-14 ENCOUNTER — NURSE TRIAGE (OUTPATIENT)
Dept: HEALTH INFORMATION MANAGEMENT | Facility: OTHER | Age: 71
End: 2021-06-14

## 2021-06-14 NOTE — TELEPHONE ENCOUNTER
"Pt wife calling w/ questions on pts POC and when he qualifies for a transfusion since they live hours away. Discussed w/ transfusion center pts personalized POC and numbers. All questions answered.     Reason for Disposition  • Information only question and nurse able to answer    Additional Information  • Negative: Nursing judgment  • Negative: Nursing judgment  • Negative: Nursing judgment  • Negative: Nursing judgment    Answer Assessment - Initial Assessment Questions  1. REASON FOR CALL or QUESTION: \"What is your reason for calling today?\" or \"How can I best help you?\" or \"What question do you have that I can help answer?\"      When will my  receive a blood transfusion    Protocols used: INFORMATION ONLY CALL - NO TRIAGE-A-OH      "

## 2021-07-01 ENCOUNTER — HOSPITAL ENCOUNTER (OUTPATIENT)
Dept: HOSPITAL 8 - PETCFH | Age: 71
Discharge: HOME | End: 2021-07-01
Attending: INTERNAL MEDICINE
Payer: MEDICARE

## 2021-07-01 DIAGNOSIS — I25.10: ICD-10-CM

## 2021-07-01 DIAGNOSIS — K57.30: ICD-10-CM

## 2021-07-01 DIAGNOSIS — N20.0: ICD-10-CM

## 2021-07-01 DIAGNOSIS — M47.816: ICD-10-CM

## 2021-07-01 DIAGNOSIS — C83.38: Primary | ICD-10-CM

## 2021-07-01 PROCEDURE — A9552 F18 FDG: HCPCS

## 2021-07-01 PROCEDURE — 78815 PET IMAGE W/CT SKULL-THIGH: CPT

## 2021-08-11 ENCOUNTER — APPOINTMENT (OUTPATIENT)
Dept: RADIOLOGY | Facility: MEDICAL CENTER | Age: 71
End: 2021-08-11
Attending: INTERNAL MEDICINE
Payer: MEDICARE

## 2021-08-25 ENCOUNTER — HOSPITAL ENCOUNTER (OUTPATIENT)
Facility: MEDICAL CENTER | Age: 71
End: 2021-08-25
Attending: INTERNAL MEDICINE
Payer: MEDICARE

## 2021-08-25 ENCOUNTER — HOSPITAL ENCOUNTER (OUTPATIENT)
Dept: LAB | Facility: MEDICAL CENTER | Age: 71
End: 2021-08-25
Attending: INTERNAL MEDICINE
Payer: MEDICARE

## 2021-08-25 ENCOUNTER — HOSPITAL ENCOUNTER (OUTPATIENT)
Dept: RADIOLOGY | Facility: MEDICAL CENTER | Age: 71
End: 2021-08-25
Attending: INTERNAL MEDICINE
Payer: MEDICARE

## 2021-08-25 DIAGNOSIS — C83.38 RETICULOSARCOMA OF LYMPH NODES OF MULTIPLE SITES (HCC): ICD-10-CM

## 2021-08-25 LAB
ALBUMIN SERPL BCP-MCNC: 4 G/DL (ref 3.2–4.9)
ALBUMIN/GLOB SERPL: 1.8 G/DL
ALP SERPL-CCNC: 117 U/L (ref 30–99)
ALT SERPL-CCNC: 13 U/L (ref 2–50)
ANION GAP SERPL CALC-SCNC: 9 MMOL/L (ref 7–16)
APTT PPP: 25.7 SEC (ref 24.7–36)
AST SERPL-CCNC: 18 U/L (ref 12–45)
BASOPHILS # BLD AUTO: 0.7 % (ref 0–1.8)
BASOPHILS # BLD: 0.04 K/UL (ref 0–0.12)
BILIRUB SERPL-MCNC: 0.3 MG/DL (ref 0.1–1.5)
BUN SERPL-MCNC: 16 MG/DL (ref 8–22)
CALCIUM SERPL-MCNC: 9.1 MG/DL (ref 8.5–10.5)
CHLORIDE SERPL-SCNC: 104 MMOL/L (ref 96–112)
CO2 SERPL-SCNC: 26 MMOL/L (ref 20–33)
CREAT SERPL-MCNC: 1.13 MG/DL (ref 0.5–1.4)
CYTOLOGY REG CYTOL: NORMAL
EOSINOPHIL # BLD AUTO: 0.21 K/UL (ref 0–0.51)
EOSINOPHIL NFR BLD: 3.6 % (ref 0–6.9)
ERYTHROCYTE [DISTWIDTH] IN BLOOD BY AUTOMATED COUNT: 53.4 FL (ref 35.9–50)
FERRITIN SERPL-MCNC: 1104 NG/ML (ref 22–322)
GLOBULIN SER CALC-MCNC: 2.2 G/DL (ref 1.9–3.5)
GLUCOSE SERPL-MCNC: 122 MG/DL (ref 65–99)
HCT VFR BLD AUTO: 32.3 % (ref 42–52)
HGB BLD-MCNC: 10.6 G/DL (ref 14–18)
IMM GRANULOCYTES # BLD AUTO: 0.12 K/UL (ref 0–0.11)
IMM GRANULOCYTES NFR BLD AUTO: 2 % (ref 0–0.9)
INR PPP: 1 (ref 0.87–1.13)
IRON SATN MFR SERPL: 15 % (ref 15–55)
IRON SERPL-MCNC: 42 UG/DL (ref 50–180)
LDH SERPL L TO P-CCNC: 213 U/L (ref 107–266)
LYMPHOCYTES # BLD AUTO: 0.34 K/UL (ref 1–4.8)
LYMPHOCYTES NFR BLD: 5.8 % (ref 22–41)
MCH RBC QN AUTO: 32.1 PG (ref 27–33)
MCHC RBC AUTO-ENTMCNC: 32.8 G/DL (ref 33.7–35.3)
MCV RBC AUTO: 97.9 FL (ref 81.4–97.8)
MONOCYTES # BLD AUTO: 0.72 K/UL (ref 0–0.85)
MONOCYTES NFR BLD AUTO: 12.2 % (ref 0–13.4)
NEUTROPHILS # BLD AUTO: 4.45 K/UL (ref 1.82–7.42)
NEUTROPHILS NFR BLD: 75.7 % (ref 44–72)
NRBC # BLD AUTO: 0 K/UL
NRBC BLD-RTO: 0 /100 WBC
PLATELET # BLD AUTO: 199 K/UL (ref 164–446)
PMV BLD AUTO: 10.3 FL (ref 9–12.9)
POTASSIUM SERPL-SCNC: 4.4 MMOL/L (ref 3.6–5.5)
PROT SERPL-MCNC: 6.2 G/DL (ref 6–8.2)
PROTHROMBIN TIME: 12.9 SEC (ref 12–14.6)
RBC # BLD AUTO: 3.3 M/UL (ref 4.7–6.1)
SODIUM SERPL-SCNC: 139 MMOL/L (ref 135–145)
TIBC SERPL-MCNC: 273 UG/DL (ref 250–450)
UIBC SERPL-MCNC: 231 UG/DL (ref 110–370)
URATE SERPL-MCNC: 5 MG/DL (ref 2.5–8.3)
WBC # BLD AUTO: 5.9 K/UL (ref 4.8–10.8)

## 2021-08-25 PROCEDURE — 36415 COLL VENOUS BLD VENIPUNCTURE: CPT | Mod: GA

## 2021-08-25 PROCEDURE — 85730 THROMBOPLASTIN TIME PARTIAL: CPT | Mod: GA

## 2021-08-25 PROCEDURE — 82728 ASSAY OF FERRITIN: CPT

## 2021-08-25 PROCEDURE — 62328 DX LMBR SPI PNXR W/FLUOR/CT: CPT

## 2021-08-25 PROCEDURE — 85025 COMPLETE CBC W/AUTO DIFF WBC: CPT

## 2021-08-25 PROCEDURE — 85610 PROTHROMBIN TIME: CPT

## 2021-08-25 PROCEDURE — 62270 DX LMBR SPI PNXR: CPT

## 2021-08-25 PROCEDURE — 88108 CYTOPATH CONCENTRATE TECH: CPT

## 2021-08-25 PROCEDURE — 83550 IRON BINDING TEST: CPT

## 2021-08-25 PROCEDURE — 306637 DX-LUMBAR PUNCTURE FOR DIAGNOSIS

## 2021-08-25 PROCEDURE — 80053 COMPREHEN METABOLIC PANEL: CPT

## 2021-08-25 PROCEDURE — 83540 ASSAY OF IRON: CPT

## 2021-08-25 PROCEDURE — 83615 LACTATE (LD) (LDH) ENZYME: CPT

## 2021-08-25 PROCEDURE — 84550 ASSAY OF BLOOD/URIC ACID: CPT

## 2023-03-27 ENCOUNTER — HOSPITAL ENCOUNTER (OUTPATIENT)
Dept: RADIOLOGY | Facility: MEDICAL CENTER | Age: 73
End: 2023-03-27
Attending: INTERNAL MEDICINE
Payer: MEDICARE

## 2023-09-07 ENCOUNTER — HOSPITAL ENCOUNTER (OUTPATIENT)
Dept: RADIOLOGY | Facility: MEDICAL CENTER | Age: 73
End: 2023-09-07
Attending: INTERNAL MEDICINE
Payer: MEDICARE

## (undated) DEVICE — SODIUM CHL. INJ. 0.9% 500ML (24EA/CA 50CA/PF)

## (undated) DEVICE — TOWEL STOP TIMEOUT SAFETY FLAG (40EA/CA)

## (undated) DEVICE — COVER FOOT UNIVERSAL DISP. - (25EA/CA)

## (undated) DEVICE — SET EXTENSION WITH 2 PORTS (48EA/CA) ***PART #2C8610 IS A SUBSTITUTE*****

## (undated) DEVICE — JELLY SURGILUBE STERILE TUBE 4.25 OZ (1/EA)

## (undated) DEVICE — BANDAID SHEER STRIP 3/4 IN (100EA/BX 12BX/CA)

## (undated) DEVICE — TOWELS CLOTH SURGICAL - (4/PK 20PK/CA)

## (undated) DEVICE — MEDICINE CUP STERILE 2 OZ - (100/CA)

## (undated) DEVICE — ELECTRODE 850 FOAM ADHESIVE - HYDROGEL RADIOTRNSPRNT (50/PK)

## (undated) DEVICE — PROTECTOR ULNA NERVE - (36PR/CA)

## (undated) DEVICE — SET IRRIGATION CYSTOSCOPY Y-TYPE L81 IN (20EA/CA)

## (undated) DEVICE — KIT ANESTHESIA W/CIRCUIT & 3/LT BAG W/FILTER (20EA/CA)

## (undated) DEVICE — CONNECTOR HOSE NEPTUNE FOR CYSTO ROOM

## (undated) DEVICE — GLOVE BIOGEL SZ 8 SURGICAL PF LTX - (50PR/BX 4BX/CA)

## (undated) DEVICE — SYRINGE DISP. 12 CC LL - (100/BX)

## (undated) DEVICE — HEAD HOLDER JUNIOR/ADULT

## (undated) DEVICE — WATER IRRIG. STER 3000 ML - (4/CA)

## (undated) DEVICE — SYRINGE 3 CC 22 GA X 1-1/2 - NDL SAFETY (50/BX 8BX/CA)

## (undated) DEVICE — GLOVE BIOGEL PI INDICATOR SZ 8.0 SURGICAL PF LF -(50/BX 4BX/CA)

## (undated) DEVICE — TUBING CLEARLINK DUO-VENT - C-FLO (48EA/CA)

## (undated) DEVICE — SYRINGE 6 CC 20 GA X 1 1/2 - NDL SAFETY  (50/BX)

## (undated) DEVICE — GLOVE BIOGEL SZ 7.5 SURGICAL PF LTX - (50PR/BX 4BX/CA)

## (undated) DEVICE — SLEEVE, VASO, THIGH, MED

## (undated) DEVICE — SPONGE GAUZESTER 4 X 4 4PLY - (128PK/CA)

## (undated) DEVICE — WIRE GUIDE SENSOR DUAL FLEX - 5/BX

## (undated) DEVICE — SET LEADWIRE 5 LEAD BEDSIDE DISPOSABLE ECG (1SET OF 5/EA)

## (undated) DEVICE — SUCTION INSTRUMENT YANKAUER BULBOUS TIP W/O VENT (50EA/CA)

## (undated) DEVICE — GLOVE SZ 7.5 BIOGEL PI MICRO - PF LF (50PR/BX)

## (undated) DEVICE — TUBE CONNECT SUCTION CLEAR 120 X 1/4" (50EA/CA)"

## (undated) DEVICE — GOWN WARMING STANDARD FLEX - (30/CA)

## (undated) DEVICE — PACK CYSTOSCOPY III - (8/CA)

## (undated) DEVICE — SENSOR SPO2 NEO LNCS ADHESIVE (20/BX) SEE USER NOTES

## (undated) DEVICE — MASK ANESTHESIA ADULT  - (100/CA)

## (undated) DEVICE — WATER IRRIGATION STERILE 1000ML (12EA/CA)

## (undated) DEVICE — NEPTUNE 4 PORT MANIFOLD - (20/PK)

## (undated) DEVICE — LACTATED RINGERS INJ 1000 ML - (14EA/CA 60CA/PF)

## (undated) DEVICE — CANNULA O2 COMFORT SOFT EAR ADULT 7 FT TUBING (50/CA)

## (undated) DEVICE — BAG URODRAIN WITH TUBING - (20/CA)

## (undated) DEVICE — CATHETER URETHRAL OPEN END AXXCESS (10EA/BX)

## (undated) DEVICE — SOD. CHL 10CC SYRINGE PREFILL - W/10 CC (30/BX)